# Patient Record
Sex: MALE | Race: WHITE | Employment: FULL TIME | URBAN - METROPOLITAN AREA
[De-identification: names, ages, dates, MRNs, and addresses within clinical notes are randomized per-mention and may not be internally consistent; named-entity substitution may affect disease eponyms.]

---

## 2017-10-27 LAB
CREATININE, EXTERNAL: 1.2
LDL-C, EXTERNAL: 49

## 2018-05-18 ENCOUNTER — APPOINTMENT (OUTPATIENT)
Dept: MRI IMAGING | Age: 63
DRG: 520 | End: 2018-05-18
Attending: EMERGENCY MEDICINE
Payer: COMMERCIAL

## 2018-05-18 ENCOUNTER — APPOINTMENT (OUTPATIENT)
Dept: GENERAL RADIOLOGY | Age: 63
DRG: 520 | End: 2018-05-18
Attending: PHYSICIAN ASSISTANT
Payer: COMMERCIAL

## 2018-05-18 ENCOUNTER — HOSPITAL ENCOUNTER (INPATIENT)
Age: 63
LOS: 1 days | Discharge: HOME | DRG: 520 | End: 2018-05-22
Attending: EMERGENCY MEDICINE | Admitting: ORTHOPAEDIC SURGERY
Payer: COMMERCIAL

## 2018-05-18 DIAGNOSIS — M51.26 LUMBAR DISC HERNIATION: Primary | ICD-10-CM

## 2018-05-18 PROBLEM — M54.9 INTRACTABLE BACK PAIN: Status: ACTIVE | Noted: 2018-05-18

## 2018-05-18 PROBLEM — M54.59 INTRACTABLE LOW BACK PAIN: Status: ACTIVE | Noted: 2018-05-18

## 2018-05-18 LAB
ALBUMIN SERPL-MCNC: 4.2 G/DL (ref 3.5–5)
ALBUMIN/GLOB SERPL: 1.6 {RATIO} (ref 1.1–2.2)
ALP SERPL-CCNC: 46 U/L (ref 45–117)
ALT SERPL-CCNC: 27 U/L (ref 12–78)
ANION GAP SERPL CALC-SCNC: 3 MMOL/L (ref 5–15)
AST SERPL-CCNC: 18 U/L (ref 15–37)
ATRIAL RATE: 67 BPM
BASOPHILS # BLD: 0.1 K/UL (ref 0–0.1)
BASOPHILS NFR BLD: 1 % (ref 0–1)
BILIRUB SERPL-MCNC: 0.7 MG/DL (ref 0.2–1)
BUN SERPL-MCNC: 27 MG/DL (ref 6–20)
BUN/CREAT SERPL: 28 (ref 12–20)
CALCIUM SERPL-MCNC: 8.7 MG/DL (ref 8.5–10.1)
CALCULATED P AXIS, ECG09: 78 DEGREES
CALCULATED R AXIS, ECG10: -6 DEGREES
CALCULATED T AXIS, ECG11: 66 DEGREES
CHLORIDE SERPL-SCNC: 106 MMOL/L (ref 97–108)
CO2 SERPL-SCNC: 30 MMOL/L (ref 21–32)
CREAT SERPL-MCNC: 0.98 MG/DL (ref 0.7–1.3)
DIAGNOSIS, 93000: NORMAL
DIFFERENTIAL METHOD BLD: NORMAL
EOSINOPHIL # BLD: 0 K/UL (ref 0–0.4)
EOSINOPHIL NFR BLD: 1 % (ref 0–7)
ERYTHROCYTE [DISTWIDTH] IN BLOOD BY AUTOMATED COUNT: 12.6 % (ref 11.5–14.5)
GLOBULIN SER CALC-MCNC: 2.7 G/DL (ref 2–4)
GLUCOSE SERPL-MCNC: 104 MG/DL (ref 65–100)
HCT VFR BLD AUTO: 42.7 % (ref 36.6–50.3)
HGB BLD-MCNC: 14.4 G/DL (ref 12.1–17)
IMM GRANULOCYTES # BLD: 0 K/UL (ref 0–0.04)
IMM GRANULOCYTES NFR BLD AUTO: 0 % (ref 0–0.5)
LYMPHOCYTES # BLD: 1.7 K/UL (ref 0.8–3.5)
LYMPHOCYTES NFR BLD: 25 % (ref 12–49)
MCH RBC QN AUTO: 31 PG (ref 26–34)
MCHC RBC AUTO-ENTMCNC: 33.7 G/DL (ref 30–36.5)
MCV RBC AUTO: 91.8 FL (ref 80–99)
MONOCYTES # BLD: 0.7 K/UL (ref 0–1)
MONOCYTES NFR BLD: 10 % (ref 5–13)
NEUTS SEG # BLD: 4.4 K/UL (ref 1.8–8)
NEUTS SEG NFR BLD: 64 % (ref 32–75)
NRBC # BLD: 0 K/UL (ref 0–0.01)
NRBC BLD-RTO: 0 PER 100 WBC
P-R INTERVAL, ECG05: 156 MS
PLATELET # BLD AUTO: 247 K/UL (ref 150–400)
PMV BLD AUTO: 10 FL (ref 8.9–12.9)
POTASSIUM SERPL-SCNC: 3.9 MMOL/L (ref 3.5–5.1)
PROT SERPL-MCNC: 6.9 G/DL (ref 6.4–8.2)
Q-T INTERVAL, ECG07: 430 MS
QRS DURATION, ECG06: 104 MS
QTC CALCULATION (BEZET), ECG08: 454 MS
RBC # BLD AUTO: 4.65 M/UL (ref 4.1–5.7)
SODIUM SERPL-SCNC: 139 MMOL/L (ref 136–145)
TROPONIN I SERPL-MCNC: <0.04 NG/ML
VENTRICULAR RATE, ECG03: 67 BPM
WBC # BLD AUTO: 6.9 K/UL (ref 4.1–11.1)

## 2018-05-18 PROCEDURE — 72148 MRI LUMBAR SPINE W/O DYE: CPT

## 2018-05-18 PROCEDURE — 84484 ASSAY OF TROPONIN QUANT: CPT | Performed by: EMERGENCY MEDICINE

## 2018-05-18 PROCEDURE — 85025 COMPLETE CBC W/AUTO DIFF WBC: CPT | Performed by: EMERGENCY MEDICINE

## 2018-05-18 PROCEDURE — 36415 COLL VENOUS BLD VENIPUNCTURE: CPT | Performed by: EMERGENCY MEDICINE

## 2018-05-18 PROCEDURE — 80053 COMPREHEN METABOLIC PANEL: CPT | Performed by: EMERGENCY MEDICINE

## 2018-05-18 PROCEDURE — 96374 THER/PROPH/DIAG INJ IV PUSH: CPT

## 2018-05-18 PROCEDURE — 96376 TX/PRO/DX INJ SAME DRUG ADON: CPT

## 2018-05-18 PROCEDURE — 72100 X-RAY EXAM L-S SPINE 2/3 VWS: CPT

## 2018-05-18 PROCEDURE — 72110 X-RAY EXAM L-2 SPINE 4/>VWS: CPT

## 2018-05-18 PROCEDURE — 74011250636 HC RX REV CODE- 250/636: Performed by: EMERGENCY MEDICINE

## 2018-05-18 PROCEDURE — 77030032490 HC SLV COMPR SCD KNE COVD -B

## 2018-05-18 PROCEDURE — 99285 EMERGENCY DEPT VISIT HI MDM: CPT

## 2018-05-18 PROCEDURE — 99218 HC RM OBSERVATION: CPT

## 2018-05-18 PROCEDURE — 74011250637 HC RX REV CODE- 250/637: Performed by: PHYSICIAN ASSISTANT

## 2018-05-18 PROCEDURE — 74011250637 HC RX REV CODE- 250/637: Performed by: EMERGENCY MEDICINE

## 2018-05-18 PROCEDURE — 96375 TX/PRO/DX INJ NEW DRUG ADDON: CPT

## 2018-05-18 PROCEDURE — 93005 ELECTROCARDIOGRAM TRACING: CPT

## 2018-05-18 PROCEDURE — 74011250636 HC RX REV CODE- 250/636: Performed by: PHYSICIAN ASSISTANT

## 2018-05-18 RX ORDER — HYDROMORPHONE HYDROCHLORIDE 2 MG/ML
1 INJECTION, SOLUTION INTRAMUSCULAR; INTRAVENOUS; SUBCUTANEOUS
Status: DISCONTINUED | OUTPATIENT
Start: 2018-05-18 | End: 2018-05-22 | Stop reason: HOSPADM

## 2018-05-18 RX ORDER — DOCUSATE SODIUM 100 MG/1
100 CAPSULE, LIQUID FILLED ORAL 2 TIMES DAILY
Status: DISCONTINUED | OUTPATIENT
Start: 2018-05-18 | End: 2018-05-21 | Stop reason: SDUPTHER

## 2018-05-18 RX ORDER — IBUPROFEN 200 MG
400 TABLET ORAL
Status: ON HOLD | COMMUNITY
End: 2018-05-22

## 2018-05-18 RX ORDER — CLOPIDOGREL BISULFATE 75 MG/1
75 TABLET ORAL DAILY
Status: ON HOLD | COMMUNITY
End: 2018-05-22

## 2018-05-18 RX ORDER — MORPHINE SULFATE 4 MG/ML
4 INJECTION INTRAVENOUS
Status: COMPLETED | OUTPATIENT
Start: 2018-05-18 | End: 2018-05-18

## 2018-05-18 RX ORDER — ROSUVASTATIN CALCIUM 40 MG/1
40 TABLET, COATED ORAL DAILY
COMMUNITY

## 2018-05-18 RX ORDER — OXYCODONE HYDROCHLORIDE 5 MG/1
5-10 TABLET ORAL
Status: DISCONTINUED | OUTPATIENT
Start: 2018-05-18 | End: 2018-05-22 | Stop reason: HOSPADM

## 2018-05-18 RX ORDER — SODIUM CHLORIDE 0.9 % (FLUSH) 0.9 %
5-10 SYRINGE (ML) INJECTION EVERY 8 HOURS
Status: DISCONTINUED | OUTPATIENT
Start: 2018-05-18 | End: 2018-05-22 | Stop reason: HOSPADM

## 2018-05-18 RX ORDER — METHYLPREDNISOLONE 4 MG/1
4-20 TABLET ORAL
COMMUNITY
End: 2018-05-22

## 2018-05-18 RX ORDER — LORAZEPAM 2 MG/ML
2 INJECTION INTRAMUSCULAR
Status: DISCONTINUED | OUTPATIENT
Start: 2018-05-18 | End: 2018-05-22 | Stop reason: HOSPADM

## 2018-05-18 RX ORDER — SODIUM CHLORIDE 9 MG/ML
100 INJECTION, SOLUTION INTRAVENOUS CONTINUOUS
Status: DISCONTINUED | OUTPATIENT
Start: 2018-05-18 | End: 2018-05-22 | Stop reason: HOSPADM

## 2018-05-18 RX ORDER — DIAZEPAM 10 MG/2ML
5 INJECTION INTRAMUSCULAR
Status: DISCONTINUED | OUTPATIENT
Start: 2018-05-18 | End: 2018-05-18

## 2018-05-18 RX ORDER — EZETIMIBE 10 MG/1
10 TABLET ORAL DAILY
Status: DISCONTINUED | OUTPATIENT
Start: 2018-05-19 | End: 2018-05-22 | Stop reason: HOSPADM

## 2018-05-18 RX ORDER — ACETAMINOPHEN 325 MG/1
650 TABLET ORAL
Status: DISCONTINUED | OUTPATIENT
Start: 2018-05-18 | End: 2018-05-22 | Stop reason: HOSPADM

## 2018-05-18 RX ORDER — ATORVASTATIN CALCIUM 40 MG/1
80 TABLET, FILM COATED ORAL DAILY
Status: DISCONTINUED | OUTPATIENT
Start: 2018-05-19 | End: 2018-05-22 | Stop reason: HOSPADM

## 2018-05-18 RX ORDER — DIPHENHYDRAMINE HYDROCHLORIDE 50 MG/ML
12.5 INJECTION, SOLUTION INTRAMUSCULAR; INTRAVENOUS
Status: DISCONTINUED | OUTPATIENT
Start: 2018-05-18 | End: 2018-05-22 | Stop reason: HOSPADM

## 2018-05-18 RX ORDER — BISMUTH SUBSALICYLATE 262 MG
1 TABLET,CHEWABLE ORAL DAILY
COMMUNITY

## 2018-05-18 RX ORDER — SODIUM CHLORIDE 0.9 % (FLUSH) 0.9 %
5-10 SYRINGE (ML) INJECTION AS NEEDED
Status: DISCONTINUED | OUTPATIENT
Start: 2018-05-18 | End: 2018-05-22 | Stop reason: HOSPADM

## 2018-05-18 RX ORDER — EZETIMIBE 10 MG/1
10 TABLET ORAL DAILY
COMMUNITY
End: 2021-09-01 | Stop reason: ALTCHOICE

## 2018-05-18 RX ORDER — MORPHINE SULFATE 10 MG/ML
6 INJECTION, SOLUTION INTRAMUSCULAR; INTRAVENOUS
Status: COMPLETED | OUTPATIENT
Start: 2018-05-18 | End: 2018-05-18

## 2018-05-18 RX ORDER — ONDANSETRON 2 MG/ML
4 INJECTION INTRAMUSCULAR; INTRAVENOUS
Status: DISCONTINUED | OUTPATIENT
Start: 2018-05-18 | End: 2018-05-22 | Stop reason: HOSPADM

## 2018-05-18 RX ORDER — DEXAMETHASONE SODIUM PHOSPHATE 4 MG/ML
10 INJECTION, SOLUTION INTRA-ARTICULAR; INTRALESIONAL; INTRAMUSCULAR; INTRAVENOUS; SOFT TISSUE
Status: COMPLETED | OUTPATIENT
Start: 2018-05-18 | End: 2018-05-18

## 2018-05-18 RX ORDER — GUAIFENESIN 100 MG/5ML
81 LIQUID (ML) ORAL DAILY
Status: ON HOLD | COMMUNITY
End: 2018-05-22

## 2018-05-18 RX ORDER — HYDROMORPHONE HYDROCHLORIDE 2 MG/1
1 TABLET ORAL
Status: COMPLETED | OUTPATIENT
Start: 2018-05-18 | End: 2018-05-18

## 2018-05-18 RX ORDER — ONDANSETRON 2 MG/ML
4 INJECTION INTRAMUSCULAR; INTRAVENOUS
Status: COMPLETED | OUTPATIENT
Start: 2018-05-18 | End: 2018-05-18

## 2018-05-18 RX ORDER — NALOXONE HYDROCHLORIDE 0.4 MG/ML
0.4 INJECTION, SOLUTION INTRAMUSCULAR; INTRAVENOUS; SUBCUTANEOUS AS NEEDED
Status: DISCONTINUED | OUTPATIENT
Start: 2018-05-18 | End: 2018-05-21 | Stop reason: SDUPTHER

## 2018-05-18 RX ADMIN — Medication 10 ML: at 16:13

## 2018-05-18 RX ADMIN — Medication 10 ML: at 22:00

## 2018-05-18 RX ADMIN — LORAZEPAM 1 MG: 2 INJECTION INTRAMUSCULAR; INTRAVENOUS at 17:10

## 2018-05-18 RX ADMIN — SODIUM CHLORIDE 100 ML/HR: 900 INJECTION, SOLUTION INTRAVENOUS at 16:10

## 2018-05-18 RX ADMIN — MORPHINE SULFATE 4 MG: 4 INJECTION INTRAVENOUS at 15:47

## 2018-05-18 RX ADMIN — ONDANSETRON 4 MG: 2 INJECTION INTRAMUSCULAR; INTRAVENOUS at 10:08

## 2018-05-18 RX ADMIN — MORPHINE SULFATE 6 MG: 10 INJECTION INTRAVENOUS at 10:08

## 2018-05-18 RX ADMIN — DOCUSATE SODIUM 100 MG: 100 CAPSULE, LIQUID FILLED ORAL at 19:28

## 2018-05-18 RX ADMIN — HYDROMORPHONE HYDROCHLORIDE 1 MG: 2 TABLET ORAL at 13:51

## 2018-05-18 RX ADMIN — DEXAMETHASONE SODIUM PHOSPHATE 10 MG: 4 INJECTION, SOLUTION INTRAMUSCULAR; INTRAVENOUS at 13:51

## 2018-05-18 RX ADMIN — HYDROMORPHONE HYDROCHLORIDE 1 MG: 2 INJECTION INTRAMUSCULAR; INTRAVENOUS; SUBCUTANEOUS at 20:30

## 2018-05-18 NOTE — ED NOTES
Care assumed after report from Carlos London, [facility]. Pt resting quietly with wife at bedside. Pt awaiting MRI. No c/o pain at this time, stating morphine was effective. Will continue to monitor.

## 2018-05-18 NOTE — PROGRESS NOTES
Problem: Falls - Risk of  Goal: *Absence of Falls  Document Latrell Fall Risk and appropriate interventions in the flowsheet.    Outcome: Progressing Towards Goal  Fall Risk Interventions:  Mobility Interventions: Patient to call before getting OOB, Utilize walker, cane, or other assitive device         Medication Interventions: Patient to call before getting OOB, Teach patient to arise slowly    Elimination Interventions: Call light in reach

## 2018-05-18 NOTE — ED NOTES
TRANSFER - OUT REPORT:    Verbal report given to RONA Nettles for [employer]) on [facility]  being transferred to [facility](unit) for routine progression of care       Report consisted of patients Situation, Background, Assessment and   Recommendations(SBAR). Information from the following report(s) SBAR was reviewed with the receiving nurse. Lines:   Peripheral IV 05/18/18 Left Antecubital (Active)   Site Assessment Clean, dry, & intact 5/18/2018  9:47 AM   Phlebitis Assessment 0 5/18/2018  9:47 AM   Infiltration Assessment 0 5/18/2018  9:47 AM   Dressing Status Clean, dry, & intact 5/18/2018  9:47 AM   Hub Color/Line Status Pink;Capped;Flushed 5/18/2018  9:47 AM        Opportunity for questions and clarification was provided.       Patient transported with:   Miproto

## 2018-05-18 NOTE — IP AVS SNAPSHOT
[address] [facility] 
[phone] Patient: Geoff Cordova MRN: [id number] LVI:[id number] About your hospitalization You were admitted on:  May 18, 2018 You last received care in the:  [facility] ORTHOPEDICS You were discharged on:  May 22, 2018 Why you were hospitalized Your primary diagnosis was:  Lumbar Disc Herniation Your diagnoses also included:  Intractable Back Pain, Intractable Low Back Pain, Herniated Nucleus Pulposus Follow-up Information Follow up With Details Comments Contact Info Renetta Li MD Schedule an appointment as soon as possible for a visit in 2 weeks  [address] 
[phone] Derek Mena MD   [facility] 
[phone] Discharge Orders None A check ollie indicates which time of day the medication should be taken. My Medications START taking these medications Instructions Each Dose to Equal  
 Morning Noon Evening Bedtime  
 acetaminophen 325 mg tablet Commonly known as:  TYLENOL Your last dose was: Your next dose is: Take 2 Tabs by mouth every six (6) hours for 14 days. 650 mg  
    
   
   
   
  
 oxyCODONE IR 5 mg immediate release tablet Commonly known as:  Tom Kays Your last dose was: Your next dose is: Take 1-2 Tabs by mouth every four (4) hours as needed. Max Daily Amount: 60 mg.  
 5-10 mg  
    
   
   
   
  
 polyethylene glycol 17 gram/dose powder Commonly known as:  Clay Goodwin Your last dose was: Your next dose is: Take 17 g by mouth daily for 15 days. 17 g  
    
   
   
   
  
 senna-docusate 8.6-50 mg per tablet Commonly known as:  SENNA PLUS Your last dose was: Your next dose is: Take 1 Tab by mouth two (2) times a day. 1 Tab CHANGE how you take these medications Instructions Each Dose to Equal  
 Morning Noon Evening Bedtime  
 aspirin 81 mg chewable tablet What changed:  additional instructions Your last dose was: Your next dose is: Take 1 Tab by mouth daily. May resume in two days. 81 mg FISH -160-1,000 mg Cap Generic drug:  omega 3-dha-epa-fish oil What changed:  additional instructions Your last dose was: Your next dose is: Take 1,000 mg by mouth daily. Hold for two weeks. 1000 mg  
    
   
   
   
  
 ibuprofen 200 mg tablet Commonly known as:  MOTRIN What changed:  additional instructions Your last dose was: Your next dose is: Take 2 Tabs by mouth every eight (8) hours as needed for Pain. Hold for two weeks. 400 mg PLAVIX 75 mg Tab Generic drug:  clopidogrel What changed:  additional instructions Your last dose was: Your next dose is: Take 1 Tab by mouth daily. Dr. Eduar Chang asks that you resume in 4-5 days but noted you may use your professional judgement. 75 mg CONTINUE taking these medications Instructions Each Dose to Equal  
 Morning Noon Evening Bedtime CRESTOR 40 mg tablet Generic drug:  rosuvastatin Your last dose was: Your next dose is: Take 40 mg by mouth daily. 40 mg  
    
   
   
   
  
 multivitamin tablet Commonly known as:  ONE A DAY Your last dose was: Your next dose is: Take 1 Tab by mouth daily. 1 Tab  
    
   
   
   
  
 REPATHA SYRINGE syringe Generic drug:  evolocumab Your last dose was: Your next dose is:    
   
   
 140 mg by SubCUTAneous route every fourteen (14) days. 140 mg  
    
   
   
   
  
 ZETIA 10 mg tablet Generic drug:  ezetimibe Your last dose was: Your next dose is: Take 10 mg by mouth daily. 10 mg  
    
   
   
   
  
  
STOP taking these medications FISH  mg Cap Generic drug:  Omega-3 Fatty Acids  
   
  
 methylPREDNISolone 4 mg Tab Commonly known as:  MEDROL Where to Get Your Medications Information on where to get these meds will be given to you by the nurse or doctor. ! Ask your nurse or doctor about these medications  
  acetaminophen 325 mg tablet  
 oxyCODONE IR 5 mg immediate release tablet  
 polyethylene glycol 17 gram/dose powder  
 senna-docusate 8.6-50 mg per tablet Opioid Education Prescription Opioids: What You Need to Know: 
 
Prescription opioids can be used to help relieve moderate-to-severe pain and are often prescribed following a surgery or injury, or for certain health conditions. These medications can be an important part of treatment but also come with serious risks. Opioids are strong pain medicines. Examples include hydrocodone, oxycodone, fentanyl, and morphine. Heroin is an example of an illegal opioid. It is important to work with your health care provider to make sure you are getting the safest, most effective care. WHAT ARE THE RISKS AND SIDE EFFECTS OF OPIOID USE? Prescription opioids carry serious risks of addiction and overdose, especially with prolonged use. An opioid overdose, often marked by slow breathing, can cause sudden death. The use of prescription opioids can have a number of side effects as well, even when taken as directed. · Tolerance-meaning you might need to take more of a medication for the same pain relief · Physical dependence-meaning you have symptoms of withdrawal when the medication is stopped. Withdrawal symptoms can include nausea, sweating, chills, diarrhea, stomach cramps, and muscle aches. Withdrawal can last up to several weeks, depending on which drug you took and how long you took it. · Increased sensitivity to pain · Constipation · Nausea, vomiting, and dry mouth · Sleepiness and dizziness · Confusion · Depression · Low levels of testosterone that can result in lower sex drive, energy, and strength · Itching and sweating RISKS ARE GREATER WITH:      
· History of drug misuse, substance use disorder, or overdose · Mental health conditions (such as depression or anxiety) · Sleep apnea · Older age (72 years or older) · Pregnancy Avoid alcohol while taking prescription opioids. Also, unless specifically advised by your health care provider, medications to avoid include: · Benzodiazepines (such as Xanax or Valium) · Muscle relaxants (such as Soma or Flexeril) · Hypnotics (such as Ambien or Lunesta) · Other prescription opioids KNOW YOUR OPTIONS Talk to your health care provider about ways to manage your pain that don't involve prescription opioids. Some of these options may actually work better and have fewer risks and side effects. Options may include: 
· Pain relievers such as acetaminophen, ibuprofen, and naproxen · Some medications that are also used for depression or seizures · Physical therapy and exercise · Counseling to help patients learn how to cope better with triggers of pain and stress. · Application of heat or cold compress · Massage therapy · Relaxation techniques Be Informed Make sure you know the name of your medication, how much and how often to take it, and its potential risks & side effects. IF YOU ARE PRESCRIBED OPIOIDS FOR PAIN: 
· Never take opioids in greater amounts or more often than prescribed. Remember the goal is not to be pain-free but to manage your pain at a tolerable level. · Follow up with your primary care provider to: · Work together to create a plan on how to manage your pain. · Talk about ways to help manage your pain that don't involve prescription opioids. · Talk about any and all concerns and side effects. · Help prevent misuse and abuse. · Never sell or share prescription opioids · Help prevent misuse and abuse. · Store prescription opioids in a secure place and out of reach of others (this may include visitors, children, friends, and family). · Safely dispose of unused/unwanted prescription opioids: Find your community drug take-back program or your pharmacy mail-back program, or flush them down the toilet, following guidance from the Food and Drug Administration (www.fda.gov/Drugs/ResourcesForYou). · Visit www.cdc.gov/drugoverdose to learn about the risks of opioid abuse and overdose. · If you believe you may be struggling with addiction, tell your health care provider and ask for guidance or call [address] [place] [address] at 3-060-702-OJQF. Discharge Instructions SPINE DISCHARGE  INSTRUCTIONS Brad Denis M.D. What can I do? ? The only exercise you should do is walking. Start by walking twice a day for 5 minutes, then increase by  2-3 minutes every day until you reach 25 minutes twice a day. DO NOT sit for long periods of time (20 minutes at a time for the first couple of weeks, then gradually increase. ? No heavy lifting (5 lbs max); no straining, twisting, or bending. ? No driving until your physician tells you it is ok. It is, however, ok to ride short distances in a car. 
? If you are required to wear a back brace, you may remove it when you are sleeping unless your doctor has advised against it. ? If you are required to wear a cervical collar, you must sleep in it. You can remove it only for showers. What can I eat?  
? You may resume your regular home diet as tolerated. If your throat is sore, you may want to eat soft food for the first few days. When can I take a shower? ?  You may shower leaving on your occlusive (waterproof) dressing on allowing water to run over the dressing. The dressing may be removed in 5 days. The small pieces of tape (steri strips)  underneath it should stay on. Let water run over them, then pat dry gently. ? Do not take baths or soak in pools. ? You may remove your brace for showering. Medications: 
? Check with your physician before taking any anti-inflammatory medicines (Advil, Aleve, ibuprofen, aspirin). ? Take prescription medicine as directed. DO NOT take more than prescribed. Call your physician if the prescribed dose is not sufficiently controlling your pain. ? It is important to have regular bowel movements. Pain medications may cause constipation. Drink plenty of fluids, get enough fiber in your diet, and use stool softeners and drink prune juice to help prevent constipation. Do not take laxatives if at all possible except in severe situations. It can result in a vicious cycle of constipation and diarrhea. ? Do not put any ointments or creams on your incision unless directed to do so by your physician. ? Tobacco products should be avoided during the postoperative phase. When do I see the physician again? ? Please call your physicians office as soon as you get home to schedule your 1st post operative appointment. You should be seen approximately two weeks after your surgery. At this appointment you will see your doctors Assistant for a wound check and to answer any questions you may have. 
? You will see your physician approximately six weeks after your surgery. ? If you had a fusion, you will need to get an order for xrays to be taken prior to the six week appointment. These should be done on the day of the appointment or 1-2 days before. ? If you need the number to your doctors office, please request it from your nurse or see below. NOTIFY YOUR PHYSICIAN OF ANY OF THE FOLLOWING: 
 
? Fever above 101º for 24 hrs. 
? Nausea or vomiting. 
? Inability to urinate ? Loss of bowel or bladder function (sudden onset of incontinence) ? Changes in sensation (numbness, tingling, color change) in your extremities ? Pain not relieved by your medicine. ? Redness, swelling or drainage from your incision ? Persistent pain in the calf of either leg ? Development of severe pain FOR APPOINTMENTS OR QUESTIONS CALL: 
 
Neurosurgical JACLYN Cook [address] [facility], [address] [place] [address] 
[phone] [facility] Announcement We are excited to announce that we are making your provider's discharge notes available to you in [facility]. You will see these notes when they are completed and signed by the physician that discharged you from your recent hospital stay. If you have any questions or concerns about any information you see in [facility], please call the Health Information Department where you were seen or reach out to your Primary Care Provider for more information about your plan of care. Introducing [facility]! [facility] introduces [facility] patient portal. Now you can access parts of your medical record, email your doctor's office, and request medication refills online. 1. In your internet browser, go to [email or web address] 2. Click on the First Time User? Click Here link in the Sign In box. You will see the New Member Sign Up page. 3. Enter your [facility] Access Code exactly as it appears below. You will not need to use this code after youve completed the sign-up process. If you do not sign up before the expiration date, you must request a new code. · [facility] Access Code: [email or web address] Expires: 8/16/2018  9:18 AM 
 
4. Enter the last four digits of your Social Security Number (xxxx) and Date of Birth (mm/dd/yyyy) as indicated and click Submit. You will be taken to the next sign-up page. 5. Create a [facility] ID.  This will be your [facility] login ID and cannot be changed, so think of one that is secure and easy to remember. 6. Create a [employer] password. You can change your password at any time. 7. Enter your Password Reset Question and Answer. This can be used at a later time if you forget your password. 8. Enter your e-mail address. You will receive e-mail notification when new information is available in [address] 9. Click Sign Up. You can now view and download portions of your medical record. 10. Click the Download Summary menu link to download a portable copy of your medical information. If you have questions, please visit the Frequently Asked Questions section of the [employer] website. Remember, [employer] is NOT to be used for urgent needs. For medical emergencies, dial 911. Now available from your iPhone and Android! Introducing Carlos Eduardo Fulton As a Raul Fleming patient, I wanted to make you aware of our electronic visit tool called Carlos Eduardo Donaldo. Raul Fleming [employer] allows you to connect within minutes with a medical provider 24 hours a day, seven days a week via a mobile device or tablet or logging into a secure website from your computer. You can access Carlos Eduardo Fulton from anywhere in the [place]. A virtual visit might be right for you when you have a simple condition and feel like you just dont want to get out of bed, or cant get away from work for an appointment, when your [facility] is not available (evenings, weekends or holidays), or when youre out of town and need minor care. Electronic visits cost only $49 and if the [facility] provider determines a prescription is needed to treat your condition, one can be electronically transmitted to a nearby pharmacy*. Please take a moment to enroll today if you have not already done so. The enrollment process is free and takes just a few minutes.   To enroll, please download the [employer] blake to your tablet or phone, or visit [email or web address] to enroll on your computer. And, as an [address] [place] [address] patient with a [employer] account, the results of your visits will be scanned into your electronic medical record and your primary care provider will be able to view the scanned results. We urge you to continue to see your regular Radha Reusing provider for your ongoing medical care. And while your primary care provider may not be the one available when you seek a [employer] virtual visit, the peace of mind you get from getting a real diagnosis real time can be priceless. For more information on [employer], view our Frequently Asked Questions (FAQs) at [email or web address]. Sincerely, 
 
Kanika Kauffman MD 
Chief Medical Officer [facility] Kathi Ronquillo *:  certain medications cannot be prescribed via [employer] Providers Seen During Your Hospitalization Provider Specialty Primary office phone Emeterio Garza MD Emergency Medicine [phone] Zackary Snellen, MD Orthopedic Surgery [phone] Padmini Parks MD Neurosurgery [phone] Your Primary Care Physician (PCP) Primary Care Physician Office Phone Office Fax Shara Mcardle [phone] You are allergic to the following No active allergies Recent Documentation Height Weight BMI Smoking Status 1.702 m 65.8 kg 22.71 kg/m2 Never Smoker Emergency Contacts Name Discharge Info Relation Home Work Mobile TrainerIvone DISCHARGE CAREGIVER [3] Spouse [3] [phone] Patient Belongings The following personal items are in your possession at time of discharge: 
  Dental Appliances: None  Visual Aid: Glasses   Hearing Aids/Status: Does not own  Home Medications: None   Jewelry: None  Clothing: None    Other Valuables: None  Personal Items Sent to Safe: No valuables brought to [facility] Please provide this summary of care documentation to your next provider. Signatures-by signing, you are acknowledging that this After Visit Summary has been reviewed with you and you have received a copy. Patient Signature:  ____________________________________________________________ Date:  ____________________________________________________________  
  
Rexann Ports Provider Signature:  ____________________________________________________________ Date:  ____________________________________________________________

## 2018-05-18 NOTE — ED PROVIDER NOTES
EMERGENCY DEPARTMENT HISTORY AND PHYSICAL EXAM      Date: 5/18/2018  Patient Name: Radha Ball    History of Presenting Illness     Chief Complaint   Patient presents with    Back Pain     pt was working out on Wednesday and hurt his back, pt seen by primary and placed on steroids       History Provided By: Patient    HPI: Radha Ball, 59 y.o. male with PMHx significant for CAD, HLD, presents via EMS to the ED with cc of constant, progressively worsening left lower back pain since onset 3 days ago. Pt reports that the pain radiates down the left lower extremity. Pt explains that his mild back pain began after a workout two days ago that has gotten progressively worse. Pt endorses an inability to stand or walk this morning secondary to exacerbation of pain, explaining that he felt weak. He reports feeling lightheaded and as if he was about to pass out but states he was able to sit down without any LOC. He states that he followed up with his PCP yesterday regarding the pain and had an unremarkable lumbar x-ray other than L4-L5 arthritis. He was started on a Medrol dose pack which he has been taking in addition to Advil 800 mg without relief. Additionally, he reports taking 1 tablet of Hydrocodone today. He reports a hx of CAD with prior stent placement and is currently on Plavix. Pt denies tingling numbness, saddle anesthesia, urinary/fecal incontinence,  CP, SOB, cough, abd pain, or N/V/D. There are no other complaints, changes, or physical findings at this time. PCP: Lucy Wilson MD      Past History     Past Medical History:  Past Medical History:   Diagnosis Date    CAD (coronary artery disease)     Hyperlipidemia        Past Surgical History:  Past Surgical History:   Procedure Laterality Date    HX ANGIOPLASTY      HX APPENDECTOMY      HX HERNIA REPAIR      HX TONSILLECTOMY      HX WISDOM TEETH EXTRACTION         Family History:  History reviewed.  No pertinent family history. Social History:  Social History   Substance Use Topics    Smoking status: Never Smoker    Smokeless tobacco: Never Used    Alcohol use Yes      Comment: social       Allergies:  No Known Allergies      Review of Systems   Review of Systems   Constitutional: Negative for chills, fatigue and fever. HENT: Negative for congestion, rhinorrhea and sore throat. Eyes: Negative for pain, discharge and visual disturbance. Respiratory: Negative for cough, chest tightness, shortness of breath and wheezing. Cardiovascular: Negative for chest pain, palpitations and leg swelling. Gastrointestinal: Negative for abdominal pain, constipation, diarrhea, nausea and vomiting. Genitourinary: Negative for dysuria, frequency and hematuria. Musculoskeletal: Positive for back pain. Negative for arthralgias and myalgias. Skin: Negative for rash. Neurological: Positive for weakness (generalized) and light-headedness. Negative for dizziness and headaches. Negative for urinary/fecal incontinence, lower extremity weakness, numbness/tingling, saddle anesthesia. Psychiatric/Behavioral: Negative. Physical Exam   Physical Exam   Constitutional: He is oriented to person, place, and time. He appears well-developed and well-nourished. Distressed: Appears uncomfortable. HENT:   Head: Normocephalic and atraumatic. Eyes: EOM are normal. Right eye exhibits no discharge. Left eye exhibits no discharge. No scleral icterus. Neck: Normal range of motion. Neck supple. No tracheal deviation present. Cardiovascular: Normal rate, regular rhythm, normal heart sounds and intact distal pulses. Exam reveals no gallop and no friction rub. No murmur heard. Pulmonary/Chest: Effort normal and breath sounds normal. No respiratory distress. He has no wheezes. He has no rales. Abdominal: Soft. He exhibits no distension. There is no tenderness. Musculoskeletal: Normal range of motion. He exhibits no edema. There is no midline T or L spine tenderness. Dorsiflexion intact in great toes bilaterally. Strength 5/5 in all extremities   Lymphadenopathy:     He has no cervical adenopathy. Neurological: He is alert and oriented to person, place, and time. 5/5 strength in all extremities. Skin: Skin is warm and dry. No rash noted. Psychiatric: He has a normal mood and affect. Nursing note and vitals reviewed. Diagnostic Study Results     Labs -  Recent Results (from the past 12 hour(s))   EKG, 12 LEAD, INITIAL    Collection Time: 05/18/18  9:34 AM   Result Value Ref Range    Ventricular Rate 67 BPM    Atrial Rate 67 BPM    P-R Interval 156 ms    QRS Duration 104 ms    Q-T Interval 430 ms    QTC Calculation (Bezet) 454 ms    Calculated P Axis 78 degrees    Calculated R Axis -6 degrees    Calculated T Axis 66 degrees    Diagnosis       Normal sinus rhythm  Possible Left atrial enlargement  RSR' or QR pattern in V1 suggests right ventricular conduction delay  Non-specific ST changes anteriorly  No previous ECGs available  Confirmed by Christ Us ([id number]) on 5/18/2018 11:57:49 AM     CBC WITH AUTOMATED DIFF    Collection Time: 05/18/18  9:51 AM   Result Value Ref Range    WBC 6.9 4.1 - 11.1 K/uL    RBC 4.65 4.10 - 5.70 M/uL    HGB 14.4 12.1 - 17.0 g/dL    HCT 42.7 36.6 - 50.3 %    MCV 91.8 80.0 - 99.0 FL    MCH 31.0 26.0 - 34.0 PG    MCHC 33.7 30.0 - 36.5 g/dL    RDW 12.6 11.5 - 14.5 %    PLATELET 524 448 - 362 K/uL    MPV 10.0 8.9 - 12.9 FL    NRBC 0.0 0  WBC    ABSOLUTE NRBC 0.00 0.00 - 0.01 K/uL    NEUTROPHILS 64 32 - 75 %    LYMPHOCYTES 25 12 - 49 %    MONOCYTES 10 5 - 13 %    EOSINOPHILS 1 0 - 7 %    BASOPHILS 1 0 - 1 %    IMMATURE GRANULOCYTES 0 0.0 - 0.5 %    ABS. NEUTROPHILS 4.4 1.8 - 8.0 K/UL    ABS. LYMPHOCYTES 1.7 0.8 - 3.5 K/UL    ABS. MONOCYTES 0.7 0.0 - 1.0 K/UL    ABS. EOSINOPHILS 0.0 0.0 - 0.4 K/UL    ABS. BASOPHILS 0.1 0.0 - 0.1 K/UL    ABS. IMM.  GRANS. 0.0 0.00 - 0.04 K/UL    DF AUTOMATED     METABOLIC PANEL, COMPREHENSIVE    Collection Time: 05/18/18  9:51 AM   Result Value Ref Range    Sodium 139 136 - 145 mmol/L    Potassium 3.9 3.5 - 5.1 mmol/L    Chloride 106 97 - 108 mmol/L    CO2 30 21 - 32 mmol/L    Anion gap 3 (L) 5 - 15 mmol/L    Glucose 104 (H) 65 - 100 mg/dL    BUN 27 (H) 6 - 20 MG/DL    Creatinine 0.98 0.70 - 1.30 MG/DL    BUN/Creatinine ratio 28 (H) 12 - 20      GFR est AA >60 >60 ml/min/1.73m2    GFR est non-AA >60 >60 ml/min/1.73m2    Calcium 8.7 8.5 - 10.1 MG/DL    Bilirubin, total 0.7 0.2 - 1.0 MG/DL    ALT (SGPT) 27 12 - 78 U/L    AST (SGOT) 18 15 - 37 U/L    Alk. phosphatase 46 45 - 117 U/L    Protein, total 6.9 6.4 - 8.2 g/dL    Albumin 4.2 3.5 - 5.0 g/dL    Globulin 2.7 2.0 - 4.0 g/dL    A-G Ratio 1.6 1.1 - 2.2     TROPONIN I    Collection Time: 05/18/18  9:51 AM   Result Value Ref Range    Troponin-I, Qt. <0.04 <0.05 ng/mL       Radiologic Studies -   MRI LUMB SPINE WO CONT   Final Result   EXAM:  MRI LUMB SPINE WO CONT     INDICATION:  Low back pain since injury 2 days ago. Now unable to urinate.     COMPARISON: None     TECHNIQUE: MR imaging of the lumbar spine was performed using the following  sequences: sagittal T1, T2, STIR;  axial T1, T2.      CONTRAST:  None.     FINDINGS:     5 mm grade 1 anterolisthesis of L4-5 is secondary to facet arthrosis. No  evidence of spondylolysis. Moderate facet arthrosis L4-S1. Vertebral body  heights are maintained. Degenerative bone marrow edema is in the right L4-5  facets. No marrow replacing process. Mild disc desiccation and vacuum disc  phenomenon at L4-5. No discitis.     The conus medullaris terminates at L1. Signal and caliber of the distal spinal  cord are within normal limits.      The paraspinal soft tissues are within normal limits.     Lower thoracic spine: No herniation or stenosis.     L1-L2:  No herniation or stenosis.     L2-L3:  Minimal diffuse disc bulge. No stenosis.     L3-L4:  Diffuse disc bulge.  No stenosis.     L4-L5:  Uncovered diffuse disc bulge. Superimposed left central disc extrusion  migrates routine millimeters cranially. Facet arthrosis. Mild central spinal  canal stenosis. Moderate left foraminal stenosis and likely mass effect on the  left L4 nerve. Mild right foraminal stenosis.     L5-S1:  Minimal diffuse disc bulge. Facet arthrosis. No stenosis.     IMPRESSION  IMPRESSION:     1. L4-5 left central disc extrusion likely affects the left L4 nerve. 2. L4-5 grade 1 anterolisthesis secondary to facet arthrosis. 3. No fracture. Medical Decision Making   I am the first provider for this patient. I reviewed the vital signs, available nursing notes, past medical history, past surgical history, family history and social history. Vital Signs-Reviewed the patient's vital signs. Patient Vitals for the past 12 hrs:   Temp Pulse Resp BP SpO2   05/18/18 1256 - - - 128/88 -   05/18/18 1030 - - - 120/82 97 %   05/18/18 1000 - - - 126/81 98 %   05/18/18 0930 - - - 136/88 98 %   05/18/18 0929 97.3 °F (36.3 °C) 72 14 (!) 125/108 97 %       EKG interpretation: (Preliminary) 0934  Rhythm: normal sinus rhythm; and regular . Rate (approx.): 67; Axis: normal; NV interval: normal; QRS interval: normal ; ST/T wave: Nonspecific ST/T wave abnormality. Written by EFRA Dumont, as dictated by Stacey Turner MD.    Records Reviewed: Nursing Notes    Provider Notes (Medical Decision Making):   DDx: disc herniation, DDD, cord compression, sciatica, sprain, strain, fracture, contusion    ED Course:   Initial assessment performed. The patients presenting problems have been discussed, and they are in agreement with the care plan formulated and outlined with them. I have encouraged them to ask questions as they arise throughout their visit.     CONSULT NOTE:  1:08 PM  Stacey Turner MD spoke with Gab Alvarado PA-C,  Specialty: Orthopedic Surgery  Discussed pt's hx, disposition, and available diagnostic and imaging results. Reviewed care plans. Consultant will review pt's imaging and case with his attending. PROGRESS NOTE:  1:16 PM  Gab Alvardao PA-C has spoken with Dr. Savannah Dasilva who recommends Decadron, Dilaudid, and Neurontin. PROGRESS NOTE:  1:54 PM  Pt to be admitted to orthopedics under Dr. Savannah Dasilva. Disposition:  2:03 PM  Patient is being admitted to the hospital.  The results of their tests and reasons for their admission have been discussed with them and/or available family. They convey agreement and understanding for the need to be admitted and for their admission diagnosis. Consultation has been made with the inpatient physician specialist for hospitalization. PLAN:  1. Admit to Orthopedics    Diagnosis     Clinical Impression:   1. Lumbar disc herniation        Attestations: This note is prepared by Maxine English, acting as Scribe for Stacey Turner MD.    Stacey Turner MD: The scribe's documentation has been prepared under my direction and personally reviewed by me in its entirety. I confirm that the note above accurately reflects all work, treatment, procedures, and medical decision making performed by me.

## 2018-05-18 NOTE — H&P
Subjective:     Patient ID: Rupa Pittman is a 59 y.o. male. Chief Complaint: Back Pain (pt was working out on Wednesday and hurt his back, pt seen by primary and placed on steroids)      HPI:  Rupa Pittman is a 59 y.o. male with complaints of LBP and LLE. The pain is sharp, achy in quality. It has been present for 2 days and is there constantly. It is rated 10 out of 10 on the VAS. It is worse with standing/walking and better with laying down. Patient Active Problem List    Diagnosis Date Noted    Lumbar disc herniation 05/18/2018    Intractable back pain 05/18/2018    Intractable low back pain 05/18/2018       History reviewed. No pertinent family history.      Social History   Substance Use Topics    Smoking status: Never Smoker    Smokeless tobacco: Never Used    Alcohol use Yes      Comment: social       Past Medical History:   Diagnosis Date    CAD (coronary artery disease)     Hyperlipidemia         Past Surgical History:   Procedure Laterality Date    HX ANGIOPLASTY      HX APPENDECTOMY      HX HERNIA REPAIR      HX TONSILLECTOMY      HX WISDOM TEETH EXTRACTION            Current Facility-Administered Medications:     sodium chloride (NS) flush 5-10 mL, 5-10 mL, IntraVENous, Q8H, Samina Torres PA-C, 10 mL at 05/18/18 1613    sodium chloride (NS) flush 5-10 mL, 5-10 mL, IntraVENous, PRN, Samina Torres PA-C    0.9% sodium chloride infusion, 100 mL/hr, IntraVENous, CONTINUOUS, Samina Torres PA-C, Last Rate: 100 mL/hr at 05/18/18 1610, 100 mL/hr at 05/18/18 1610    acetaminophen (TYLENOL) tablet 650 mg, 650 mg, Oral, Q4H PRN, Samina Torres PA-C    HYDROmorphone (DILAUDID) injection 1 mg, 1 mg, IntraVENous, Q2H PRN, Samina Torres PA-C    oxyCODONE IR (ROXICODONE) tablet 5-10 mg, 5-10 mg, Oral, Q4H PRN, Samina Torres PA-C    naloxone [facility]) injection 0.4 mg, 0.4 mg, IntraVENous, PRN, Samina Torres PA-C    diphenhydrAMINE (BENADRYL) injection 12.5 mg, 12.5 mg, IntraVENous, Q4H PRN, Uche Rasp, PA-C    [facility]ron [facility] injection 4 mg, 4 mg, IntraVENous, Q4H PRN, Uche Rasp, PA-C    docusate sodium (COLACE) capsule 100 mg, 100 mg, Oral, BID, Uche Rasp, PA-C    [START ON 5/19/2018] ezetimibe (ZETIA) tablet 10 mg, 10 mg, Oral, DAILY, Uche Rasp, PA-C    [START ON 5/19/2018] atorvastatin (LIPITOR) tablet 80 mg, 80 mg, Oral, DAILY, Uche Rasp, PA-C    [START ON 5/19/2018] fish oil-omega-3 fatty acids 340-1,000 mg capsule 1 Cap, 1 Cap, Oral, DAILY, Uche Rasp, PA-C    LORazepam (ATIVAN) injection 2 mg, 2 mg, IntraVENous, Q4H PRN, Uche Rasp, PA-C, 1 mg at 05/18/18 1710    No Known Allergies     ROS:   No new bowel or bladder incontinence. No fever. No saddle anesthesia. Objective:     Visit Vitals    BP (!) 145/93 (BP 1 Location: Right arm, BP Patient Position: At rest)    Pulse 66    Temp 97.9 °F (36.6 °C)    Resp 18    Ht 5' 7\" (1.702 m)    Wt 65.8 kg (145 lb)    SpO2 97%    BMI 22.71 kg/m2       Body mass index is 22.71 kg/(m^2). , a BMI over 30 is considered obese and a BMI over 40 has been associated with a higher risk of surgical complications. Constitutional: No acute distress. Well nourished. HEENT: Normocephalic. Respiratory:  No labored breathing. Cardiovascular:  No marked cyanosis. Skin:  No marked skin ulcers/lesions on bilateral upper or lower extremities. Psychiatric: Alert and oriented x3. Inspection: No gross deformity of bilateral upper or lower extremities. Musculoskeletal/Neurological:   BLE 5/5/5/5/5  Decreased sensation LLE  +2 DTRs  + L SLR      Radiographs:       Xr Spine Lumb Min 4 V    Result Date: 5/18/2018  History: 3 views standing AP, flexion, extension, pain since Wednesday. Frontal, lateral flexion, lateral extension, and lateral views of the lumbosacral spine demonstrate mild anterolisthesis of L4 on L5 with disc space narrowing.  On neutral positioning, this measures 3.9 mm. With flexion, this measures 5.0 mm and with extension 2.6 mm. There are facet arthropathy. IMPRESSION: Degenerative disc disease L4-L5 with mild instability. Mri Lumb Spine Wo Cont    Result Date: 5/18/2018  EXAM:  MRI LUMB SPINE WO CONT INDICATION:  Low back pain since injury 2 days ago. Now unable to urinate. COMPARISON: None TECHNIQUE: MR imaging of the lumbar spine was performed using the following sequences: sagittal T1, T2, STIR;  axial T1, T2. CONTRAST:  None. FINDINGS: 5 mm grade 1 anterolisthesis of L4-5 is secondary to facet arthrosis. No evidence of spondylolysis. Moderate facet arthrosis L4-S1. Vertebral body heights are maintained. Degenerative bone marrow edema is in the right L4-5 facets. No marrow replacing process. Mild disc desiccation and vacuum disc phenomenon at L4-5. No discitis. The conus medullaris terminates at L1. Signal and caliber of the distal spinal cord are within normal limits. The paraspinal soft tissues are within normal limits. Lower thoracic spine: No herniation or stenosis. L1-L2:  No herniation or stenosis. L2-L3:  Minimal diffuse disc bulge. No stenosis. L3-L4:  Diffuse disc bulge. No stenosis. L4-L5:  Uncovered diffuse disc bulge. Superimposed left central disc extrusion migrates routine millimeters cranially. Facet arthrosis. Mild central spinal canal stenosis. Moderate left foraminal stenosis and likely mass effect on the left L4 nerve. Mild right foraminal stenosis. L5-S1:  Minimal diffuse disc bulge. Facet arthrosis. No stenosis. IMPRESSION: 1. L4-5 left central disc extrusion likely affects the left L4 nerve. 2. L4-5 grade 1 anterolisthesis secondary to facet arthrosis. 3. No fracture. Assessment:     L4-5 spondylolisthesis with spinal stenosis and facet arthrosis  Acute L L4-5 HNP  LLE sciatica      Plan:     Cont medical management for now  May benefit from surgery.   Discussed in detail L4-5 decompression and fusion vs discectomy  Will re-evaluate in AM            Nima Leonard MD

## 2018-05-18 NOTE — PROGRESS NOTES
Pharmacy Clarification of the Prior to Admission Medication Regimen Retrospective to the Admission Medication Reconciliation    The patient was interviewed regarding clarification of the prior to admission medication regimen. Colleague was present in room and obtained permission from patient to discuss drug regimen with visitor(s) present. Patient was questioned regarding use of any other inhalers, topical products, over the counter medications, herbal medications, vitamin products or ophthalmic/nasal/otic medication use. Information Obtained From: Patient, RX Query    Recommendations/Findings: The following amendments were made to the patient's active medication list on file at [facility]:     1) Additions:    evolocumab (REPATHA SYRINGE) syringe   ibuprofen (MOTRIN) 200 mg tablet   methylPREDNISolone (MEDROL) 4 mg tab    2) Removals: NONE    3) Changes:   omega 3-dha-epa-fish oil (FISH OIL) (Old regimen: (Strength 500 mg) take by mouth Zulema Galla regimen: (strength 1000 mg) 1000 mg daily)    4) Pertinent Pharmacy Findings:   evolocumab ([place]) syringe: Patient stated he 'has had a hard time' getting this agent regularly from his pharmacy, and has not had this agent in 'about a month'.  methylPREDNISolone (MEDROL) 4 mg tab: Patient started a 6 day regimen on 18. Patient has completed 2 days of therapy as of 18. PTA medication list was corrected to the following:     Prior to Admission Medications   Prescriptions Last Dose Informant Patient Reported? Taking?   aspirin 81 mg chewable tablet 2018 at Unknown time Self Yes Yes   Sig: Take 81 mg by mouth daily. clopidogrel (PLAVIX) 75 mg tab 2018 at Unknown time Self Yes Yes   Sig: Take 75 mg by mouth daily. evolocumab (REPATHA SYRINGE) syringe 2018 at Unknown time Self Yes Yes   Si mg by SubCUTAneous route every fourteen (14) days.    ezetimibe (ZETIA) 10 mg tablet 2018 at Unknown time Self Yes Yes   Sig: Take 10 mg by mouth daily. ibuprofen (MOTRIN) 200 mg tablet 5/18/2018 at Unknown time Self Yes Yes   Sig: Take 400 mg by mouth every eight (8) hours as needed for Pain. methylPREDNISolone (MEDROL) 4 mg tab 5/17/2018 at Unknown time Self Yes Yes   Sig: Take 4-20 mg by mouth Follow dosing instructions. Take 6 tabs (24 mg) day 1, take 5 tabs (20 mg) day 2, take 4 tabs (16 mg) day 3, take 3 tabs (12 mg) day 4, take 2 tabs (8 mg) day 5, take 1 tab (4 mg) day 6   multivitamin (ONE A DAY) tablet 5/17/2018 at Unknown time Self Yes Yes   Sig: Take 1 Tab by mouth daily. omega 3-dha-epa-fish oil (FISH OIL) 100-160-1,000 mg cap 5/17/2018 at Unknown time Self Yes Yes   Sig: Take 1,000 mg by mouth daily. rosuvastatin (CRESTOR) 40 mg tablet  Self Yes Yes   Sig: Take 40 mg by mouth daily.       Facility-Administered Medications: None          Thank you,  Macie Clark, CPhT  Medication History Pharmacy Technician

## 2018-05-18 NOTE — H&P
ORTHOPAEDIC H&P NOTE    Subjective:     Date of Consultation:  May 18, 2018      Luisito Boykin is a 59 y.o. male who is being seen constant, progressively worsening left lower back pain since onset 3 days ago. Pt reports that the pain radiates down the left lower extremity to the shin. Pt explains that his mild back pain began after a workout two days ago that has gotten progressively worse. Doesn't recall an exact incident/injury. Pt endorses an inability to stand or walk this morning secondary to exacerbation of pain, explaining that he felt weak, but symptoms improved after sitting down. Denies new numbness or paraesthesia of the LE. Denies bowel or bladder incontinence. Ambulates at baseline unassisted. Was seen yesterday by Dr Guy Dobson started on medrol does pack. Patient Active Problem List    Diagnosis Date Noted    Lumbar disc herniation 05/18/2018    Intractable back pain 05/18/2018     History reviewed. No pertinent family history. Social History   Substance Use Topics    Smoking status: Never Smoker    Smokeless tobacco: Never Used    Alcohol use Yes      Comment: social     Past Medical History:   Diagnosis Date    CAD (coronary artery disease)     Hyperlipidemia       Past Surgical History:   Procedure Laterality Date    HX ANGIOPLASTY      HX APPENDECTOMY      HX HERNIA REPAIR      HX TONSILLECTOMY      HX WISDOM TEETH EXTRACTION        Prior to Admission medications    Medication Sig Start Date End Date Taking? Authorizing Provider   aspirin 81 mg chewable tablet Take 81 mg by mouth daily. Yes Lyle Denis MD   clopidogrel (PLAVIX) 75 mg tab Take 75 mg by mouth daily. Yes Lyle Denis MD   rosuvastatin (CRESTOR) 40 mg tablet Take 40 mg by mouth daily. Yes Lyle Denis MD   ezetimibe (ZETIA) 10 mg tablet Take 10 mg by mouth daily. Yes Lyle Denis MD   multivitamin (ONE A DAY) tablet Take 1 Tab by mouth daily.    Yes Lyle Denis MD   Omega-3 Fatty Acids (FISH OIL) 500 mg cap Take  by mouth. Yes Phys Other, MD     Current Facility-Administered Medications   Medication Dose Route Frequency    morphine injection 4 mg  4 mg IntraVENous NOW     Current Outpatient Prescriptions   Medication Sig    aspirin 81 mg chewable tablet Take 81 mg by mouth daily.  clopidogrel (PLAVIX) 75 mg tab Take 75 mg by mouth daily.  rosuvastatin (CRESTOR) 40 mg tablet Take 40 mg by mouth daily.  ezetimibe (ZETIA) 10 mg tablet Take 10 mg by mouth daily.  multivitamin (ONE A DAY) tablet Take 1 Tab by mouth daily.  Omega-3 Fatty Acids (FISH OIL) 500 mg cap Take  by mouth. No Known Allergies     Review of Systems:  A comprehensive review of systems was negative except for that written in the HPI. Mental Status: no dementia    Objective:     Patient Vitals for the past 8 hrs:   BP Temp Pulse Resp SpO2 Height Weight   18 1256 128/88 - - - - - -   18 1030 120/82 - - - 97 % - -   18 1000 126/81 - - - 98 % - -   18 0930 136/88 - - - 98 % - -   18 0929 (!) 125/108 97.3 °F (36.3 °C) 72 14 97 % 5' 7\" (1.702 m) 65.8 kg (145 lb)     Temp (24hrs), Av.3 °F (36.3 °C), Min:97.3 °F (36.3 °C), Max:97.3 °F (36.3 °C)      Gen: Well-developed,  in no acute distress   HEENT: Pink conjunctivae, hearing intact to voice, moist mucous membranes   Neck: Supple  Resp: No respiratory distress   Card: RRR, palpable distal pulse-equal bilaterally, birsk cap refill all distal digits   Abd: Soft, non-distended  Musc: neurovascular exam intact LE bilat. No focal muscular weakness of the lower ext, 5/5 strength with MMT of the LE bilat. .   Skin: No skin breakdown noted. Skin warm, pink, dry  Neuro: Cranial nerves are grossly intact, no focal motor weakness,  follows commands appropriately   Psych: Good insight, oriented to person, place and time, alert    Imaging Review: MRI LUMB SPINE WO CONT   INDICATION:  Low back pain since injury 2 days ago. Now unable to urinate.    COMPARISON: None   TECHNIQUE: MR imaging of the lumbar spine was performed using the following  sequences: sagittal T1, T2, STIR;  axial T1, T2.    CONTRAST:  None.   FINDINGS:   5 mm grade 1 anterolisthesis of L4-5 is secondary to facet arthrosis. No  evidence of spondylolysis. Moderate facet arthrosis L4-S1. Vertebral body  heights are maintained. Degenerative bone marrow edema is in the right L4-5  facets. No marrow replacing process. Mild disc desiccation and vacuum disc  phenomenon at L4-5. No discitis.   The conus medullaris terminates at L1. Signal and caliber of the distal spinal  cord are within normal limits.    The paraspinal soft tissues are within normal limits.   Lower thoracic spine: No herniation or stenosis.  L1-L2:  No herniation or stenosis.   L2-L3:  Minimal diffuse disc bulge. No stenosis.  L3-L4:  Diffuse disc bulge. No stenosis.  L4-L5:  Uncovered diffuse disc bulge. Superimposed left central disc extrusion  migrates routine millimeters cranially. Facet arthrosis. Mild central spinal  canal stenosis. Moderate left foraminal stenosis and likely mass effect on the  left L4 nerve. Mild right foraminal stenosis.  L5-S1:  Minimal diffuse disc bulge. Facet arthrosis. No stenosis. IMPRESSION:   1. L4-5 left central disc extrusion likely affects the left L4 nerve. 2. L4-5 grade 1 anterolisthesis secondary to facet arthrosis. 3. No fracture.     Labs:   Recent Results (from the past 24 hour(s))   EKG, 12 LEAD, INITIAL    Collection Time: 05/18/18  9:34 AM   Result Value Ref Range    Ventricular Rate 67 BPM    Atrial Rate 67 BPM    P-R Interval 156 ms    QRS Duration 104 ms    Q-T Interval 430 ms    QTC Calculation (Bezet) 454 ms    Calculated P Axis 78 degrees    Calculated R Axis -6 degrees    Calculated T Axis 66 degrees    Diagnosis       Normal sinus rhythm  Possible Left atrial enlargement  RSR' or QR pattern in V1 suggests right ventricular conduction delay  Non-specific ST changes anteriorly  No previous ECGs available  Confirmed by Larisa Moreno ([id number]) on 5/18/2018 11:57:49 AM     CBC WITH AUTOMATED DIFF    Collection Time: 05/18/18  9:51 AM   Result Value Ref Range    WBC 6.9 4.1 - 11.1 K/uL    RBC 4.65 4.10 - 5.70 M/uL    HGB 14.4 12.1 - 17.0 g/dL    HCT 42.7 36.6 - 50.3 %    MCV 91.8 80.0 - 99.0 FL    MCH 31.0 26.0 - 34.0 PG    MCHC 33.7 30.0 - 36.5 g/dL    RDW 12.6 11.5 - 14.5 %    PLATELET 746 511 - 598 K/uL    MPV 10.0 8.9 - 12.9 FL    NRBC 0.0 0  WBC    ABSOLUTE NRBC 0.00 0.00 - 0.01 K/uL    NEUTROPHILS 64 32 - 75 %    LYMPHOCYTES 25 12 - 49 %    MONOCYTES 10 5 - 13 %    EOSINOPHILS 1 0 - 7 %    BASOPHILS 1 0 - 1 %    IMMATURE GRANULOCYTES 0 0.0 - 0.5 %    ABS. NEUTROPHILS 4.4 1.8 - 8.0 K/UL    ABS. LYMPHOCYTES 1.7 0.8 - 3.5 K/UL    ABS. MONOCYTES 0.7 0.0 - 1.0 K/UL    ABS. EOSINOPHILS 0.0 0.0 - 0.4 K/UL    ABS. BASOPHILS 0.1 0.0 - 0.1 K/UL    ABS. IMM. GRANS. 0.0 0.00 - 0.04 K/UL    DF AUTOMATED     METABOLIC PANEL, COMPREHENSIVE    Collection Time: 05/18/18  9:51 AM   Result Value Ref Range    Sodium 139 136 - 145 mmol/L    Potassium 3.9 3.5 - 5.1 mmol/L    Chloride 106 97 - 108 mmol/L    CO2 30 21 - 32 mmol/L    Anion gap 3 (L) 5 - 15 mmol/L    Glucose 104 (H) 65 - 100 mg/dL    BUN 27 (H) 6 - 20 MG/DL    Creatinine 0.98 0.70 - 1.30 MG/DL    BUN/Creatinine ratio 28 (H) 12 - 20      GFR est AA >60 >60 ml/min/1.73m2    GFR est non-AA >60 >60 ml/min/1.73m2    Calcium 8.7 8.5 - 10.1 MG/DL    Bilirubin, total 0.7 0.2 - 1.0 MG/DL    ALT (SGPT) 27 12 - 78 U/L    AST (SGOT) 18 15 - 37 U/L    Alk.  phosphatase 46 45 - 117 U/L    Protein, total 6.9 6.4 - 8.2 g/dL    Albumin 4.2 3.5 - 5.0 g/dL    Globulin 2.7 2.0 - 4.0 g/dL    A-G Ratio 1.6 1.1 - 2.2     TROPONIN I    Collection Time: 05/18/18  9:51 AM   Result Value Ref Range    Troponin-I, Qt. <0.04 <0.05 ng/mL         Impression:     Patient Active Problem List    Diagnosis Date Noted    Lumbar disc herniation 05/18/2018    Intractable back pain 05/18/2018     Principal Problem:    Lumbar disc herniation (5/18/2018)    Active Problems:    Intractable back pain (5/18/2018)        Plan:   Intractable pain, acute L4/5 disc herniation   Standing lumbar films ordered  Will admit for pain control, possible sx treatment     Dr. Sury Nichole aware and agrees with plan as above.         Jose Shen PA-C  [employer]

## 2018-05-18 NOTE — ED NOTES
Attempted to call report. No answer to extention [phone] aKli Todd RN). Will re- attempt. Attempted to call report. John Gallagher unable to take report at this time.

## 2018-05-19 LAB
APPEARANCE UR: CLEAR
BACTERIA URNS QL MICRO: NEGATIVE /HPF
BILIRUB UR QL: NEGATIVE
COLOR UR: ABNORMAL
EPITH CASTS URNS QL MICRO: ABNORMAL /LPF
GLUCOSE UR STRIP.AUTO-MCNC: 500 MG/DL
HGB UR QL STRIP: NEGATIVE
HYALINE CASTS URNS QL MICRO: ABNORMAL /LPF (ref 0–5)
KETONES UR QL STRIP.AUTO: NEGATIVE MG/DL
LEUKOCYTE ESTERASE UR QL STRIP.AUTO: NEGATIVE
NITRITE UR QL STRIP.AUTO: NEGATIVE
PH UR STRIP: 6 [PH] (ref 5–8)
PROT UR STRIP-MCNC: NEGATIVE MG/DL
RBC #/AREA URNS HPF: ABNORMAL /HPF (ref 0–5)
SP GR UR REFRACTOMETRY: 1.02 (ref 1–1.03)
UA: UC IF INDICATED,UAUC: ABNORMAL
UROBILINOGEN UR QL STRIP.AUTO: 1 EU/DL (ref 0.2–1)
WBC URNS QL MICRO: ABNORMAL /HPF (ref 0–4)

## 2018-05-19 PROCEDURE — 74011250636 HC RX REV CODE- 250/636: Performed by: PHYSICIAN ASSISTANT

## 2018-05-19 PROCEDURE — 74011250637 HC RX REV CODE- 250/637: Performed by: PHYSICIAN ASSISTANT

## 2018-05-19 PROCEDURE — 99218 HC RM OBSERVATION: CPT

## 2018-05-19 PROCEDURE — 81001 URINALYSIS AUTO W/SCOPE: CPT | Performed by: EMERGENCY MEDICINE

## 2018-05-19 RX ORDER — DEXAMETHASONE SODIUM PHOSPHATE 4 MG/ML
10 INJECTION, SOLUTION INTRA-ARTICULAR; INTRALESIONAL; INTRAMUSCULAR; INTRAVENOUS; SOFT TISSUE DAILY
Status: DISCONTINUED | OUTPATIENT
Start: 2018-05-19 | End: 2018-05-22 | Stop reason: HOSPADM

## 2018-05-19 RX ORDER — DEXAMETHASONE SODIUM PHOSPHATE 4 MG/ML
10 INJECTION, SOLUTION INTRA-ARTICULAR; INTRALESIONAL; INTRAMUSCULAR; INTRAVENOUS; SOFT TISSUE DAILY
Status: DISCONTINUED | OUTPATIENT
Start: 2018-05-20 | End: 2018-05-19

## 2018-05-19 RX ADMIN — Medication 10 ML: at 06:16

## 2018-05-19 RX ADMIN — ATORVASTATIN CALCIUM 80 MG: 40 TABLET, FILM COATED ORAL at 08:12

## 2018-05-19 RX ADMIN — DOCUSATE SODIUM 100 MG: 100 CAPSULE, LIQUID FILLED ORAL at 08:12

## 2018-05-19 RX ADMIN — DOCUSATE SODIUM 100 MG: 100 CAPSULE, LIQUID FILLED ORAL at 17:03

## 2018-05-19 RX ADMIN — Medication 10 ML: at 20:18

## 2018-05-19 RX ADMIN — OXYCODONE HYDROCHLORIDE 10 MG: 5 TABLET ORAL at 01:12

## 2018-05-19 RX ADMIN — Medication 10 ML: at 23:58

## 2018-05-19 RX ADMIN — Medication 10 ML: at 12:28

## 2018-05-19 RX ADMIN — OXYCODONE HYDROCHLORIDE 5 MG: 5 TABLET ORAL at 18:13

## 2018-05-19 RX ADMIN — OXYCODONE HYDROCHLORIDE 5 MG: 5 TABLET ORAL at 23:57

## 2018-05-19 RX ADMIN — OXYCODONE HYDROCHLORIDE 5 MG: 5 TABLET ORAL at 12:28

## 2018-05-19 RX ADMIN — DEXAMETHASONE SODIUM PHOSPHATE 10 MG: 4 INJECTION, SOLUTION INTRAMUSCULAR; INTRAVENOUS at 15:09

## 2018-05-19 RX ADMIN — OMEGA-3 FATTY ACIDS CAP 1000 MG 1 CAPSULE: 1000 CAP at 08:12

## 2018-05-19 RX ADMIN — EZETIMIBE 10 MG: 10 TABLET ORAL at 08:12

## 2018-05-19 RX ADMIN — OXYCODONE HYDROCHLORIDE 10 MG: 5 TABLET ORAL at 06:14

## 2018-05-19 NOTE — PROGRESS NOTES
ORTHO - Progress Note      Adrián Fragoso     [id number]  male    59 y.o.    1955    Admit date:  2018  Admitting Physician:  Jesus Alberto Jules MD   Consulting Physician(s): Treatment Team: Attending Provider: Jesus Alberto Jules MD; Utilization Review: Tahir Mendez RN    SUBJECTIVE:     Adrián Fragoso is a 59 y.o. male admitted for intractable LBP / radicular LLE pain. States he was able to get up to the restroom but was unable to stand longer than two minuets before his pain was debilitating     OBJECTIVE:       Physical Exam:  General: alert, cooperative, no distress. Gastrointestinal:  Soft, non-distended . Cardiovascular: equal pulses in the lower extremities,  Brisk cap refill in all distal extremities   Genitourinary: Voiding independently   Respiratory: No respiratory distress   Neurological:Neurovascular exam within normal limits. Senstion intact: LE.    Motor: + DF/PF/EHL/QUAD. Musculoskeletal: Karel's sign negative in bilateral lower extremities. Calves soft, supple, non-tender upon palpation or with passive stretch.     Vital Signs:       Patient Vitals for the past 8 hrs:   BP Temp Pulse Resp SpO2   18 0738 136/87 97.7 °F (36.5 °C) 62 18 97 %   18 0530 127/81 97.7 °F (36.5 °C) 61 18 98 %                                          Temp (24hrs), Av.7 °F (36.5 °C), Min:97.5 °F (36.4 °C), Max:97.9 °F (36.6 °C)      Date 18 0700 - 18 0659   Shift 9867-0034 5397-2259 8266-0795 24 Hour Total   I  N  T  A  K  E   I.V.  (mL/kg/hr) 1568.3   1568.3    Shift Total  (mL/kg) 1568.3  (23.8)   1568.3  (23.8)   O  U  T  P  U  T   Shift Total  (mL/kg)       Weight (kg) 65.8 65.8 65.8 65.8     Labs:        Recent Labs      18   0951   HCT  42.7   HGB  14.4     PT/OT:              ASSESSMENT / PLAN:   Principal Problem:    Lumbar disc herniation (2018)    Active Problems:    Intractable back pain (2018)      Intractable low back pain (2018)    Continue to adjust pain mgt meds.   Out of bed with assistance  Considering treatment options- possibly SX monday      Signed By: Kirt Noguera PA-C

## 2018-05-19 NOTE — ROUTINE PROCESS
Bedside and Verbal shift change report given to Suhas RN (oncoming nurse) by Nisha Hemphill RN (offgoing nurse). Report included the following information SBAR, Kardex, Intake/Output, MAR and Recent Results.

## 2018-05-19 NOTE — PROGRESS NOTES
CM met with Pt to provide literature on Observation Status. Pt received the state form for observation and voiced understanding of information received.     Regino Thomas LCSW   Ext # [phone]

## 2018-05-20 PROCEDURE — 99218 HC RM OBSERVATION: CPT

## 2018-05-20 PROCEDURE — 74011250636 HC RX REV CODE- 250/636: Performed by: PHYSICIAN ASSISTANT

## 2018-05-20 PROCEDURE — 74011250637 HC RX REV CODE- 250/637: Performed by: PHYSICIAN ASSISTANT

## 2018-05-20 RX ADMIN — OXYCODONE HYDROCHLORIDE 5 MG: 5 TABLET ORAL at 23:00

## 2018-05-20 RX ADMIN — DEXAMETHASONE SODIUM PHOSPHATE 10 MG: 4 INJECTION, SOLUTION INTRAMUSCULAR; INTRAVENOUS at 08:29

## 2018-05-20 RX ADMIN — DOCUSATE SODIUM 100 MG: 100 CAPSULE, LIQUID FILLED ORAL at 08:28

## 2018-05-20 RX ADMIN — ATORVASTATIN CALCIUM 80 MG: 40 TABLET, FILM COATED ORAL at 08:29

## 2018-05-20 RX ADMIN — Medication 10 ML: at 05:22

## 2018-05-20 RX ADMIN — LORAZEPAM 2 MG: 2 INJECTION INTRAMUSCULAR; INTRAVENOUS at 04:59

## 2018-05-20 RX ADMIN — Medication 10 ML: at 23:01

## 2018-05-20 RX ADMIN — OXYCODONE HYDROCHLORIDE 5 MG: 5 TABLET ORAL at 18:08

## 2018-05-20 RX ADMIN — EZETIMIBE 10 MG: 10 TABLET ORAL at 08:29

## 2018-05-20 RX ADMIN — LORAZEPAM 2 MG: 2 INJECTION INTRAMUSCULAR; INTRAVENOUS at 23:00

## 2018-05-20 RX ADMIN — OXYCODONE HYDROCHLORIDE 10 MG: 5 TABLET ORAL at 11:32

## 2018-05-20 RX ADMIN — Medication 10 ML: at 11:32

## 2018-05-20 RX ADMIN — DOCUSATE SODIUM 100 MG: 100 CAPSULE, LIQUID FILLED ORAL at 17:05

## 2018-05-20 RX ADMIN — OMEGA-3 FATTY ACIDS CAP 1000 MG 1 CAPSULE: 1000 CAP at 08:29

## 2018-05-20 RX ADMIN — OXYCODONE HYDROCHLORIDE 5 MG: 5 TABLET ORAL at 04:59

## 2018-05-20 NOTE — ROUTINE PROCESS
Bedside and Verbal shift change report given to Suhas RN (oncoming nurse) by Marcheta Sicard RN (offgoing nurse). Report included the following information SBAR, Kardex, Intake/Output, MAR and Recent Results.

## 2018-05-20 NOTE — PROGRESS NOTES
ORTHO - Progress Note      Carmenza Gamble     [id number]  male    59 y.o.    1955  Admit date:  2018  Admitting Physician:  Prakash Donaldson MD       SUBJECTIVE:     Carmenza Gamble is a 59 y.o. male admitted for intractable LBP / radicular LLE pain. States he was able to get up to the shower this morning but was unable to stand longer than two minuets before his pain was debilitating. OBJECTIVE:       Physical Exam:  General: alert, cooperative, no distress. Gastrointestinal:  Soft, non-distended . Cardiovascular: equal pulses in the lower extremities,  Brisk cap refill in all distal extremities   Genitourinary: Voiding independently   Respiratory: No respiratory distress   Neurological:Neurovascular exam within normal limits. Senstion intact: LE.                         Motor: + DF/PF/EHL/QUAD. Musculoskeletal: Karel's sign negative in bilateral lower extremities. Calves soft, supple, non-tender upon palpation or with passive stretch. Vital Signs:       Patient Vitals for the past 8 hrs:   BP Temp Pulse Resp SpO2   18 0744 140/84 97.7 °F (36.5 °C) 70 18 97 %   18 0430 131/76 97.6 °F (36.4 °C) 71 16 97 %                                          Temp (24hrs), Av.6 °F (36.4 °C), Min:97.2 °F (36.2 °C), Max:98.1 °F (36.7 °C)      Date 18 0700 - 18 0659   Shift 6974-9847 5390-2799 4991-6852 24 Hour Total   I  N  T  A  K  E   I.V.  (mL/kg/hr) 1685   1685    Shift Total  (mL/kg) 1685  (25.6)   1685  (25.6)   O  U  T  P  U  T   Shift Total  (mL/kg)       Weight (kg) 65.8 65.8 65.8 65.8     Labs:        Recent Labs      18   0951   HCT  42.7   HGB  14.4     PT/OT:              ASSESSMENT / PLAN:   Principal Problem:    Lumbar disc herniation (2018)    Active Problems:    Intractable back pain (2018)      Intractable low back pain (2018)    Continue pain mgt.   Out of bed with assistance  Second opinion from neurosx Monday morning   Considering treatment options- possibly SX(decompression and fusion) monday    Signed By: Joseline Sanchez PA-C

## 2018-05-21 ENCOUNTER — ANESTHESIA (OUTPATIENT)
Dept: SURGERY | Age: 63
DRG: 520 | End: 2018-05-21
Payer: COMMERCIAL

## 2018-05-21 ENCOUNTER — APPOINTMENT (OUTPATIENT)
Dept: GENERAL RADIOLOGY | Age: 63
DRG: 520 | End: 2018-05-21
Attending: NEUROLOGICAL SURGERY
Payer: COMMERCIAL

## 2018-05-21 ENCOUNTER — ANESTHESIA EVENT (OUTPATIENT)
Dept: SURGERY | Age: 63
DRG: 520 | End: 2018-05-21
Payer: COMMERCIAL

## 2018-05-21 LAB
INR PPP: 1.1 (ref 0.9–1.1)
PROTHROMBIN TIME: 11.4 SEC (ref 9–11.1)

## 2018-05-21 PROCEDURE — 77030026438 HC STYL ET INTUB CARD -A: Performed by: ANESTHESIOLOGY

## 2018-05-21 PROCEDURE — 74011250636 HC RX REV CODE- 250/636: Performed by: ORTHOPAEDIC SURGERY

## 2018-05-21 PROCEDURE — 74011250636 HC RX REV CODE- 250/636: Performed by: ANESTHESIOLOGY

## 2018-05-21 PROCEDURE — 77030014647 HC SEAL FBRN TISSL BAXT -D: Performed by: NEUROLOGICAL SURGERY

## 2018-05-21 PROCEDURE — 74011000250 HC RX REV CODE- 250: Performed by: NEUROLOGICAL SURGERY

## 2018-05-21 PROCEDURE — 74011250636 HC RX REV CODE- 250/636: Performed by: PHYSICIAN ASSISTANT

## 2018-05-21 PROCEDURE — 77030003666 HC NDL SPINAL BD -A: Performed by: NEUROLOGICAL SURGERY

## 2018-05-21 PROCEDURE — 99218 HC RM OBSERVATION: CPT

## 2018-05-21 PROCEDURE — 77030003029 HC SUT VCRL J&J -B: Performed by: NEUROLOGICAL SURGERY

## 2018-05-21 PROCEDURE — 77030003028 HC SUT VCRL J&J -A: Performed by: NEUROLOGICAL SURGERY

## 2018-05-21 PROCEDURE — 74011250636 HC RX REV CODE- 250/636

## 2018-05-21 PROCEDURE — 77030002987 HC SUT PROL J&J -B: Performed by: NEUROLOGICAL SURGERY

## 2018-05-21 PROCEDURE — 74011250636 HC RX REV CODE- 250/636: Performed by: NEUROLOGICAL SURGERY

## 2018-05-21 PROCEDURE — 65270000029 HC RM PRIVATE

## 2018-05-21 PROCEDURE — 76010000172 HC OR TIME 2.5 TO 3 HR INTENSV-TIER 1: Performed by: NEUROLOGICAL SURGERY

## 2018-05-21 PROCEDURE — 74011250637 HC RX REV CODE- 250/637: Performed by: PHYSICIAN ASSISTANT

## 2018-05-21 PROCEDURE — 77010033678 HC OXYGEN DAILY

## 2018-05-21 PROCEDURE — 74011000250 HC RX REV CODE- 250

## 2018-05-21 PROCEDURE — 76210000006 HC OR PH I REC 0.5 TO 1 HR: Performed by: NEUROLOGICAL SURGERY

## 2018-05-21 PROCEDURE — 76060000036 HC ANESTHESIA 2.5 TO 3 HR: Performed by: NEUROLOGICAL SURGERY

## 2018-05-21 PROCEDURE — 88311 DECALCIFY TISSUE: CPT | Performed by: NEUROLOGICAL SURGERY

## 2018-05-21 PROCEDURE — 77030034849: Performed by: NEUROLOGICAL SURGERY

## 2018-05-21 PROCEDURE — 0SB20ZZ EXCISION OF LUMBAR VERTEBRAL DISC, OPEN APPROACH: ICD-10-PCS | Performed by: NEUROLOGICAL SURGERY

## 2018-05-21 PROCEDURE — 77030032490 HC SLV COMPR SCD KNE COVD -B: Performed by: NEUROLOGICAL SURGERY

## 2018-05-21 PROCEDURE — 77030008684 HC TU ET CUF COVD -B: Performed by: ANESTHESIOLOGY

## 2018-05-21 PROCEDURE — 85610 PROTHROMBIN TIME: CPT | Performed by: NURSE PRACTITIONER

## 2018-05-21 PROCEDURE — 77030020061 HC IV BLD WRMR ADMIN SET 3M -B: Performed by: ANESTHESIOLOGY

## 2018-05-21 PROCEDURE — 77030004391 HC BUR FLUT MEDT -C: Performed by: NEUROLOGICAL SURGERY

## 2018-05-21 PROCEDURE — 77030002933 HC SUT MCRYL J&J -A: Performed by: NEUROLOGICAL SURGERY

## 2018-05-21 PROCEDURE — 01NB0ZZ RELEASE LUMBAR NERVE, OPEN APPROACH: ICD-10-PCS | Performed by: NEUROLOGICAL SURGERY

## 2018-05-21 PROCEDURE — 77030013079 HC BLNKT BAIR HGGR 3M -A: Performed by: ANESTHESIOLOGY

## 2018-05-21 PROCEDURE — 36415 COLL VENOUS BLD VENIPUNCTURE: CPT | Performed by: NURSE PRACTITIONER

## 2018-05-21 PROCEDURE — 77030018846 HC SOL IRR STRL H20 ICUM -A: Performed by: NEUROLOGICAL SURGERY

## 2018-05-21 PROCEDURE — 77030013474 HC CRD BPLR DISP ADLR -A: Performed by: NEUROLOGICAL SURGERY

## 2018-05-21 PROCEDURE — 77030018836 HC SOL IRR NACL ICUM -A: Performed by: NEUROLOGICAL SURGERY

## 2018-05-21 PROCEDURE — 77030029099 HC BN WAX SSPC -A: Performed by: NEUROLOGICAL SURGERY

## 2018-05-21 PROCEDURE — 74011000272 HC RX REV CODE- 272: Performed by: NEUROLOGICAL SURGERY

## 2018-05-21 PROCEDURE — 88304 TISSUE EXAM BY PATHOLOGIST: CPT | Performed by: NEUROLOGICAL SURGERY

## 2018-05-21 RX ORDER — EPHEDRINE SULFATE 50 MG/ML
INJECTION, SOLUTION INTRAVENOUS AS NEEDED
Status: DISCONTINUED | OUTPATIENT
Start: 2018-05-21 | End: 2018-05-21 | Stop reason: HOSPADM

## 2018-05-21 RX ORDER — CEFAZOLIN SODIUM/WATER 2 G/20 ML
2 SYRINGE (ML) INTRAVENOUS EVERY 8 HOURS
Status: COMPLETED | OUTPATIENT
Start: 2018-05-22 | End: 2018-05-22

## 2018-05-21 RX ORDER — DEXAMETHASONE SODIUM PHOSPHATE 4 MG/ML
INJECTION, SOLUTION INTRA-ARTICULAR; INTRALESIONAL; INTRAMUSCULAR; INTRAVENOUS; SOFT TISSUE AS NEEDED
Status: DISCONTINUED | OUTPATIENT
Start: 2018-05-21 | End: 2018-05-21 | Stop reason: HOSPADM

## 2018-05-21 RX ORDER — NALOXONE HYDROCHLORIDE 0.4 MG/ML
0.4 INJECTION, SOLUTION INTRAMUSCULAR; INTRAVENOUS; SUBCUTANEOUS AS NEEDED
Status: DISCONTINUED | OUTPATIENT
Start: 2018-05-21 | End: 2018-05-22 | Stop reason: HOSPADM

## 2018-05-21 RX ORDER — SODIUM CHLORIDE 0.9 % (FLUSH) 0.9 %
5-10 SYRINGE (ML) INJECTION AS NEEDED
Status: DISCONTINUED | OUTPATIENT
Start: 2018-05-21 | End: 2018-05-21 | Stop reason: HOSPADM

## 2018-05-21 RX ORDER — AMOXICILLIN 250 MG
1 CAPSULE ORAL DAILY
Status: DISCONTINUED | OUTPATIENT
Start: 2018-05-22 | End: 2018-05-22 | Stop reason: HOSPADM

## 2018-05-21 RX ORDER — SODIUM CHLORIDE, SODIUM LACTATE, POTASSIUM CHLORIDE, CALCIUM CHLORIDE 600; 310; 30; 20 MG/100ML; MG/100ML; MG/100ML; MG/100ML
25 INJECTION, SOLUTION INTRAVENOUS CONTINUOUS
Status: DISCONTINUED | OUTPATIENT
Start: 2018-05-21 | End: 2018-05-21 | Stop reason: HOSPADM

## 2018-05-21 RX ORDER — SODIUM CHLORIDE 0.9 % (FLUSH) 0.9 %
5-10 SYRINGE (ML) INJECTION EVERY 8 HOURS
Status: DISCONTINUED | OUTPATIENT
Start: 2018-05-22 | End: 2018-05-22 | Stop reason: HOSPADM

## 2018-05-21 RX ORDER — ONDANSETRON 2 MG/ML
4 INJECTION INTRAMUSCULAR; INTRAVENOUS AS NEEDED
Status: DISCONTINUED | OUTPATIENT
Start: 2018-05-21 | End: 2018-05-21 | Stop reason: HOSPADM

## 2018-05-21 RX ORDER — ACETAMINOPHEN 325 MG/1
650 TABLET ORAL EVERY 6 HOURS
Status: DISCONTINUED | OUTPATIENT
Start: 2018-05-22 | End: 2018-05-22 | Stop reason: HOSPADM

## 2018-05-21 RX ORDER — MIDAZOLAM HYDROCHLORIDE 1 MG/ML
INJECTION, SOLUTION INTRAMUSCULAR; INTRAVENOUS AS NEEDED
Status: DISCONTINUED | OUTPATIENT
Start: 2018-05-21 | End: 2018-05-21 | Stop reason: HOSPADM

## 2018-05-21 RX ORDER — ONDANSETRON 2 MG/ML
INJECTION INTRAMUSCULAR; INTRAVENOUS AS NEEDED
Status: DISCONTINUED | OUTPATIENT
Start: 2018-05-21 | End: 2018-05-21 | Stop reason: HOSPADM

## 2018-05-21 RX ORDER — SODIUM CHLORIDE 0.9 % (FLUSH) 0.9 %
5-10 SYRINGE (ML) INJECTION EVERY 8 HOURS
Status: DISCONTINUED | OUTPATIENT
Start: 2018-05-21 | End: 2018-05-21 | Stop reason: HOSPADM

## 2018-05-21 RX ORDER — LIDOCAINE HYDROCHLORIDE 20 MG/ML
INJECTION, SOLUTION EPIDURAL; INFILTRATION; INTRACAUDAL; PERINEURAL AS NEEDED
Status: DISCONTINUED | OUTPATIENT
Start: 2018-05-21 | End: 2018-05-21 | Stop reason: HOSPADM

## 2018-05-21 RX ORDER — CEFAZOLIN SODIUM/WATER 2 G/20 ML
2 SYRINGE (ML) INTRAVENOUS ONCE
Status: DISCONTINUED | OUTPATIENT
Start: 2018-05-21 | End: 2018-05-21

## 2018-05-21 RX ORDER — SODIUM CHLORIDE, SODIUM LACTATE, POTASSIUM CHLORIDE, CALCIUM CHLORIDE 600; 310; 30; 20 MG/100ML; MG/100ML; MG/100ML; MG/100ML
INJECTION, SOLUTION INTRAVENOUS
Status: DISCONTINUED | OUTPATIENT
Start: 2018-05-21 | End: 2018-05-21 | Stop reason: HOSPADM

## 2018-05-21 RX ORDER — ACETAMINOPHEN 10 MG/ML
INJECTION, SOLUTION INTRAVENOUS AS NEEDED
Status: DISCONTINUED | OUTPATIENT
Start: 2018-05-21 | End: 2018-05-21 | Stop reason: HOSPADM

## 2018-05-21 RX ORDER — FENTANYL CITRATE 50 UG/ML
25 INJECTION, SOLUTION INTRAMUSCULAR; INTRAVENOUS
Status: DISCONTINUED | OUTPATIENT
Start: 2018-05-21 | End: 2018-05-21 | Stop reason: HOSPADM

## 2018-05-21 RX ORDER — ONDANSETRON 4 MG/1
4 TABLET, ORALLY DISINTEGRATING ORAL
Status: DISCONTINUED | OUTPATIENT
Start: 2018-05-21 | End: 2018-05-22 | Stop reason: HOSPADM

## 2018-05-21 RX ORDER — DIPHENHYDRAMINE HYDROCHLORIDE 50 MG/ML
12.5 INJECTION, SOLUTION INTRAMUSCULAR; INTRAVENOUS
Status: DISCONTINUED | OUTPATIENT
Start: 2018-05-21 | End: 2018-05-21 | Stop reason: HOSPADM

## 2018-05-21 RX ORDER — OXYCODONE HYDROCHLORIDE 5 MG/1
5 TABLET ORAL
Status: DISCONTINUED | OUTPATIENT
Start: 2018-05-21 | End: 2018-05-22 | Stop reason: HOSPADM

## 2018-05-21 RX ORDER — HYDROMORPHONE HYDROCHLORIDE 2 MG/ML
INJECTION, SOLUTION INTRAMUSCULAR; INTRAVENOUS; SUBCUTANEOUS AS NEEDED
Status: DISCONTINUED | OUTPATIENT
Start: 2018-05-21 | End: 2018-05-21 | Stop reason: HOSPADM

## 2018-05-21 RX ORDER — SODIUM CHLORIDE 9 MG/ML
125 INJECTION, SOLUTION INTRAVENOUS CONTINUOUS
Status: DISCONTINUED | OUTPATIENT
Start: 2018-05-21 | End: 2018-05-22 | Stop reason: HOSPADM

## 2018-05-21 RX ORDER — SUCCINYLCHOLINE CHLORIDE 20 MG/ML
INJECTION INTRAMUSCULAR; INTRAVENOUS AS NEEDED
Status: DISCONTINUED | OUTPATIENT
Start: 2018-05-21 | End: 2018-05-21 | Stop reason: HOSPADM

## 2018-05-21 RX ORDER — SODIUM CHLORIDE 0.9 % (FLUSH) 0.9 %
5-10 SYRINGE (ML) INJECTION AS NEEDED
Status: DISCONTINUED | OUTPATIENT
Start: 2018-05-21 | End: 2018-05-22 | Stop reason: HOSPADM

## 2018-05-21 RX ORDER — LIDOCAINE HYDROCHLORIDE 10 MG/ML
0.1 INJECTION, SOLUTION EPIDURAL; INFILTRATION; INTRACAUDAL; PERINEURAL AS NEEDED
Status: DISCONTINUED | OUTPATIENT
Start: 2018-05-21 | End: 2018-05-21 | Stop reason: HOSPADM

## 2018-05-21 RX ORDER — GLYCOPYRROLATE 0.2 MG/ML
INJECTION INTRAMUSCULAR; INTRAVENOUS AS NEEDED
Status: DISCONTINUED | OUTPATIENT
Start: 2018-05-21 | End: 2018-05-21 | Stop reason: HOSPADM

## 2018-05-21 RX ORDER — DIPHENHYDRAMINE HCL 25 MG
25 CAPSULE ORAL
Status: DISCONTINUED | OUTPATIENT
Start: 2018-05-21 | End: 2018-05-22 | Stop reason: HOSPADM

## 2018-05-21 RX ORDER — ROCURONIUM BROMIDE 10 MG/ML
INJECTION, SOLUTION INTRAVENOUS AS NEEDED
Status: DISCONTINUED | OUTPATIENT
Start: 2018-05-21 | End: 2018-05-21 | Stop reason: HOSPADM

## 2018-05-21 RX ORDER — CEFAZOLIN SODIUM/WATER 2 G/20 ML
2 SYRINGE (ML) INTRAVENOUS
Status: COMPLETED | OUTPATIENT
Start: 2018-05-21 | End: 2018-05-21

## 2018-05-21 RX ORDER — PROPOFOL 10 MG/ML
INJECTION, EMULSION INTRAVENOUS AS NEEDED
Status: DISCONTINUED | OUTPATIENT
Start: 2018-05-21 | End: 2018-05-21 | Stop reason: HOSPADM

## 2018-05-21 RX ORDER — FENTANYL CITRATE 50 UG/ML
INJECTION, SOLUTION INTRAMUSCULAR; INTRAVENOUS AS NEEDED
Status: DISCONTINUED | OUTPATIENT
Start: 2018-05-21 | End: 2018-05-21 | Stop reason: HOSPADM

## 2018-05-21 RX ORDER — NEOSTIGMINE METHYLSULFATE 1 MG/ML
INJECTION INTRAVENOUS AS NEEDED
Status: DISCONTINUED | OUTPATIENT
Start: 2018-05-21 | End: 2018-05-21 | Stop reason: HOSPADM

## 2018-05-21 RX ORDER — OXYCODONE HYDROCHLORIDE 5 MG/1
10 TABLET ORAL
Status: DISCONTINUED | OUTPATIENT
Start: 2018-05-21 | End: 2018-05-22 | Stop reason: HOSPADM

## 2018-05-21 RX ORDER — MORPHINE SULFATE 10 MG/ML
2 INJECTION, SOLUTION INTRAMUSCULAR; INTRAVENOUS
Status: DISCONTINUED | OUTPATIENT
Start: 2018-05-21 | End: 2018-05-21 | Stop reason: HOSPADM

## 2018-05-21 RX ORDER — METHYLPREDNISOLONE ACETATE 40 MG/ML
INJECTION, SUSPENSION INTRA-ARTICULAR; INTRALESIONAL; INTRAMUSCULAR; SOFT TISSUE AS NEEDED
Status: DISCONTINUED | OUTPATIENT
Start: 2018-05-21 | End: 2018-05-21 | Stop reason: HOSPADM

## 2018-05-21 RX ORDER — FACIAL-BODY WIPES
10 EACH TOPICAL DAILY PRN
Status: DISCONTINUED | OUTPATIENT
Start: 2018-05-21 | End: 2018-05-22 | Stop reason: HOSPADM

## 2018-05-21 RX ADMIN — HYDROMORPHONE HYDROCHLORIDE 0.2 MG: 2 INJECTION, SOLUTION INTRAMUSCULAR; INTRAVENOUS; SUBCUTANEOUS at 17:53

## 2018-05-21 RX ADMIN — DEXAMETHASONE SODIUM PHOSPHATE 10 MG: 4 INJECTION, SOLUTION INTRA-ARTICULAR; INTRALESIONAL; INTRAMUSCULAR; INTRAVENOUS; SOFT TISSUE at 16:57

## 2018-05-21 RX ADMIN — ACETAMINOPHEN 1000 MG: 10 INJECTION, SOLUTION INTRAVENOUS at 17:55

## 2018-05-21 RX ADMIN — LIDOCAINE HYDROCHLORIDE 40 MG: 20 INJECTION, SOLUTION EPIDURAL; INFILTRATION; INTRACAUDAL; PERINEURAL at 17:11

## 2018-05-21 RX ADMIN — FENTANYL CITRATE 25 MCG: 50 INJECTION, SOLUTION INTRAMUSCULAR; INTRAVENOUS at 20:16

## 2018-05-21 RX ADMIN — NEOSTIGMINE METHYLSULFATE 3 MG: 1 INJECTION INTRAVENOUS at 19:16

## 2018-05-21 RX ADMIN — Medication 2 G: at 17:35

## 2018-05-21 RX ADMIN — Medication 10 ML: at 14:06

## 2018-05-21 RX ADMIN — DEXAMETHASONE SODIUM PHOSPHATE 10 MG: 4 INJECTION, SOLUTION INTRAMUSCULAR; INTRAVENOUS at 09:00

## 2018-05-21 RX ADMIN — FENTANYL CITRATE 100 MCG: 50 INJECTION, SOLUTION INTRAMUSCULAR; INTRAVENOUS at 17:11

## 2018-05-21 RX ADMIN — ROCURONIUM BROMIDE 5 MG: 10 INJECTION, SOLUTION INTRAVENOUS at 17:11

## 2018-05-21 RX ADMIN — Medication 10 ML: at 07:09

## 2018-05-21 RX ADMIN — SODIUM CHLORIDE, SODIUM LACTATE, POTASSIUM CHLORIDE, CALCIUM CHLORIDE: 600; 310; 30; 20 INJECTION, SOLUTION INTRAVENOUS at 18:48

## 2018-05-21 RX ADMIN — HYDROMORPHONE HYDROCHLORIDE 0.2 MG: 2 INJECTION, SOLUTION INTRAMUSCULAR; INTRAVENOUS; SUBCUTANEOUS at 18:09

## 2018-05-21 RX ADMIN — OXYCODONE HYDROCHLORIDE 5 MG: 5 TABLET ORAL at 14:05

## 2018-05-21 RX ADMIN — GLYCOPYRROLATE 0.5 MG: 0.2 INJECTION INTRAMUSCULAR; INTRAVENOUS at 19:16

## 2018-05-21 RX ADMIN — OXYCODONE HYDROCHLORIDE 5 MG: 5 TABLET ORAL at 12:31

## 2018-05-21 RX ADMIN — OXYCODONE HYDROCHLORIDE 5 MG: 5 TABLET ORAL at 07:08

## 2018-05-21 RX ADMIN — FENTANYL CITRATE 25 MCG: 50 INJECTION, SOLUTION INTRAMUSCULAR; INTRAVENOUS at 20:19

## 2018-05-21 RX ADMIN — MIDAZOLAM HYDROCHLORIDE 2 MG: 1 INJECTION, SOLUTION INTRAMUSCULAR; INTRAVENOUS at 17:04

## 2018-05-21 RX ADMIN — HYDROMORPHONE HYDROCHLORIDE 0.4 MG: 2 INJECTION, SOLUTION INTRAMUSCULAR; INTRAVENOUS; SUBCUTANEOUS at 19:24

## 2018-05-21 RX ADMIN — PROPOFOL 160 MG: 10 INJECTION, EMULSION INTRAVENOUS at 17:11

## 2018-05-21 RX ADMIN — EPHEDRINE SULFATE 5 MG: 50 INJECTION, SOLUTION INTRAVENOUS at 18:15

## 2018-05-21 RX ADMIN — FENTANYL CITRATE 25 MCG: 50 INJECTION, SOLUTION INTRAMUSCULAR; INTRAVENOUS at 20:10

## 2018-05-21 RX ADMIN — SUCCINYLCHOLINE CHLORIDE 140 MG: 20 INJECTION INTRAMUSCULAR; INTRAVENOUS at 17:11

## 2018-05-21 RX ADMIN — ROCURONIUM BROMIDE 35 MG: 10 INJECTION, SOLUTION INTRAVENOUS at 17:20

## 2018-05-21 RX ADMIN — SODIUM CHLORIDE, SODIUM LACTATE, POTASSIUM CHLORIDE, CALCIUM CHLORIDE: 600; 310; 30; 20 INJECTION, SOLUTION INTRAVENOUS at 16:49

## 2018-05-21 RX ADMIN — FENTANYL CITRATE 25 MCG: 50 INJECTION, SOLUTION INTRAMUSCULAR; INTRAVENOUS at 20:13

## 2018-05-21 RX ADMIN — ONDANSETRON 4 MG: 2 INJECTION INTRAMUSCULAR; INTRAVENOUS at 16:57

## 2018-05-21 RX ADMIN — ROCURONIUM BROMIDE 10 MG: 10 INJECTION, SOLUTION INTRAVENOUS at 17:45

## 2018-05-21 RX ADMIN — SODIUM CHLORIDE 125 ML/HR: 900 INJECTION, SOLUTION INTRAVENOUS at 20:02

## 2018-05-21 RX ADMIN — HYDROMORPHONE HYDROCHLORIDE 1 MG: 2 INJECTION INTRAMUSCULAR; INTRAVENOUS; SUBCUTANEOUS at 21:00

## 2018-05-21 NOTE — ANESTHESIA PREPROCEDURE EVALUATION
Anesthetic History   No history of anesthetic complications            Review of Systems / Medical History  Patient summary reviewed, nursing notes reviewed and pertinent labs reviewed    Pulmonary  Within defined limits            Pertinent negatives: No shortness of breath     Neuro/Psych   Within defined limits           Cardiovascular              CAD, cardiac stents (x5; Last 1 placed 01/2017;  Off of plavix since 5/16) and hyperlipidemia  Pertinent negatives: No angina  Exercise tolerance: >4 METS  Comments: EF normal per patient.    GI/Hepatic/Renal  Within defined limits              Endo/Other  Within defined limits           Other Findings            Physical Exam    Airway  Mallampati: II  TM Distance: 4 - 6 cm  Neck ROM: normal range of motion   Mouth opening: Normal     Cardiovascular  Regular rate and rhythm,  S1 and S2 normal,  no murmur, click, rub, or gallop             Dental  No notable dental hx       Pulmonary  Breath sounds clear to auscultation               Abdominal  GI exam deferred       Other Findings            Anesthetic Plan    ASA: 2  Anesthesia type: general    Monitoring Plan: BIS      Induction: Intravenous  Anesthetic plan and risks discussed with: Patient

## 2018-05-21 NOTE — BRIEF OP NOTE
BRIEF OPERATIVE NOTE    Date of Procedure: 5/21/2018   Preoperative Diagnosis: HERNIATED LEFT LUMBAR DISC  Postoperative Diagnosis: HERNIATED LEFT LUMBAR DISC    Procedure(s):  SPINE LUMBAR LAMINECTOMY AND DISCECTOMY L4-5 LEFT  Surgeon(s) and Role:     * Kelly Scott MD - Primary         Surgical Assistant:    Surgical Staff:  Circ-1: Caralee Leyden, RN  Scrub Tech-1: Eden Rosenthal  Surg Asst-1: Padma Braswell  Surg Asst-Relief: Justin Gilmore  Event Time In   Incision Start 1751   Incision Close      Anesthesia: General   Estimated Blood Loss: none  Specimens:   ID Type Source Tests Collected by Time Destination   1 : L4-5 disc Preservative Lumbar  Kelly Scott MD 5/21/2018 1839 Pathology      Findings:multiple free fragments of herniated disc migrated superiorly to impinge upon the L4 root on the left  Complications: none  Implants: * No implants in log *

## 2018-05-21 NOTE — CONSULTS
[address]  Gonzales Schmidt  MR#: [id number]  : 1955  ACCOUNT #: [de-identified]   DATE OF SERVICE: 2018    HISTORY OF PRESENT ILLNESS:  A 81-year-old man with a lumbar herniated disk at L4-L5 on the left side. 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 He came into the emergency room on the day of admission 2018 complaining of severe pain radiating down the left leg getting progressively worse. Onset began about 3 days prior to that after he had been working out. He noted on admission that he had difficulty walking because of the pain. Denied any bowel and bladder deficits. Denied weakness of the lower extremities. He was seen by Dr. Yifan He and other members of the orthopedic team and requested a second opinion. SOCIAL HISTORY:  Nonsmoker. Alcohol use only. No other drug use. PAST MEDICAL HISTORY:  Coronary artery disease, hyperlipidemia. PAST SURGICAL HISTORY:  Angioplasty, appendectomy, hernia repair, tonsillectomy, wisdom tooth extraction. MEDICATIONS:  Prior to admission, baby aspirin, Plavix, Crestor, Zetia, multivitamin, fish oil. He was started on IV pain medication. His pain is significantly improved over the course of several days. Today he still complains though of left leg pain. It is intractable and he is unable to live with the symptoms as they are. He denies any bowel and bladder deficits. While he can walk, he has trouble doing so. REVIEW OF SYSTEMS:  No other GI, , respiratory, cardiac or endocrine, psychiatric, or neurologic complaints. PHYSICAL EXAMINATION:  VITAL SIGNS:  Temperature 98.8, pulse rate 72, respiratory rate 16, blood pressure 154/93. GENERAL:  He is awake,  alert, oriented to person, place and time, does appear to be in a fair amount of discomfort secondary to the pain. NECK:  Supple.     EXTREMITIES:  Straight leg raising is unlimited although he is on pain medication. NEUROLOGICAL:  Toes are downgoing. No clonus of the ankles. Gait and station are deferred. No pathologic reflexes. No clonus at the ankles. He has good strength in all muscle groups of both lower extremities including extensor hallucis longus and dorsiflexion of the foot. There are no sensory deficits to light touch or position. Cranial nerve function II-XII normal.  Speech is fluent. Mood and affect normal.      IMAGING STUDIES:  Include an MRI of the lumbar spine. It shows a disk herniation at L4-L5, particularly off to the left side migrates cephalad. There is a 5 mm slip of L4 on 5, but no evidence of any spondylolysis. ASSESSMENT:  Focal disk herniation L4-5 on the left side as the cause of his current lumbar radiculopathy. I had a long discussion with him regarding risks, benefits, alternatives and discussed different surgical procedures. In my opinion and after discussing this with some of my partners as well,  I would recommend a simple lumbar laminectomy, diskectomy at L4-5 on the left side and decompression of the nerve root. It would not require removing the entire disk. He would not require a fusion. Given the fact that the patient is an extremely active individual and wants to be able to get back to many of those activities, I think at this point, the risks of having to come back later on and do more surgery are low and his recovery certainly would be quicker with a smaller procedure. I have answered all of his questions. He wishes to proceed. We will go ahead and get him on the OR schedule for later today since he has already been here for several days. He has been off his Plavix for nearly 5 days now. I think that is fine to proceed with a laminectomy and diskectomy.       Elayne Spatz, MD [facility] / [facility]  D: 05/21/2018 12:37     T: 05/21/2018 13:09  JOB #: [id number]  CC: Juan Manuel Valerio MD

## 2018-05-21 NOTE — PROGRESS NOTES
10: 49AM  Initial Assessment:  CM met with pt to complete assessment and discuss d/c planning. Pt is a 58 yo male admitted for intractable low back pain. Prior to admission, pt was independent with ADLs and IADLs, including driving. Pt's wife, Yazmin Salcedo, is his emergency contact. Pt is currently in observation status. State notice provided by w/e CM. Pt possibly having surgery today. CM will continue to follow and assist with d/c planning. Care Management Interventions  PCP Verified by CM:  Yes Jc Garcia MD)  Transition of Care Consult (CM Consult): Discharge Planning  Current Support Network: Lives with Spouse, Own Home  Confirm Follow Up Transport: Self  Plan discussed with Pt/Family/Caregiver: Yes  Discharge Location  Discharge Placement: Home    SHELBY Haile  Care Manager        Reason for Admission:   Lower back pain                    RRAT Score:          2           Plan for utilizing home health:      TBD                    Likelihood of Readmission:  Low                          Transition of Care Plan:                  Home with spouse

## 2018-05-21 NOTE — PERIOP NOTES
Floor contacted spoke with deo ribeiro  Chart and kardex reviewed pt has been npo except meds. Consent will be obtained. vss reported. Pt being picked up by technician coming on stretcher.

## 2018-05-21 NOTE — INTERDISCIPLINARY ROUNDS
Bedside interdisciplinary rounds were held today to discuss patient plan of care and outcomes. The following members were present: Nurse Practitioner, Nurse, Clinical Care Leader, Pharmacy, Physical Therapy, and Case Management. Plan:  OR this evening with Dr. Dominick Fan.

## 2018-05-21 NOTE — PROGRESS NOTES
Full note dictated   Recommend lumbar laminectomy and discectomy at L45 left   No focal motor deficits on exam but persists with intractable pain in left leg/sciatica. Donna Tejeda  discussed risks and benefits and he agrees to proceed with surgery

## 2018-05-22 VITALS
SYSTOLIC BLOOD PRESSURE: 133 MMHG | RESPIRATION RATE: 14 BRPM | WEIGHT: 145 LBS | HEART RATE: 58 BPM | OXYGEN SATURATION: 98 % | TEMPERATURE: 97.6 F | DIASTOLIC BLOOD PRESSURE: 83 MMHG | HEIGHT: 67 IN | BODY MASS INDEX: 22.76 KG/M2

## 2018-05-22 PROCEDURE — 74011250636 HC RX REV CODE- 250/636: Performed by: NEUROLOGICAL SURGERY

## 2018-05-22 PROCEDURE — 74011250636 HC RX REV CODE- 250/636: Performed by: PHYSICIAN ASSISTANT

## 2018-05-22 PROCEDURE — 97161 PT EVAL LOW COMPLEX 20 MIN: CPT

## 2018-05-22 PROCEDURE — 74011250637 HC RX REV CODE- 250/637: Performed by: PHYSICIAN ASSISTANT

## 2018-05-22 PROCEDURE — 74011250637 HC RX REV CODE- 250/637: Performed by: NEUROLOGICAL SURGERY

## 2018-05-22 PROCEDURE — 97116 GAIT TRAINING THERAPY: CPT

## 2018-05-22 PROCEDURE — 77010033678 HC OXYGEN DAILY

## 2018-05-22 RX ORDER — AMOXICILLIN 250 MG
1 CAPSULE ORAL 2 TIMES DAILY
Qty: 60 TAB | Refills: 0 | Status: SHIPPED | OUTPATIENT
Start: 2018-05-22 | End: 2019-07-17 | Stop reason: ALTCHOICE

## 2018-05-22 RX ORDER — POLYETHYLENE GLYCOL 3350 17 G/17G
17 POWDER, FOR SOLUTION ORAL DAILY
Qty: 255 G | Refills: 0 | Status: SHIPPED | OUTPATIENT
Start: 2018-05-22 | End: 2018-06-06

## 2018-05-22 RX ORDER — IBUPROFEN 200 MG
400 TABLET ORAL
Status: ON HOLD | COMMUNITY
Start: 2018-05-22 | End: 2020-12-31

## 2018-05-22 RX ORDER — GUAIFENESIN 100 MG/5ML
81 LIQUID (ML) ORAL DAILY
Status: SHIPPED | COMMUNITY
Start: 2018-05-22

## 2018-05-22 RX ORDER — CLOPIDOGREL BISULFATE 75 MG/1
75 TABLET ORAL DAILY
Status: SHIPPED | COMMUNITY
Start: 2018-05-22 | End: 2019-07-17 | Stop reason: ALTCHOICE

## 2018-05-22 RX ORDER — OXYCODONE HYDROCHLORIDE 5 MG/1
5-10 TABLET ORAL
Qty: 35 TAB | Refills: 0 | Status: SHIPPED | OUTPATIENT
Start: 2018-05-22 | End: 2019-07-17 | Stop reason: ALTCHOICE

## 2018-05-22 RX ORDER — ACETAMINOPHEN 325 MG/1
650 TABLET ORAL EVERY 6 HOURS
Qty: 112 TAB | Refills: 0 | Status: SHIPPED | OUTPATIENT
Start: 2018-05-22 | End: 2018-06-05

## 2018-05-22 RX ADMIN — EZETIMIBE 10 MG: 10 TABLET ORAL at 09:04

## 2018-05-22 RX ADMIN — ATORVASTATIN CALCIUM 80 MG: 40 TABLET, FILM COATED ORAL at 09:04

## 2018-05-22 RX ADMIN — DOCUSATE SODIUM AND SENNOSIDES 1 TABLET: 8.6; 5 TABLET, FILM COATED ORAL at 00:27

## 2018-05-22 RX ADMIN — DEXAMETHASONE SODIUM PHOSPHATE 10 MG: 4 INJECTION, SOLUTION INTRAMUSCULAR; INTRAVENOUS at 09:06

## 2018-05-22 RX ADMIN — ACETAMINOPHEN 650 MG: 325 TABLET ORAL at 05:45

## 2018-05-22 RX ADMIN — Medication 2 G: at 00:27

## 2018-05-22 RX ADMIN — ACETAMINOPHEN 650 MG: 325 TABLET ORAL at 00:27

## 2018-05-22 RX ADMIN — Medication 2 G: at 00:22

## 2018-05-22 RX ADMIN — DOCUSATE SODIUM AND SENNOSIDES 1 TABLET: 8.6; 5 TABLET, FILM COATED ORAL at 09:04

## 2018-05-22 NOTE — PROGRESS NOTES
Pt will discharge home today and pt will be transported by family via private vehicle. There were no additional discharge referrals required for pt. Care Management Interventions  PCP Verified by CM: Yes Josiah Garces MD)  Mode of Transport at Discharge:  Other (see comment) (private vehicle )  Transition of Care Consult (CM Consult): Discharge Planning  Discharge Durable Medical Equipment: No  Health Maintenance Reviewed: Yes  Physical Therapy Consult: Yes  Occupational Therapy Consult: No  Speech Therapy Consult: No  Current Support Network: Lives with Spouse, Own Home  Confirm Follow Up Transport: Self  Plan discussed with Pt/Family/Caregiver: Yes  Discharge Location  Discharge Placement: Home with family assistance    SHELBY Romo, [address] [place] [address]   [phone]

## 2018-05-22 NOTE — PROGRESS NOTES
Spiritual Care Assessment/Progress Note  [facility]      NAME: Yariel Middleton      MRN: [id number]  AGE: 59 y.o. SEX: male  Mormonism Affiliation: No preference   Language: English     5/22/2018     Total Time (in minutes): 5     Spiritual Assessment begun in [facility] through conversation with:         [x]Patient        [] Family    [] Friend(s)        Reason for Consult: Initial/Spiritual assessment, patient floor     Spiritual beliefs: (Please include comment if needed)     [] Identifies with a danelle tradition:     [] Supported by a danelle community:      [] Claims no spiritual orientation:      [] Seeking spiritual identity:           [] Adheres to an individual form of spirituality:      [] Not able to assess:                     Identified resources for coping:      [] Prayer                               [] Music                  [] Guided Imagery     [] Family/friends                 [] Pet visits     [] Devotional reading                         [x] Unknown     [] Other:                                              Interventions offered during this visit: (See comments for more details)    Patient Interventions: Initial/Spiritual assessment, patient floor, Coping skills reviewed/reinforced, Affirmation of emotions/emotional suffering           Plan of Care:     [] Support spiritual and/or cultural needs    [] Support AMD and/or advance care planning process      [] Support grieving process   [] Coordinate Rites and/or Rituals    [] Coordination with community clergy   [x] No spiritual needs identified at this time   [] Detailed Plan of Care below (See Comments)  [] Make referral to Music Therapy  [] Make referral to Pet Therapy     [] Make referral to Addiction services  [] Make referral to [facility]  [] Make referral to Spiritual Care Partner  [] No future visits requested        [] Follow up visits as needed   Initial Spiritual Assessment in [facility] [id number] with JOSE pt.  Pt who works at [employer] accompanied by physician at bedside, reported to be going home shortly. Celebrated progress made with pt who reported that he was pleased with care and appeared to be in good spirits. No further needs at this time, extended blessing, stepped away to allow for conversation to continue with physician. GARIMA Levin

## 2018-05-22 NOTE — ROUTINE PROCESS
TRANSFER - IN REPORT:    Verbal report received from DARREN Kirkpatrick RN(name) on Homar Kerns  being received from OR(unit) for routine post - op      Report consisted of patients Situation, Background, Assessment and   Recommendations(SBAR). Information from the following report(s) OR Summary was reviewed with the receiving nurse. Opportunity for questions and clarification was provided. Assessment completed upon patients arrival to unit and care assumed.

## 2018-05-22 NOTE — ANESTHESIA POSTPROCEDURE EVALUATION
Post-Anesthesia Evaluation and Assessment    Patient: Hugo Hutchison MRN: [id number]  SSN: [id number]    YOB: 1955  Age: 59 y.o. Sex: male       Cardiovascular Function/Vital Signs  Visit Vitals    /78 (BP Patient Position: At rest)    Pulse 62    Temp 36.4 °C (97.6 °F)    Resp 18    Ht 5' 7\" (1.702 m)    Wt 65.8 kg (145 lb)    SpO2 95%    BMI 22.71 kg/m2       Patient is status post general anesthesia for Procedure(s):  SPINE LUMBAR LAMINECTOMY AND DISCECTOMY L4-5 LEFT. Nausea/Vomiting: None    Postoperative hydration reviewed and adequate. Pain:  Pain Scale 1: Numeric (0 - 10) (05/21/18 2030)  Pain Intensity 1: 3 (05/21/18 2030)   Managed    Neurological Status:   Neuro (WDL): Exceptions to WDL (05/21/18 2030)  Neuro  Neurologic State: Drowsy; Eyes open spontaneously;Sleeping (05/21/18 2030)  Orientation Level: Oriented to person;Oriented to place;Oriented to situation (05/21/18 2030)  Cognition: Follows commands (05/21/18 2030)  Speech: Clear (05/21/18 2030)  LUE Motor Response: Purposeful (05/21/18 2030)  LLE Motor Response: Purposeful;Weak (05/21/18 2030)  RUE Motor Response: Purposeful (05/21/18 2030)  RLE Motor Response: Purposeful;Weak (05/21/18 2030)   At baseline    Mental Status and Level of Consciousness: Arousable    Pulmonary Status:   O2 Device: Nasal cannula (05/21/18 2030)   Adequate oxygenation and airway patent    Complications related to anesthesia: None    Post-anesthesia assessment completed.  No concerns    Signed By: Zoë Steve MD     May 21, 2018

## 2018-05-22 NOTE — DISCHARGE INSTRUCTIONS
Isidoro Phelps M.D. What can I do?  The only exercise you should do is walking. Start by walking twice a day for 5 minutes, then increase by  2-3 minutes every day until you reach 25 minutes twice a day. DO NOT sit for long periods of time (20 minutes at a time for the first couple of weeks, then gradually increase.  No heavy lifting (5 lbs max); no straining, twisting, or bending.  No driving until your physician tells you it is ok. It is, however, ok to ride short distances in a car.  If you are required to wear a back brace, you may remove it when you are sleeping unless your doctor has advised against it.  If you are required to wear a cervical collar, you must sleep in it. You can remove it only for showers. What can I eat?  You may resume your regular home diet as tolerated. If your throat is sore, you may want to eat soft food for the first few days. When can I take a shower?  You may shower leaving on your occlusive (waterproof) dressing on allowing water to run over the dressing. The dressing may be removed in 5 days. The small pieces of tape (steri strips)  underneath it should stay on. Let water run over them, then pat dry gently.  Do not take baths or soak in pools.  You may remove your brace for showering. Medications:   Check with your physician before taking any anti-inflammatory medicines (Advil, Aleve, ibuprofen, aspirin).  Take prescription medicine as directed. DO NOT take more than prescribed. Call your physician if the prescribed dose is not sufficiently controlling your pain.  It is important to have regular bowel movements. Pain medications may cause constipation. Drink plenty of fluids, get enough fiber in your diet, and use stool softeners and drink prune juice to help prevent constipation. Do not take laxatives if at all possible except in severe situations.   It can result in a vicious cycle of constipation and diarrhea.  Do not put any ointments or creams on your incision unless directed to do so by your physician.  Tobacco products should be avoided during the postoperative phase. When do I see the physician again?  Please call your physicians office as soon as you get home to schedule your 1st post operative appointment. You should be seen approximately two weeks after your surgery. At this appointment you will see your doctors Assistant for a wound check and to answer any questions you may have.  You will see your physician approximately six weeks after your surgery.  If you had a fusion, you will need to get an order for xrays to be taken prior to the six week appointment. These should be done on the day of the appointment or 1-2 days before.  If you need the number to your doctors office, please request it from your nurse or see below. NOTIFY YOUR PHYSICIAN OF ANY OF THE FOLLOWING:     Fever above 101º for 24 hrs.  Nausea or vomiting.  Inability to urinate   Loss of bowel or bladder function (sudden onset of incontinence)   Changes in sensation (numbness, tingling, color change) in your extremities   Pain not relieved by your medicine.    Redness, swelling or drainage from your incision   Persistent pain in the calf of either leg   Development of severe pain      FOR APPOINTMENTS OR QUESTIONS CALL:    [facility]  [address]  [phone]

## 2018-05-22 NOTE — PROGRESS NOTES
POD !  afeb vss  Excellent progress  No leg pain  MENDOZA well  Sitting up  Incision clean and dry  Voiding on own  OK to discharge home after PT works with him  F/u in 10 days

## 2018-05-22 NOTE — PERIOP NOTES
TRANSFER - IN REPORT:    Verbal report received from [facility] RN(name) on Rupa Pittman  being received from 3218(unit) for routine progression of care      Report consisted of patients Situation, Background, Assessment and   Recommendations(SBAR). Information from the following report(s) OR Summary, Intake/Output and MAR was reviewed with the receiving nurse. Opportunity for questions and clarification was provided. Assessment completed upon patients arrival to unit and care assumed.

## 2018-05-22 NOTE — PROGRESS NOTES
Problem: Mobility Impaired (Adult and Pediatric)  Goal: *Acute Goals and Plan of Care (Insert Text)  Physical Therapy Goals  Initiated 5/22/2018    1. Patient will move from supine to sit and sit to supine  in bed with modified independence within 4 days. 2. Patient will perform sit to stand with modified independence within 4 days. 3. Patient will ambulate with modified independence for 500 feet with the least restrictive device within 4 days. 4. Patient will ascend/descend 12 stairs with single handrail(s) with modified independence within 4 days. 5. Patient will verbalize and demonstrate understanding of spinal precautions (No bending, lifting greater than 5 lbs, or twisting; log-roll technique; frequent repositioning as instructed) within 4 days. physical Therapy EVALUATION  Patient: Kenji Valerio (35 y.o. male)  Date: 5/22/2018  Primary Diagnosis: Intractable low back pain  Herniated nucleus pulposus  HERNIATED LEFT LUMBAR DISC  Procedure(s) (LRB):  SPINE LUMBAR LAMINECTOMY AND DISCECTOMY L4-5 LEFT (Left) 1 Day Post-Op   Precautions:   Fall, Back    ASSESSMENT :  Based on the objective data described below, the patient presents s/p lumbar laminectomy and discectomy Left L4-5. Patient evaluated POD #1. Patient received seated in chair and agreeable to therapy. Prior to admission, pt was independent, ambulatory without AD, active, lives with spouse in a 2 story home, employed as a physician at [employer]. Patient tolerated evaluation well. Patient reported he started having back pain radiating to LLE on Wednesday after lifting weights and arrived to the ED on Friday with intractable pain and difficulty walking and standing. At present time, pt reported minimal pain at incision and no LLE symptoms. Patient completed sit<>stand without AD with supervision, ambulated to and from the rehab gym with standby assist. Patient completed stair training with standby assist with verbal cues for proper sequencing. Patient demonstrated good technique and no LOB. Patient performed supine<>sit with standby assist and verbal cueing for log roll technique. Patient verbalized understanding of activity recommendations at home. Patient is cleared from a PT standpoint to d/c home with no further PT needs. If remains in hospital, will follow up tomorrow for treatment. .    Patient will benefit from skilled intervention to address the above impairments. Patients rehabilitation potential is considered to be Excellent  Factors which may influence rehabilitation potential include:   [x]         None noted  []         Mental ability/status  []         Medical condition  []         Home/family situation and support systems  []         Safety awareness  []         Pain tolerance/management  []         Other:      PLAN :  Recommendations and Planned Interventions:  [x]           Bed Mobility Training             [x]    Neuromuscular Re-Education  [x]           Transfer Training                   []    Orthotic/Prosthetic Training  [x]           Gait Training                         []    Modalities  [x]           Therapeutic Exercises           []    Edema Management/Control  [x]           Therapeutic Activities            [x]    Patient and Family Training/Education  []           Other (comment):    Frequency/Duration: Patient will be followed by physical therapy  twice daily to address goals. Discharge Recommendations: None  Further Equipment Recommendations for Discharge: none     SUBJECTIVE:   Patient stated It feels much better than it did before surgery. I could hardly walk.     OBJECTIVE DATA SUMMARY:   HISTORY:    Past Medical History:   Diagnosis Date    CAD (coronary artery disease)     Hyperlipidemia      Past Surgical History:   Procedure Laterality Date    HX ANGIOPLASTY      HX APPENDECTOMY      HX HERNIA REPAIR      HX TONSILLECTOMY      HX WISDOM TEETH EXTRACTION       Prior Level of Function/Home Situation: independent, ambulatory, active, drives, employed as a physician, lives in a 2 story home with spouse  Personal factors and/or comorbidities impacting plan of care:     Home Situation  Home Environment: Private residence  # Steps to Enter: 3  Rails to Enter: Yes  One/Two Story Residence: Two story  # of Interior Steps: 12  Height of Each Step (in): 8 inches  Interior Rails: Right  Lift Chair Available: No  Living Alone: No  Support Systems: Spouse/Significant Other/Partner  Patient Expects to be Discharged to[de-identified] Private residence  Current DME Used/Available at Home: None    EXAMINATION/PRESENTATION/DECISION MAKING:   Critical Behavior:  Neurologic State: Alert  Orientation Level: Oriented X4  Cognition: Appropriate for age attention/concentration     Hearing: Auditory  Auditory Impairment: None  Hearing Aids/Status: Does not own  Skin:    Edema:   Range Of Motion:  AROM: Within functional limits           PROM: Within functional limits           Strength:    Strength:  Within functional limits                    Tone & Sensation:                  Sensation: Intact               Coordination:     Vision:      Functional Mobility:  Bed Mobility:  Rolling: Supervision  Supine to Sit: Supervision  Sit to Supine: Supervision  Scooting: Supervision  Transfers:  Sit to Stand: Supervision  Stand to Sit: Supervision                       Balance:   Sitting: Intact  Standing: Intact  Ambulation/Gait Training:  Distance (ft): 350 Feet (ft)  Assistive Device: Gait belt  Ambulation - Level of Assistance: Stand-by assistance;Supervision                 Base of Support: Narrowed     Speed/Britt: Pace decreased (<100 feet/min)  Step Length: Right shortened;Left shortened                     Stairs:  Number of Stairs Trained: 8  Stairs - Level of Assistance: Stand-by assistance   Rail Use: Right     Therapeutic Exercises:       Functional Measure:  Tinetti test:    Sitting Balance: 1  Arises: 1  Attempts to Rise: 2  Immediate Standing Balance: 2  Standing Balance: 2  Nudged: 2  Eyes Closed: 1  Turn 360 Degrees - Continuous/Discontinuous: 1  Turn 360 Degrees - Steady/Unsteady: 1  Sitting Down: 2  Balance Score: 15  Indication of Gait: 1  R Step Length/Height: 1  L Step Length/Height: 1  R Foot Clearance: 1  L Foot Clearance: 1  Step Symmetry: 1  Step Continuity: 1  Path: 1  Trunk: 2  Walking Time: 1  Gait Score: 11  Total Score: 26       Tinetti Test and G-code impairment scale:  Percentage of Impairment CH    0%   CI    1-19% CJ    20-39% CK    40-59% CL    60-79% CM    80-99% CN     100%   Tinetti  Score 0-28 28 23-27 17-22 12-16 6-11 1-5 0       Tinetti Tool Score Risk of Falls  <19 = High Fall Risk  19-24 = Moderate Fall Risk  25-28 = Low Fall Risk  Tinetti ME. Performance-Oriented Assessment of Mobility Problems in Elderly Patients. [facility] 66; [id number]. (Scoring Description: PT Bulletin Feb. 10, 1993)    Older adults: Julienne Gongora et al, 2009; n = 1000 [facility] elderly evaluated with ABC, TIMMY, ADL, and IADL)  · Mean TIMMY score for males aged 69-68 years = 26.21(3.40)  · Mean TIMMY score for females age 69-68 years = 25.16(4.30)  · Mean TIMMY score for males over 80 years = 23.29(6.02)  · Mean TIMMY score for females over 80 years = 17.20(8.32)         G codes: In compliance with CMSs Claims Based Outcome Reporting, the following G-code set was chosen for this patient based on their primary functional limitation being treated: The outcome measure chosen to determine the severity of the functional limitation was the Tinetti with a score of 26/28 which was correlated with the impairment scale.     ? Mobility - Walking and Moving Around:     - CURRENT STATUS: CI - 1%-19% impaired, limited or restricted    - GOAL STATUS: CH - 0% impaired, limited or restricted    - D/C STATUS:  ---------------To be determined---------------       Based on the above components, the patient evaluation is determined to be of the following complexity level: LOW     Pain:  Pain Scale 1: Numeric (0 - 10)  Pain Intensity 1: 0              Activity Tolerance:   Good. VSS  Please refer to the flowsheet for vital signs taken during this treatment. After treatment:   [x]         Patient left in no apparent distress sitting up in chair  []         Patient left in no apparent distress in bed  [x]         Call bell left within reach  [x]         Nursing notified  []         Caregiver present  []         Bed alarm activated    COMMUNICATION/EDUCATION:   The patients plan of care was discussed with: Registered Nurse and Ortho NP. [x]         Fall prevention education was provided and the patient/caregiver indicated understanding. [x]         Patient/family have participated as able in goal setting and plan of care. [x]         Patient/family agree to work toward stated goals and plan of care. []         Patient understands intent and goals of therapy, but is neutral about his/her participation. []         Patient is unable to participate in goal setting and plan of care.     Thank you for this referral.  Abril Leonard, PT, DPT   Time Calculation: 17 mins

## 2018-05-22 NOTE — DISCHARGE SUMMARY
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 The pain became so excruciating, and despite the use of a Medrol Dosepak and pain relievers, he came to the emergency room because of an inability to walk. Imaging studies included an MRI at the time which showed a mild mm slip of L4 on 5, but more importantly multiple fragment disk herniation that had migrated cephalad and was impinging upon the L4 root. On examination on admission, he had severe right leg pain to the point that it was intractable to IV narcotic pain relievers and intravenous steroids. He had limited range of motion of the lumbar spine and subjective weakness in the left lower extremity in multiple nerve root distributions. He was admitted for pain control, and after discussing risks, benefits and alternatives of surgery, because there was no improvement with medical management, he elected to proceed with surgery. After reviewing the films and getting several opinions, I recommended a diskectomy at L4-5 on the left side, and at surgery, it was noted that there were multiple free fragments of disk herniation that had migrated cephalad and were impinging upon both the L4 and the L5 roots on the left side. On the day following surgery, the patient had immediate relief of his leg pain. He was ambulatory, voiding on his own. Incision was clean and dry. He had good strength in the lower extremities. He was discharged with instructions to follow up with me in my office in about 1-2 weeks, and given a prescription for incisional pain.     DISPOSITION:  Home with office follow up      MD MALLY Moya/SHRADDHA  D: 05/22/2018 09:00     T: 05/22/2018 09:51  JOB #: [id number]  CC: Ramiro Mane MD  CC: Jad Rodríguez MD

## 2018-05-22 NOTE — PROGRESS NOTES
Reviewed discharge instructions, prescriptions, and side effects with patient. Reviewed wound care, follow-up instructions, and medication instructions. Answered all questions and provided contact information for future questions. Took IV out per policy, Catheter tip intact. Going home in a car with family.

## 2018-05-22 NOTE — OP NOTES
[address]  OPERATIVE REPORT    Loly Adams  MR#: [id number]  : 1955  ACCOUNT #: [de-identified]   DATE OF SERVICE: 2018    PROCEDURE PERFORMED:  Lumbar laminectomy, excision L4-L5 lateral disk herniation on the left. PREOPERATIVE DIAGNOSIS:  Lateral disk herniation on the left, L4-L5. POSTOPERATIVE DIAGNOSIS:  Lateral disk herniation on the left, L4-L5. SURGEON:  Victoria Sweet MD    COMPLICATIONS:  None. ESTIMATED BLOOD LOSS:  None. ANESTHESIA:  General.    SPECIMENS REMOVED:  Disk    IMPLANTS:  none     ASSISTANT:  Arthur Howell and Chuy Damian. OPERATIVE PROCEDURE:  Review of imaging studies revealed a free fragment disk herniation that migrated superiorly at L4-L5 on the left side in this patient with intractable left leg pain despite multiple efforts to relieve the pain with narcotic analgesics and steroids. There was noted to be a 5 mm or less slip of L4 on L5, but the patient's signs and symptoms were specifically radiculopathy related to the L4 nerve root compression. The disk was noted to be herniated superiorly and laterally as it exited the foramen, pushed up against the L4 root on the left side. After discussing risks, benefits and alternatives of surgery with him he agreed to proceed. He was taken to the operating room. He was induced under general endotracheal anesthesia, placed on the operating table in the prone position on chest rolls. The lumbar region shaved, scrubbed, prepped and draped in the usual sterile fashion. A blunt needle was placed over the L4-L5 interspace and x-ray obtained to confirm position prior to making the incision. The incision was then made centered over that area after confirming with x-ray. The muscles were taken down in a subperiosteal fashion on the left side only off of L3, 4, and 5, in order to visually identify the interspace level.   The facet joint at L4-L5 corresponding to the imaging studies was noted to be significantly hypertrophied. A towel clamp was applied to the spinous process of L4 and another x-ray obtained to confirm position. Under loupe magnification, the transverse process of L4 was exposed in preparation for the possibility of having to retrieve the disk from a more lateral position. A self-retaining retractor was inserted into the edges of the incision. Laminectomy was begun at L4 on the left side using combination of Kerrison and Leksell rongeurs and high speed drill in order to drill down the medial aspect of the facet in order to improve the exposure. A foraminotomy over the L5 root was performed, and as the laminectomy proceeded superiorly. The fragments of disk material came into view as they were both overlying and underlying the L4 root. The ligamentum flavum was removed on the left side over the dura. The L4 root was visualized and multiple fragments of disk material were then able to be retrieved from the outside of the disk space and lying underneath the L4 root. It seemed that every time another fragment of disk material was retrieved another fragment would appear, a significant amount of time was spent concentrating on making sure that that L4 root was indeed no longer under any pressure from any further fragments. Dissection was carried medially, and the disk space itself was incised with a #11 blade scalpel and multiple disk fragments were retrieved from both within and outside of the disk space centrally, corresponding to the imaging studies as it impinged upon the undersurface of the thecal sac. At the end of the procedure, there were no further fragments identified. The L4 and the L5 roots as well as the thecal sac at the L4-L5 interspace level appeared to be well decompressed and no longer under any compression.   Bleeding was easily controlled with bipolar electrocautery and FloSeal was placed over the laminectomy site after placing 40 mg of 1 mL of Depo-Medrol topically upon the surface of the nerve root. The nerve root and thecal sac all appeared to occupy more normal anatomical shape and position at the end of the procedure. The area was copiously irrigated. After closing the fascia with 0 Vicryl in an interrupted fashion, subcutaneous tissue was closed with 3-0 interrupted inverted Vicryl sutures and a running 4-0 Monocryl was used to close the subcuticular layer. Steri-Strips were applied to the skin edges. The needle, sponge and instrument count were correct at the end of the procedure.       MD MALLY Moya / Rochelle Jacques  D: 05/21/2018 19:40     T: 05/21/2018 20:30  JOB #: [id number]  CC: Ramiro Mane MD

## 2019-07-16 PROBLEM — E78.2 MIXED HYPERLIPIDEMIA: Status: ACTIVE | Noted: 2019-07-16

## 2019-07-16 PROBLEM — I25.10 ASCVD (ARTERIOSCLEROTIC CARDIOVASCULAR DISEASE): Status: ACTIVE | Noted: 2019-07-16

## 2019-07-16 PROBLEM — Z00.00 ANNUAL PHYSICAL EXAM: Status: ACTIVE | Noted: 2019-07-16

## 2019-07-16 NOTE — PROGRESS NOTES
HPI:   62 y.o.  presents for comprehensive annual healthcare examination and follow up appointment. No hospital, ER or specialist visits since last primary care visit except as noted below. He was last seen by me 9 years ago and that interim is had 2 episodes where he had acute coronary syndrome requiring stent placement with the last one being 2 years ago. He does have hyperlipidemia for which he is on medication for that currently with Zetia and Crestor at 40 mg and previously was on Repatha which she is no longer on. He does continue to take an aspirin. He has been off Plavix now that he has 2 years out from his last stent. He denies any chest pain, shortness of breath, palpitations, PND, orthopnea or other cardiorespiratory complaints. He denies any GI or  complaints. He denies any headaches, dizziness or neurologic complaints. There are no current arthritic complaints. He does have some fatigue but nothing to seem to be exertional related needs one if possible the testosterone level of vitamin D level might be low. He has no other complaints on complete review of systems. Patient Active Problem List    Diagnosis    Fatigue    Vitamin D deficiency    Annual physical exam    ASCVD (arteriosclerotic cardiovascular disease)    Mixed hyperlipidemia    Lumbar disc herniation     L 4-5 treated surgically May 2018      Intractable low back pain       Past Medical History:   Diagnosis Date    CAD (coronary artery disease)     Hyperlipidemia        Social History     Tobacco Use    Smoking status: Never Smoker    Smokeless tobacco: Never Used   Substance Use Topics    Alcohol use: Yes     Comment: social    Drug use: No       History reviewed. No pertinent family history. No Known Allergies    ROS:     Constitutional: He denies fevers, weight loss, sweats. Positive for some fatigue  Eyes: No blurred or double vision. ENT: No difficulty with swallowing, taste, speech or smell.   Neck: no stiffness or swelling  Respiratory: No cough wheezing or shortness of breath. Cardiovascular: Denies chest pain, palpitations, unexplained indigestion or syncope. Gastrointestinal:  No changes in bowel movements, no abdominal pain, no bloating. Genitourinary:  He denies frequency, nocturia or stranguria. Extremities: No joint pain, stiffness or swelling. Neurological:  No numbness, tingling, burring paresthesias or loss of motor strength. No syncope, dizziness or frequent headache  Lymphatic: no adenopathy noted  Hematologic: no easy bruising or bleeding gums  Skin:  No recent rashes or mole changes. Psychiatric/Behavioral:  Negative for depression. The patient is not nervous/anxious. PE:     Vitals:    07/17/19 0753   BP: 100/62   Pulse: 66   Resp: 18   Temp: 97.7 °F (36.5 °C)   TempSrc: Oral   SpO2: 97%   Weight: 155 lb 8 oz (70.5 kg)   Height: 5' 7\" (1.702 m)   PainSc:   0 - No pain        General appearance - alert, well appearing, and in no distress  Mental status - alert, oriented to person, place, and time  HEENT:  Ears - bilateral TM's and external ear canals clear  Eyes - pupillary responses were normal.  Extraocular muscle function intact. Lids and conjunctiva not injected. Fundoscopic exam revealed sharp disc margins. eye movements intact  Pharynx- clear with teeth in good repair. No masses were noted  Neck - supple without thyromegaly or burit. No JVD noted  Lungs - clear to auscultation and percussion  Cardiac- normal rate, regular rhythm without murmurs. PMI not displaced. No gallop, rub or click  Abdomen - flat, soft, non-tender without palpable organomegaly or mass. No pulsatile mass was felt, and not bruit was heard.   Bowel sounds were active  : Circumcised, Testes descended w/o masses  Rectal: normal sphincter tone, prostate 1+ enlarged smooth and nontender without nodules, no masses, stool brown and hemacult negative  Extremities -  no clubbing cyanosis or edema  Lymphatics - no palpable lymphadenopathy, no hepatosplenomegaly  Hematologic: no petechiae or purpura  Peripheral vascular -Femoral, Dorsalis pedis and posterior tibial pulses felt without difficulty  Skin - no rash or unusual mole change noted  Neurological - Cranial nerves II-XII grossly intact. Motor strength 5/5. DTR's 2+ and symmetric. Station and gait normal  Back exam - full range of motion, no tenderness, palpable spasm or pain on motion  Musculoskeletal - no joint tenderness, deformity or swelling      Assessment/Plan:     1. Annual physical exam    2. ASCVD (arteriosclerotic cardiovascular disease)    3. Lumbar disc herniation    4. Mixed hyperlipidemia    5. Vitamin D deficiency    6. Fatigue, unspecified type      Pression  1. Annual physical examination appears to be normal  2. ASCVD clinically stable on aspirin daily  3. Hyperlipidemia currently on Zetia and Crestor off Repatha and will see what the status is  4. Status post lumbar disc herniation treated surgically a little over a year ago  5. Fatigue we will check a testosterone level  6. Vitamin D deficiency status is pending  I will call with lab results and make further recommendations or adjustments if necessary. Follow-up as scheduled on a yearly basis or sooner if there is a problem. He has not had a screening colonoscopy and I strongly encourage this and he says he was set it up himself with Dr. Jacky Grayson. I will make further adjustments if necessary based upon lab studies. Health Maintenance reviewed - updated.     Orders Placed This Encounter    CBC WITH AUTOMATED DIFF    METABOLIC PANEL, COMPREHENSIVE    LIPID PANEL    CK    T4, FREE    TSH 3RD GENERATION    URINALYSIS W/ RFLX MICROSCOPIC    PROSTATE SPECIFIC AG    HEMOGLOBIN A1C WITH EAG    TESTOSTERONE, FREE & TOTAL    VITAMIN D, 25 HYDROXY ([facility])    HEPATITIS C AB       Medications Discontinued During This Encounter   Medication Reason    oxyCODONE IR (ROXICODONE) 5 mg immediate release tablet Alternate Therapy    senna-docusate (SENNA PLUS) 8.6-50 mg per tablet Alternate Therapy    clopidogrel (PLAVIX) 75 mg tab Alternate Therapy    evolocumab (REPATHA SYRINGE) syringe Alternate Therapy    omega 3-dha-epa-fish oil (FISH OIL) 100-160-1,000 mg cap Alternate Therapy       Current Outpatient Medications   Medication Sig Dispense Refill    aspirin 81 mg chewable tablet Take 1 Tab by mouth daily. May resume in two days.  ibuprofen (MOTRIN) 200 mg tablet Take 2 Tabs by mouth every eight (8) hours as needed for Pain. Hold for two weeks.  rosuvastatin (CRESTOR) 40 mg tablet Take 40 mg by mouth daily.  ezetimibe (ZETIA) 10 mg tablet Take 10 mg by mouth daily.  multivitamin (ONE A DAY) tablet Take 1 Tab by mouth daily. No results found for any visits on 07/17/19. Recommended healthy diet low in carbohydrates, fats, sodium and cholesterol. Recommended regular cardiovascular exercise 3-6 times per week for 30-60 minutes daily. Verbal and written instructions (see AVS) provided. Patient expresses understanding of diagnosis and treatment plan.       Daren Dixon MD

## 2019-07-17 ENCOUNTER — OFFICE VISIT (OUTPATIENT)
Dept: INTERNAL MEDICINE CLINIC | Age: 64
End: 2019-07-17

## 2019-07-17 VITALS
HEIGHT: 67 IN | OXYGEN SATURATION: 97 % | DIASTOLIC BLOOD PRESSURE: 62 MMHG | WEIGHT: 155.5 LBS | RESPIRATION RATE: 18 BRPM | SYSTOLIC BLOOD PRESSURE: 100 MMHG | BODY MASS INDEX: 24.4 KG/M2 | HEART RATE: 66 BPM | TEMPERATURE: 97.7 F

## 2019-07-17 DIAGNOSIS — I25.10 ASCVD (ARTERIOSCLEROTIC CARDIOVASCULAR DISEASE): ICD-10-CM

## 2019-07-17 DIAGNOSIS — E55.9 VITAMIN D DEFICIENCY: ICD-10-CM

## 2019-07-17 DIAGNOSIS — R53.83 FATIGUE, UNSPECIFIED TYPE: ICD-10-CM

## 2019-07-17 DIAGNOSIS — Z00.00 ANNUAL PHYSICAL EXAM: Primary | ICD-10-CM

## 2019-07-17 DIAGNOSIS — M51.26 LUMBAR DISC HERNIATION: ICD-10-CM

## 2019-07-17 DIAGNOSIS — E78.2 MIXED HYPERLIPIDEMIA: ICD-10-CM

## 2019-07-17 NOTE — PATIENT INSTRUCTIONS
Learning About Coronary Artery Disease (CAD)  What is coronary artery disease? Coronary artery disease (CAD) occurs when plaque builds up in the arteries that bring oxygen-rich blood to your heart. Plaque is a fatty substance made of cholesterol, calcium, and other substances in the blood. This process is called hardening of the arteries, or atherosclerosis. What happens when you have coronary artery disease? · Plaque may narrow the coronary arteries. Narrowed arteries cause poor blood flow. This can lead to angina symptoms such as chest pain or discomfort. If blood flow is completely blocked, you could have a heart attack. · You can slow CAD and reduce the risk of future problems by making changes in your lifestyle. These include quitting smoking and eating heart-healthy foods. · Treatments for CAD, along with changes in your lifestyle, can help you live a longer and healthier life. How can you prevent coronary artery disease? · Do not smoke. It may be the best thing you can do to prevent heart disease. If you need help quitting, talk to your doctor about stop-smoking programs and medicines. These can increase your chances of quitting for good. · Be active. Get at least 30 minutes of exercise on most days of the week. Walking is a good choice. You also may want to do other activities, such as running, swimming, cycling, or playing tennis or team sports. · Eat heart-healthy foods. Eat more fruits and vegetables and less foods that contain saturated and trans fats. Limit alcohol, sodium, and sweets. · Stay at a healthy weight. Lose weight if you need to. · Manage other health problems such as diabetes, high blood pressure, and high cholesterol. · Manage stress. Stress can hurt your heart. To keep stress low, talk about your problems and feelings. Don't keep your feelings hidden. · If you have talked about it with your doctor, take a low-dose aspirin every day.  Aspirin can help certain people lower their risk of a heart attack or stroke. But taking aspirin isn't right for everyone, because it can cause serious bleeding. Do not start taking daily aspirin unless your doctor knows about it. How is coronary artery disease treated? · Your doctor will suggest that you make lifestyle changes. For example, your doctor may ask you to eat healthy foods, quit smoking, lose extra weight, and be more active. · You will have to take medicines. · Your doctor may suggest a procedure to open narrowed or blocked arteries. This is called angioplasty. Or your doctor may suggest using healthy blood vessels to create detours around narrowed or blocked arteries. This is called bypass surgery. Follow-up care is a key part of your treatment and safety. Be sure to make and go to all appointments, and call your doctor if you are having problems. It's also a good idea to know your test results and keep a list of the medicines you take. Where can you learn more? Go to http://roseann-todd.info/. Enter (47) [phone] in the search box to learn more about \"Learning About Coronary Artery Disease (CAD). \"  Current as of: July 22, 2018  Content Version: 11.9  © 2173-6507 [employer]. Care instructions adapted under license by [employer] (which disclaims liability or warranty for this information). If you have questions about a medical condition or this instruction, always ask your healthcare professional. [place] [address] any warranty or liability for your use of this information.

## 2019-07-17 NOTE — PROGRESS NOTES
Conchita Buerger  Identified pt with two pt identifiers(name and ). Chief Complaint   Patient presents with   [facility] Annual Wellness Visit       1. Have you been to the ER, urgent care clinic since your last visit? Hospitalized since your last visit? Cardiac Stent on 17 also disctectomy on 18. 2. Have you seen or consulted any other health care providers outside of the [address] [place] [address] since your last visit? Include any pap smears or colon screening. No      Health Maintenance Topics with due status: Overdue       Topic Date Due    Hepatitis C Screening 1955    COLONOSCOPY 1974    DTaP/Tdap/Td series 1977    Shingrix Vaccine Age 50> 2006     Health Maintenance Topics with due status: Not Due       Topic Last Completion Date    Influenza Age 5 to Adult Not Due           Medication reconciliation up to date and corrected with patient at this time. Today's provider has been notified of reason for visit, vitals and flowsheets obtained on patients. Reviewed record in preparation for visit, huddled with provider and have obtained necessary documentation. Wt Readings from Last 3 Encounters:   19 155 lb 8 oz (70.5 kg)   18 145 lb (65.8 kg)     Temp Readings from Last 3 Encounters:   19 97.7 °F (36.5 °C) (Oral)   18 97.6 °F (36.4 °C)     BP Readings from Last 3 Encounters:   19 100/62   18 133/83     Pulse Readings from Last 3 Encounters:   19 66   18 (!) 58     Vitals:    19 0753   BP: 100/62   Pulse: 66   Resp: 18   Temp: 97.7 °F (36.5 °C)   TempSrc: Oral   SpO2: 97%   Weight: 155 lb 8 oz (70.5 kg)   Height: 5' 7\" (1.702 m)   PainSc:   0 - No pain         Learning Assessment:  :     No flowsheet data found.     Depression Screening:  :     3 most recent PHQ Screens 2019   Little interest or pleasure in doing things Not at all   Feeling down, depressed, irritable, or hopeless Not at all   Total Score PHQ 2 0       No flowsheet data found. Fall Risk Assessment:  :     No flowsheet data found. Abuse Screening:  :     Abuse Screening Questionnaire 7/17/2019   Do you ever feel afraid of your partner? N   Are you in a relationship with someone who physically or mentally threatens you? N   Is it safe for you to go home? Y       ADL Screening:  :     No flowsheet data found.

## 2019-07-19 DIAGNOSIS — R79.89 LOW TESTOSTERONE IN MALE: Primary | ICD-10-CM

## 2019-07-19 LAB
25(OH)D3+25(OH)D2 SERPL-MCNC: 30.9 NG/ML (ref 30–100)
ALBUMIN SERPL-MCNC: 4.6 G/DL (ref 3.6–4.8)
ALBUMIN/GLOB SERPL: 1.7 {RATIO} (ref 1.2–2.2)
ALP SERPL-CCNC: 52 IU/L (ref 39–117)
ALT SERPL-CCNC: 31 IU/L (ref 0–44)
APPEARANCE UR: CLEAR
AST SERPL-CCNC: 25 IU/L (ref 0–40)
BASOPHILS # BLD AUTO: 0.1 X10E3/UL (ref 0–0.2)
BASOPHILS NFR BLD AUTO: 1 %
BILIRUB SERPL-MCNC: 0.9 MG/DL (ref 0–1.2)
BILIRUB UR QL STRIP: NEGATIVE
BUN SERPL-MCNC: 23 MG/DL (ref 8–27)
BUN/CREAT SERPL: 22 (ref 10–24)
CALCIUM SERPL-MCNC: 9.8 MG/DL (ref 8.6–10.2)
CHLORIDE SERPL-SCNC: 99 MMOL/L (ref 96–106)
CHOLEST SERPL-MCNC: 138 MG/DL (ref 100–199)
CK SERPL-CCNC: 172 U/L (ref 24–204)
CO2 SERPL-SCNC: 26 MMOL/L (ref 20–29)
COLOR UR: YELLOW
CREAT SERPL-MCNC: 1.03 MG/DL (ref 0.76–1.27)
EOSINOPHIL # BLD AUTO: 0.1 X10E3/UL (ref 0–0.4)
EOSINOPHIL NFR BLD AUTO: 2 %
ERYTHROCYTE [DISTWIDTH] IN BLOOD BY AUTOMATED COUNT: 12.1 % (ref 12.3–15.4)
EST. AVERAGE GLUCOSE BLD GHB EST-MCNC: 123 MG/DL
GLOBULIN SER CALC-MCNC: 2.7 G/DL (ref 1.5–4.5)
GLUCOSE SERPL-MCNC: 105 MG/DL (ref 65–99)
GLUCOSE UR QL: NEGATIVE
HBA1C MFR BLD: 5.9 % (ref 4.8–5.6)
HCT VFR BLD AUTO: 47 % (ref 37.5–51)
HCV AB S/CO SERPL IA: <0.1 S/CO RATIO (ref 0–0.9)
HDLC SERPL-MCNC: 60 MG/DL
HGB BLD-MCNC: 15.6 G/DL (ref 13–17.7)
HGB UR QL STRIP: NEGATIVE
IMM GRANULOCYTES # BLD AUTO: 0 X10E3/UL (ref 0–0.1)
IMM GRANULOCYTES NFR BLD AUTO: 0 %
KETONES UR QL STRIP: NEGATIVE
LDLC SERPL CALC-MCNC: 68 MG/DL (ref 0–99)
LEUKOCYTE ESTERASE UR QL STRIP: NEGATIVE
LYMPHOCYTES # BLD AUTO: 1.7 X10E3/UL (ref 0.7–3.1)
LYMPHOCYTES NFR BLD AUTO: 30 %
MCH RBC QN AUTO: 30.8 PG (ref 26.6–33)
MCHC RBC AUTO-ENTMCNC: 33.2 G/DL (ref 31.5–35.7)
MCV RBC AUTO: 93 FL (ref 79–97)
MICRO URNS: NORMAL
MONOCYTES # BLD AUTO: 0.7 X10E3/UL (ref 0.1–0.9)
MONOCYTES NFR BLD AUTO: 12 %
NEUTROPHILS # BLD AUTO: 3 X10E3/UL (ref 1.4–7)
NEUTROPHILS NFR BLD AUTO: 55 %
NITRITE UR QL STRIP: NEGATIVE
PH UR STRIP: 7.5 [PH] (ref 5–7.5)
PLATELET # BLD AUTO: 275 X10E3/UL (ref 150–450)
POTASSIUM SERPL-SCNC: 4.4 MMOL/L (ref 3.5–5.2)
PROT SERPL-MCNC: 7.3 G/DL (ref 6–8.5)
PROT UR QL STRIP: NEGATIVE
PSA SERPL-MCNC: 3.8 NG/ML (ref 0–4)
RBC # BLD AUTO: 5.07 X10E6/UL (ref 4.14–5.8)
SODIUM SERPL-SCNC: 139 MMOL/L (ref 134–144)
SP GR UR: 1.02 (ref 1–1.03)
T4 FREE SERPL-MCNC: 1.33 NG/DL (ref 0.82–1.77)
TESTOST FREE SERPL-MCNC: 5.4 PG/ML (ref 6.6–18.1)
TESTOST FREE SERPL-MCNC: 5.6 PG/ML (ref 6.6–18.1)
TESTOST SERPL-MCNC: 367 NG/DL (ref 264–916)
TESTOST SERPL-MCNC: 424 NG/DL (ref 264–916)
TRIGL SERPL-MCNC: 48 MG/DL (ref 0–149)
TSH SERPL DL<=0.005 MIU/L-ACNC: 1.57 UIU/ML (ref 0.45–4.5)
UROBILINOGEN UR STRIP-MCNC: 0.2 MG/DL (ref 0.2–1)
VLDLC SERPL CALC-MCNC: 10 MG/DL (ref 5–40)
WBC # BLD AUTO: 5.5 X10E3/UL (ref 3.4–10.8)

## 2019-07-19 NOTE — PROGRESS NOTES
Lipid profile looks good. The blood sugar was slightly elevated at 105 so glycohemoglobin was done which is slightly elevated in the prediabetic range. Watch diet regarding this. Free testosterone level is slightly low. And PSA at 3.8 is in the upper normal range. With a free testosterone level being low can make urology referral if he wants to consider treatment. All the lab looks okay. With the blood sugar and glycohemoglobin being a little high I would recommend repeating the blood sugar and glycohemoglobin in about 3 months. Discussed lab with patient. Referral generated to Dr. Priya Sheehan at [facility].

## 2019-07-19 NOTE — PROGRESS NOTES
Lipid profile looks good. The blood sugar was slightly elevated at 105 so glycohemoglobin was done which is slightly elevated in the prediabetic range. Watch diet regarding this. Free testosterone level is slightly low. And PSA at 3.8 is in the upper normal range. With a free testosterone level being low can make urology referral if he wants to consider treatment. All the lab looks okay. With the blood sugar and glycohemoglobin being a little high I would recommend repeating the blood sugar and glycohemoglobin in about 3 months.

## 2019-09-19 PROBLEM — Z00.00 ANNUAL PHYSICAL EXAM: Status: RESOLVED | Noted: 2019-07-16 | Resolved: 2019-09-19

## 2020-08-18 PROBLEM — E74.39 GLUCOSE INTOLERANCE: Status: ACTIVE | Noted: 2020-08-18

## 2020-08-18 NOTE — PROGRESS NOTES
HPI:   60 y.o.  presents for comprehensive annual healthcare examination and follow up appointment. No hospital, ER or specialist visits since last primary care visit except as noted below. In follow-up of his medical problems include lipidemia, ASCVD status post stent placement x2 with the last 3 years ago, glucose intolerance with his A1c last year of 5.8, lumbar disc herniation status post surgery, vitamin D deficiency and other medical problems. He currently denies any chest pain, shortness of breath, palpitations, PND, orthopnea or other cardiorespiratory complaints. He notes no GI or  complaints except nocturia couple times per night plus he does note a little hesitancy but he thinks is fluid induced. [employer] He notes no headaches, dizziness or neurologic complaints. There are no current active arthritic complaints and no other complaints on complete review systems. Patient Active Problem List    Diagnosis    Benign prostatic hyperplasia with nocturia    Glucose intolerance     July 2019 fasting blood sugar 105 and hemoglobin A1c of 5.9      Fatigue    Vitamin D deficiency    Annual physical exam    ASCVD (arteriosclerotic cardiovascular disease)    Mixed hyperlipidemia    Lumbar disc herniation     L 4-5 treated surgically May 2018      Intractable low back pain       Past Medical History:   Diagnosis Date    CAD (coronary artery disease)     Hyperlipidemia        Social History     Tobacco Use    Smoking status: Never Smoker    Smokeless tobacco: Never Used   Substance Use Topics    Alcohol use: Yes     Comment: social    Drug use: No       History reviewed. No pertinent family history. No Known Allergies    ROS:     Constitutional: He denies fevers, weight loss, sweats. Eyes: No blurred or double vision. ENT: No difficulty with swallowing, taste, speech or smell. Neck: no stiffness or swelling  Respiratory: No cough wheezing or shortness of breath.   Cardiovascular: Denies chest pain, palpitations, unexplained indigestion or syncope. Gastrointestinal:  No changes in bowel movements, no abdominal pain, no bloating. Genitourinary:  He denies stranguria. Positive for nocturia as well as some urinary frequency in the daytime  Extremities: No joint pain, stiffness or swelling. Neurological:  No numbness, tingling, burring paresthesias or loss of motor strength. No syncope, dizziness or frequent headache  Lymphatic: no adenopathy noted  Hematologic: no easy bruising or bleeding gums  Skin:  No recent rashes or mole changes. Psychiatric/Behavioral:  Negative for depression. The patient is not nervous/anxious. PE:     Vitals:    08/19/20 0925   BP: 118/72   Pulse: 64   Resp: 17   Temp: 97.2 °F (36.2 °C)   TempSrc: Oral   SpO2: 98%   Weight: 154 lb (69.9 kg)   Height: 5' 7\" (1.702 m)   PainSc:   0 - No pain        General appearance - alert, well appearing, and in no distress  Mental status - alert, oriented to person, place, and time  HEENT:  Ears - bilateral TM's and external ear canals clear  Eyes - pupillary responses were normal.  Extraocular muscle function intact. Lids and conjunctiva not injected. Fundoscopic exam revealed sharp disc margins. eye movements intact  Pharynx- clear with teeth in good repair. No masses were noted  Neck - supple without thyromegaly or burit. No JVD noted  Lungs - clear to auscultation and percussion  Cardiac- normal rate, regular rhythm without murmurs. PMI not displaced. No gallop, rub or click  Abdomen - flat, soft, non-tender without palpable organomegaly or mass. No pulsatile mass was felt, and not bruit was heard.   Bowel sounds were active  : Circumcised, Testes descended w/o masses  Rectal: normal sphincter tone, prostate 1+ enlarged smooth and nontender without nodules, no masses, stool brown and hemacult negative  Extremities -  no clubbing cyanosis or edema  Lymphatics - no palpable lymphadenopathy, no hepatosplenomegaly  Hematologic: no petechiae or purpura  Peripheral vascular -Femoral, Dorsalis pedis and posterior tibial pulses felt without difficulty  Skin - no rash or unusual mole change noted  Neurological - Cranial nerves II-XII grossly intact. Motor strength 5/5. DTR's 2+ and symmetric. Station and gait normal  Back exam - full range of motion, no tenderness, palpable spasm or pain on motion  Musculoskeletal - no joint tenderness, deformity or swelling      Assessment/Plan:     1. Annual physical exam    2. Mixed hyperlipidemia    3. ASCVD (arteriosclerotic cardiovascular disease)    4. Vitamin D deficiency    5. Glucose intolerance    6. Benign prostatic hyperplasia with nocturia      Impression  1. Annual physical examination is normal except he does have some BPH symptoms  2. Hyperlipidemia we will see what that status is and is currently on Crestor as well as Zetia  3. ASCVD clinically stable EKG obtained today normal sinus to cardia with incomplete right bundle branch block. That is unchanged from prior tracing. 4.  Vitamin D deficiency repeat status pending  5. Glucose intolerance we will see what his A1c looks like today as his last 1 was 5.8  6. BPH symptomatic we will try Flomax 0.4 daily  I will call with lab results and make further recommendations or adjustments if necessary. I again encouraged him to get his colonoscopy which he has not yet done he assures me he will get this done. I will see him yearly provided all stable with the lab and he has no further symptoms. Health Maintenance reviewed - updated. Orders Placed This Encounter    CBC WITH AUTOMATED DIFF    METABOLIC PANEL, COMPREHENSIVE    LIPID PANEL    CK    T4, FREE    TSH 3RD GENERATION    URINALYSIS W/ RFLX MICROSCOPIC    PROSTATE SPECIFIC AG    VITAMIN D, 25 HYDROXY    HEMOGLOBIN A1C WITH EAG    AMB POC EKG ROUTINE W/ 12 LEADS, INTER & REP    tamsulosin (Flomax) 0.4 mg capsule       There are no discontinued medications.     Current Outpatient Medications   Medication Sig Dispense Refill    tamsulosin (Flomax) 0.4 mg capsule Take 1 Cap by mouth daily. 30 Cap prn    aspirin 81 mg chewable tablet Take 1 Tab by mouth daily. May resume in two days.  ibuprofen (MOTRIN) 200 mg tablet Take 2 Tabs by mouth every eight (8) hours as needed for Pain. Hold for two weeks.  rosuvastatin (CRESTOR) 40 mg tablet Take 40 mg by mouth daily.  ezetimibe (ZETIA) 10 mg tablet Take 10 mg by mouth daily.  multivitamin (ONE A DAY) tablet Take 1 Tab by mouth daily. No results found for any visits on 08/19/20. Recommended healthy diet low in carbohydrates, fats, sodium and cholesterol. Recommended regular cardiovascular exercise 3-6 times per week for 30-60 minutes daily. Verbal and written instructions (see AVS) provided. Patient expresses understanding of diagnosis and treatment plan.       Elva Milian MD

## 2020-08-19 ENCOUNTER — OFFICE VISIT (OUTPATIENT)
Dept: INTERNAL MEDICINE CLINIC | Age: 65
End: 2020-08-19
Payer: COMMERCIAL

## 2020-08-19 VITALS
HEART RATE: 64 BPM | WEIGHT: 154 LBS | TEMPERATURE: 97.2 F | OXYGEN SATURATION: 98 % | SYSTOLIC BLOOD PRESSURE: 118 MMHG | DIASTOLIC BLOOD PRESSURE: 72 MMHG | BODY MASS INDEX: 24.17 KG/M2 | HEIGHT: 67 IN | RESPIRATION RATE: 17 BRPM

## 2020-08-19 DIAGNOSIS — R35.1 BENIGN PROSTATIC HYPERPLASIA WITH NOCTURIA: ICD-10-CM

## 2020-08-19 DIAGNOSIS — E74.39 GLUCOSE INTOLERANCE: ICD-10-CM

## 2020-08-19 DIAGNOSIS — E78.2 MIXED HYPERLIPIDEMIA: ICD-10-CM

## 2020-08-19 DIAGNOSIS — I25.10 ASCVD (ARTERIOSCLEROTIC CARDIOVASCULAR DISEASE): ICD-10-CM

## 2020-08-19 DIAGNOSIS — N40.1 BENIGN PROSTATIC HYPERPLASIA WITH NOCTURIA: ICD-10-CM

## 2020-08-19 DIAGNOSIS — Z00.00 ANNUAL PHYSICAL EXAM: Primary | ICD-10-CM

## 2020-08-19 DIAGNOSIS — E55.9 VITAMIN D DEFICIENCY: ICD-10-CM

## 2020-08-19 PROCEDURE — 99396 PREV VISIT EST AGE 40-64: CPT | Performed by: INTERNAL MEDICINE

## 2020-08-19 PROCEDURE — 93000 ELECTROCARDIOGRAM COMPLETE: CPT | Performed by: INTERNAL MEDICINE

## 2020-08-19 RX ORDER — TAMSULOSIN HYDROCHLORIDE 0.4 MG/1
0.4 CAPSULE ORAL DAILY
Qty: 30 CAP | Status: SHIPPED | OUTPATIENT
Start: 2020-08-19

## 2020-08-19 NOTE — PROGRESS NOTES
Chief Complaint   Patient presents with    Complete Physical     Visit Vitals  /72 (BP 1 Location: Left arm, BP Patient Position: Sitting)   Pulse 64   Temp 97.2 °F (36.2 °C) (Oral)   Resp 17   Ht 5' 7\" (1.702 m)   Wt 154 lb (69.9 kg)   SpO2 98%   BMI 24.12 kg/m²     1. Have you been to the ER, urgent care clinic since your last visit? Hospitalized since your last visit? No    2. Have you seen or consulted any other health care providers outside of the [address] [place] [address] since your last visit? Include any pap smears or colon screening.  dermatology

## 2020-08-19 NOTE — PATIENT INSTRUCTIONS
Benign Prostatic Hyperplasia: Care Instructions Your Care Instructions Benign prostatic hyperplasia, or BPH, is an enlarged prostate gland. The prostate is a small gland that makes some of the fluid in semen. Prostate enlargement happens to almost all men as they age. It is usually not serious. BPH does not cause prostate cancer. As the prostate gets bigger, it may partly block the flow of urine. You may have a hard time getting a urine stream started or completely stopped. You may have a weak urine stream, or you may have to urinate more often than you used to, especially at night. Most men find these problems easy to manage. You do not need treatment unless your symptoms bother you a lot or you have other problems, such as bladder infections or stones. In these cases, medicines may help. Surgery is not needed unless the urine flow is blocked or the symptoms do not get better with medicine. Follow-up care is a key part of your treatment and safety. Be sure to make and go to all appointments, and call your doctor if you are having problems. It's also a good idea to know your test results and keep a list of the medicines you take. How can you care for yourself at home? · Urinate as much as you can, relax for a few moments, and then try to urinate again. · Sit on the toilet to urinate. · Avoid caffeine and alcohol. These drinks will increase how often you need to urinate. · Many over-the-counter cold and allergy medicines can make the symptoms of BPH worse. Avoid antihistamines, decongestants, and allergy pills, if you can. Read the warnings on the package. · If you take any prescription medicines such as muscle relaxants, pain medicines, or medicines for depression or anxiety, ask your doctor or pharmacist if they can cause urination problems. When should you call for help? Call your doctor now or seek immediate medical care if: 
· You cannot urinate at all. · You have symptoms of a urinary infection. For example: ? You have blood or pus in your urine. ? You have pain in your back just below your rib cage. This is called flank pain. ? You have a fever, chills, or body aches. ? It hurts to urinate. ? You have groin or belly pain. Watch closely for changes in your health, and be sure to contact your doctor if: 
· It hurts when you ejaculate. · Your urinary problems get a lot worse or bother you a lot. Where can you learn more? Go to http://[email or web address].info/ Enter W708 in the search box to learn more about \"Benign Prostatic Hyperplasia: Care Instructions. \" Current as of: February 11, 2020               Content Version: 12.5 © 5984-9657 [employer]. Care instructions adapted under license by "[employer] (which disclaims liability or warranty for this information). If you have questions about a medical condition or this instruction, always ask your healthcare professional. [address] any warranty or liability for your use of this information.

## 2020-08-20 LAB
25(OH)D3+25(OH)D2 SERPL-MCNC: 35.1 NG/ML (ref 30–100)
ALBUMIN SERPL-MCNC: 4.4 G/DL (ref 3.8–4.8)
ALBUMIN/GLOB SERPL: 1.7 {RATIO} (ref 1.2–2.2)
ALP SERPL-CCNC: 46 IU/L (ref 39–117)
ALT SERPL-CCNC: 26 IU/L (ref 0–44)
APPEARANCE UR: CLEAR
AST SERPL-CCNC: 22 IU/L (ref 0–40)
BASOPHILS # BLD AUTO: 0.1 X10E3/UL (ref 0–0.2)
BASOPHILS NFR BLD AUTO: 1 %
BILIRUB SERPL-MCNC: 0.8 MG/DL (ref 0–1.2)
BILIRUB UR QL STRIP: NEGATIVE
BUN SERPL-MCNC: 14 MG/DL (ref 8–27)
BUN/CREAT SERPL: 13 (ref 10–24)
CALCIUM SERPL-MCNC: 9.9 MG/DL (ref 8.6–10.2)
CHLORIDE SERPL-SCNC: 102 MMOL/L (ref 96–106)
CHOLEST SERPL-MCNC: 168 MG/DL (ref 100–199)
CK SERPL-CCNC: 141 U/L (ref 41–331)
CO2 SERPL-SCNC: 27 MMOL/L (ref 20–29)
COLOR UR: YELLOW
CREAT SERPL-MCNC: 1.04 MG/DL (ref 0.76–1.27)
EOSINOPHIL # BLD AUTO: 0 X10E3/UL (ref 0–0.4)
EOSINOPHIL NFR BLD AUTO: 1 %
ERYTHROCYTE [DISTWIDTH] IN BLOOD BY AUTOMATED COUNT: 12.1 % (ref 11.6–15.4)
EST. AVERAGE GLUCOSE BLD GHB EST-MCNC: 126 MG/DL
GLOBULIN SER CALC-MCNC: 2.6 G/DL (ref 1.5–4.5)
GLUCOSE SERPL-MCNC: 112 MG/DL (ref 65–99)
GLUCOSE UR QL: NEGATIVE
HBA1C MFR BLD: 6 % (ref 4.8–5.6)
HCT VFR BLD AUTO: 48 % (ref 37.5–51)
HDLC SERPL-MCNC: 58 MG/DL
HGB BLD-MCNC: 15.6 G/DL (ref 13–17.7)
HGB UR QL STRIP: NEGATIVE
IMM GRANULOCYTES # BLD AUTO: 0 X10E3/UL (ref 0–0.1)
IMM GRANULOCYTES NFR BLD AUTO: 0 %
KETONES UR QL STRIP: NEGATIVE
LDLC SERPL CALC-MCNC: 97 MG/DL (ref 0–99)
LEUKOCYTE ESTERASE UR QL STRIP: NEGATIVE
LYMPHOCYTES # BLD AUTO: 1.4 X10E3/UL (ref 0.7–3.1)
LYMPHOCYTES NFR BLD AUTO: 28 %
MCH RBC QN AUTO: 30.6 PG (ref 26.6–33)
MCHC RBC AUTO-ENTMCNC: 32.5 G/DL (ref 31.5–35.7)
MCV RBC AUTO: 94 FL (ref 79–97)
MICRO URNS: NORMAL
MONOCYTES # BLD AUTO: 0.5 X10E3/UL (ref 0.1–0.9)
MONOCYTES NFR BLD AUTO: 11 %
NEUTROPHILS # BLD AUTO: 3.1 X10E3/UL (ref 1.4–7)
NEUTROPHILS NFR BLD AUTO: 59 %
NITRITE UR QL STRIP: NEGATIVE
PH UR STRIP: 7.5 [PH] (ref 5–7.5)
PLATELET # BLD AUTO: 257 X10E3/UL (ref 150–450)
POTASSIUM SERPL-SCNC: 4.9 MMOL/L (ref 3.5–5.2)
PROT SERPL-MCNC: 7 G/DL (ref 6–8.5)
PROT UR QL STRIP: NEGATIVE
PSA SERPL-MCNC: 3.8 NG/ML (ref 0–4)
RBC # BLD AUTO: 5.09 X10E6/UL (ref 4.14–5.8)
SODIUM SERPL-SCNC: 140 MMOL/L (ref 134–144)
SP GR UR: 1.02 (ref 1–1.03)
T4 FREE SERPL-MCNC: 1.31 NG/DL (ref 0.82–1.77)
TRIGL SERPL-MCNC: 64 MG/DL (ref 0–149)
TSH SERPL DL<=0.005 MIU/L-ACNC: 1.34 UIU/ML (ref 0.45–4.5)
UROBILINOGEN UR STRIP-MCNC: 0.2 MG/DL (ref 0.2–1)
VLDLC SERPL CALC-MCNC: 13 MG/DL (ref 5–40)
WBC # BLD AUTO: 5.2 X10E3/UL (ref 3.4–10.8)

## 2020-09-17 PROBLEM — Z00.00 ANNUAL PHYSICAL EXAM: Status: RESOLVED | Noted: 2019-07-16 | Resolved: 2020-09-17

## 2020-12-12 ENCOUNTER — APPOINTMENT (OUTPATIENT)
Dept: GENERAL RADIOLOGY | Age: 65
End: 2020-12-12
Attending: EMERGENCY MEDICINE
Payer: COMMERCIAL

## 2020-12-12 ENCOUNTER — HOSPITAL ENCOUNTER (EMERGENCY)
Age: 65
Discharge: HOME | End: 2020-12-12
Attending: EMERGENCY MEDICINE
Payer: COMMERCIAL

## 2020-12-12 VITALS
WEIGHT: 145 LBS | HEART RATE: 58 BPM | SYSTOLIC BLOOD PRESSURE: 129 MMHG | OXYGEN SATURATION: 98 % | DIASTOLIC BLOOD PRESSURE: 77 MMHG | TEMPERATURE: 98.1 F | RESPIRATION RATE: 15 BRPM | BODY MASS INDEX: 22.76 KG/M2 | HEIGHT: 67 IN

## 2020-12-12 DIAGNOSIS — R07.9 ACUTE CHEST PAIN: Primary | ICD-10-CM

## 2020-12-12 LAB
ALBUMIN SERPL-MCNC: 3.9 G/DL (ref 3.5–5)
ALBUMIN/GLOB SERPL: 1.3 {RATIO} (ref 1.1–2.2)
ALP SERPL-CCNC: 51 U/L (ref 45–117)
ALT SERPL-CCNC: 39 U/L (ref 12–78)
ANION GAP SERPL CALC-SCNC: 5 MMOL/L (ref 5–15)
AST SERPL-CCNC: 25 U/L (ref 15–37)
ATRIAL RATE: 55 BPM
ATRIAL RATE: 56 BPM
BASOPHILS # BLD: 0.1 K/UL (ref 0–0.1)
BASOPHILS NFR BLD: 1 % (ref 0–1)
BILIRUB SERPL-MCNC: 0.5 MG/DL (ref 0.2–1)
BUN SERPL-MCNC: 19 MG/DL (ref 6–20)
BUN/CREAT SERPL: 17 (ref 12–20)
CALCIUM SERPL-MCNC: 9.2 MG/DL (ref 8.5–10.1)
CALCULATED P AXIS, ECG09: 63 DEGREES
CALCULATED P AXIS, ECG09: 67 DEGREES
CALCULATED R AXIS, ECG10: -32 DEGREES
CALCULATED R AXIS, ECG10: -34 DEGREES
CALCULATED T AXIS, ECG11: 30 DEGREES
CALCULATED T AXIS, ECG11: 32 DEGREES
CHLORIDE SERPL-SCNC: 107 MMOL/L (ref 97–108)
CK SERPL-CCNC: 126 U/L (ref 39–308)
CK SERPL-CCNC: 136 U/L (ref 39–308)
CO2 SERPL-SCNC: 29 MMOL/L (ref 21–32)
COMMENT, HOLDF: NORMAL
CREAT SERPL-MCNC: 1.11 MG/DL (ref 0.7–1.3)
DIAGNOSIS, 93000: NORMAL
DIAGNOSIS, 93000: NORMAL
DIFFERENTIAL METHOD BLD: NORMAL
EOSINOPHIL # BLD: 0.1 K/UL (ref 0–0.4)
EOSINOPHIL NFR BLD: 2 % (ref 0–7)
ERYTHROCYTE [DISTWIDTH] IN BLOOD BY AUTOMATED COUNT: 11.7 % (ref 11.5–14.5)
GLOBULIN SER CALC-MCNC: 3.1 G/DL (ref 2–4)
GLUCOSE SERPL-MCNC: 116 MG/DL (ref 65–100)
HCT VFR BLD AUTO: 45.7 % (ref 36.6–50.3)
HGB BLD-MCNC: 15.4 G/DL (ref 12.1–17)
IMM GRANULOCYTES # BLD AUTO: 0 K/UL (ref 0–0.04)
IMM GRANULOCYTES NFR BLD AUTO: 0 % (ref 0–0.5)
LIPASE SERPL-CCNC: 111 U/L (ref 73–393)
LYMPHOCYTES # BLD: 1.8 K/UL (ref 0.8–3.5)
LYMPHOCYTES NFR BLD: 32 % (ref 12–49)
MAGNESIUM SERPL-MCNC: 2.1 MG/DL (ref 1.6–2.4)
MCH RBC QN AUTO: 31.6 PG (ref 26–34)
MCHC RBC AUTO-ENTMCNC: 33.7 G/DL (ref 30–36.5)
MCV RBC AUTO: 93.6 FL (ref 80–99)
MONOCYTES # BLD: 0.4 K/UL (ref 0–1)
MONOCYTES NFR BLD: 8 % (ref 5–13)
NEUTS SEG # BLD: 3.2 K/UL (ref 1.8–8)
NEUTS SEG NFR BLD: 57 % (ref 32–75)
NRBC # BLD: 0 K/UL (ref 0–0.01)
NRBC BLD-RTO: 0 PER 100 WBC
P-R INTERVAL, ECG05: 166 MS
P-R INTERVAL, ECG05: 168 MS
PLATELET # BLD AUTO: 227 K/UL (ref 150–400)
PMV BLD AUTO: 9.9 FL (ref 8.9–12.9)
POTASSIUM SERPL-SCNC: 4.2 MMOL/L (ref 3.5–5.1)
PROT SERPL-MCNC: 7 G/DL (ref 6.4–8.2)
Q-T INTERVAL, ECG07: 438 MS
Q-T INTERVAL, ECG07: 438 MS
QRS DURATION, ECG06: 94 MS
QRS DURATION, ECG06: 98 MS
QTC CALCULATION (BEZET), ECG08: 419 MS
QTC CALCULATION (BEZET), ECG08: 422 MS
RBC # BLD AUTO: 4.88 M/UL (ref 4.1–5.7)
SAMPLES BEING HELD,HOLD: NORMAL
SODIUM SERPL-SCNC: 141 MMOL/L (ref 136–145)
TROPONIN I SERPL-MCNC: <0.05 NG/ML
TROPONIN I SERPL-MCNC: <0.05 NG/ML
VENTRICULAR RATE, ECG03: 55 BPM
VENTRICULAR RATE, ECG03: 56 BPM
WBC # BLD AUTO: 5.6 K/UL (ref 4.1–11.1)

## 2020-12-12 PROCEDURE — 85025 COMPLETE CBC W/AUTO DIFF WBC: CPT

## 2020-12-12 PROCEDURE — 84484 ASSAY OF TROPONIN QUANT: CPT

## 2020-12-12 PROCEDURE — 83690 ASSAY OF LIPASE: CPT

## 2020-12-12 PROCEDURE — 36415 COLL VENOUS BLD VENIPUNCTURE: CPT

## 2020-12-12 PROCEDURE — 82550 ASSAY OF CK (CPK): CPT

## 2020-12-12 PROCEDURE — 80053 COMPREHEN METABOLIC PANEL: CPT

## 2020-12-12 PROCEDURE — 74011250637 HC RX REV CODE- 250/637: Performed by: EMERGENCY MEDICINE

## 2020-12-12 PROCEDURE — 99285 EMERGENCY DEPT VISIT HI MDM: CPT

## 2020-12-12 PROCEDURE — 93005 ELECTROCARDIOGRAM TRACING: CPT

## 2020-12-12 PROCEDURE — 83735 ASSAY OF MAGNESIUM: CPT

## 2020-12-12 PROCEDURE — 71046 X-RAY EXAM CHEST 2 VIEWS: CPT

## 2020-12-12 RX ORDER — ASPIRIN 325 MG
325 TABLET ORAL ONCE
Status: COMPLETED | OUTPATIENT
Start: 2020-12-12 | End: 2020-12-12

## 2020-12-12 RX ADMIN — ASPIRIN 325 MG ORAL TABLET 325 MG: 325 PILL ORAL at 03:42

## 2020-12-12 NOTE — CONSULTS
Cardiology Consult Note    CC: CP  Reason for consult:  CP  Requesting MD:  Dr. Danielito Levine     Subjective:      Date of  Admission: 12/12/2020  3:12 AM     Admission type:Emergency    Maurice Roche is a 60 y.o. male is seen in ER for episode of chest pain. He was awakened from sleep due to nausea and lower abdominal vague discomfort and urge for defecation. While having BM he felt very weak and lightheaded. He checked his own HR which went down to 20s and he lowered to floor. He then developed chest discomfort. He took a sl HTN and two Plavix. He did admit that he did not take ASA lately as he should. 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 His past cardiac data include CAD, s/p stent in RCA in 2012, s/p stent in LCX in 2016, and hyperlipidemia. He recently was additionally placed on Repatha to further lower his LDL. Patient Active Problem List    Diagnosis Date Noted    Benign prostatic hyperplasia with nocturia 08/19/2020    Glucose intolerance 08/18/2020    Fatigue 07/17/2019    Vitamin D deficiency 07/17/2019    ASCVD (arteriosclerotic cardiovascular disease) 07/16/2019    Mixed hyperlipidemia 07/16/2019    Lumbar disc herniation 05/18/2018    Intractable low back pain 05/18/2018      Pascual Crouch MD  Past Medical History:   Diagnosis Date    CAD (coronary artery disease)     Hyperlipidemia       Past Surgical History:   Procedure Laterality Date    HX ANGIOPLASTY      HX APPENDECTOMY      HX HERNIA REPAIR      HX TONSILLECTOMY      HX WISDOM TEETH EXTRACTION       No Known Allergies   No family history on file. No current facility-administered medications for this encounter. Current Outpatient Medications   Medication Sig    tamsulosin (Flomax) 0.4 mg capsule Take 1 Cap by mouth daily.  aspirin 81 mg chewable tablet Take 1 Tab by mouth daily. May resume in two days.     ibuprofen (MOTRIN) 200 mg tablet Take 2 Tabs by mouth every eight (8) hours as needed for Pain. Hold for two weeks.  rosuvastatin (CRESTOR) 40 mg tablet Take 40 mg by mouth daily.  ezetimibe (ZETIA) 10 mg tablet Take 10 mg by mouth daily.  multivitamin (ONE A DAY) tablet Take 1 Tab by mouth daily. Prior to Admission Medications:  Prior to Admission medications    Medication Sig Start Date End Date Taking? Authorizing Provider   tamsulosin (Flomax) 0.4 mg capsule Take 1 Cap by mouth daily. 20   Rios Bravo MD   aspirin 81 mg chewable tablet Take 1 Tab by mouth daily. May resume in two days. 18   Merleen Ahumada, NP   ibuprofen (MOTRIN) 200 mg tablet Take 2 Tabs by mouth every eight (8) hours as needed for Pain. Hold for two weeks. 18   Merleen Ahumada, NP   rosuvastatin (CRESTOR) 40 mg tablet Take 40 mg by mouth daily. Lyle Denis MD   ezetimibe (ZETIA) 10 mg tablet Take 10 mg by mouth daily. Lyle Denis MD   multivitamin (ONE A DAY) tablet Take 1 Tab by mouth daily. Lyle Denis MD        Review of Symptoms:  Except as noted in HPI, patient denies recent fever or chills, nausea, vomiting, diarrhea, hemoptysis, hematemesis, dysuria, myalgias, focal neurologic symptoms, ecchymosis, angioedema, odynophagia, dysphagia, sore throat, earache,rash, melena, hematochezia, depression, GERD, cold intolerance, petechia, bleeding gums, or significant weight loss. A comprehensive review of systems was negative except for that written in the HPI.      Subjective:    24 hr VS reviewed, overall VSSAF  Temp (24hrs), Av.1 °F (36.7 °C), Min:98.1 °F (36.7 °C), Max:98.1 °F (36.7 °C)    Patient Vitals for the past 8 hrs:   Pulse   20 0536 (!) 53   20 0400 (!) 55   20 0339 (!) 56   20 0332 (!) 59   20 0324 (!) 58    Patient Vitals for the past 8 hrs:   Resp   20 0536 16   20 0400 16   20 0339 11   20 0332 21   20 0324 17    Patient Vitals for the past 8 hrs:   BP   20 0536 113/77 12/12/20 0400 111/73   12/12/20 0339 114/72   12/12/20 0332 111/70   12/12/20 0324 111/70        No intake or output data in the 24 hours ending 12/12/20 0650      Physical Exam (complete single organ system exam)      Visit Vitals  /77   Pulse (!) 53   Temp 98.1 °F (36.7 °C)   Resp 16   Ht 5' 7\" (1.702 m)   Wt 65.8 kg (145 lb)   SpO2 95%   BMI 22.71 kg/m²     General Appearance:  Well developed, well nourished,alert and oriented x 3, and individual in no acute distress. Ears/Nose/Mouth/Throat:   Hearing grossly normal.         Neck: Supple. Chest:   Lungs clear to auscultation bilaterally. Cardiovascular:  Regular rate and rhythm, S1, S2 normal, no murmur. Abdomen:   Soft, non-tender, bowel sounds are active. Extremities: No edema bilaterally. Skin: Warm and dry.                Cardiographics    Telemetry: normal sinus rhythm  ECG: normal EKG, normal sinus rhythm, unchanged from previous tracings  Echocardiogram: Not done    Labs:   Recent Results (from the past 24 hour(s))   EKG, 12 LEAD, INITIAL    Collection Time: 12/12/20  3:24 AM   Result Value Ref Range    Ventricular Rate 56 BPM    Atrial Rate 56 BPM    P-R Interval 168 ms    QRS Duration 98 ms    Q-T Interval 438 ms    QTC Calculation (Bezet) 422 ms    Calculated P Axis 63 degrees    Calculated R Axis -32 degrees    Calculated T Axis 32 degrees    Diagnosis       Sinus bradycardia  Left axis deviation  Inferior infarct , age undetermined  When compared with ECG of 18-MAY-2018 09:34,  Inferior infarct is now present     CBC WITH AUTOMATED DIFF    Collection Time: 12/12/20  3:30 AM   Result Value Ref Range    WBC 5.6 4.1 - 11.1 K/uL    RBC 4.88 4.10 - 5.70 M/uL    HGB 15.4 12.1 - 17.0 g/dL    HCT 45.7 36.6 - 50.3 %    MCV 93.6 80.0 - 99.0 FL    MCH 31.6 26.0 - 34.0 PG    MCHC 33.7 30.0 - 36.5 g/dL    RDW 11.7 11.5 - 14.5 %    PLATELET 825 558 - 460 K/uL    MPV 9.9 8.9 - 12.9 FL    NRBC 0.0 0  WBC    ABSOLUTE NRBC 0.00 0.00 - 0.01 K/uL    NEUTROPHILS 57 32 - 75 %    LYMPHOCYTES 32 12 - 49 %    MONOCYTES 8 5 - 13 %    EOSINOPHILS 2 0 - 7 %    BASOPHILS 1 0 - 1 %    IMMATURE GRANULOCYTES 0 0.0 - 0.5 %    ABS. NEUTROPHILS 3.2 1.8 - 8.0 K/UL    ABS. LYMPHOCYTES 1.8 0.8 - 3.5 K/UL    ABS. MONOCYTES 0.4 0.0 - 1.0 K/UL    ABS. EOSINOPHILS 0.1 0.0 - 0.4 K/UL    ABS. BASOPHILS 0.1 0.0 - 0.1 K/UL    ABS. IMM. GRANS. 0.0 0.00 - 0.04 K/UL    DF AUTOMATED     METABOLIC PANEL, COMPREHENSIVE    Collection Time: 12/12/20  3:30 AM   Result Value Ref Range    Sodium 141 136 - 145 mmol/L    Potassium 4.2 3.5 - 5.1 mmol/L    Chloride 107 97 - 108 mmol/L    CO2 29 21 - 32 mmol/L    Anion gap 5 5 - 15 mmol/L    Glucose 116 (H) 65 - 100 mg/dL    BUN 19 6 - 20 MG/DL    Creatinine 1.11 0.70 - 1.30 MG/DL    BUN/Creatinine ratio 17 12 - 20      GFR est AA >60 >60 ml/min/1.73m2    GFR est non-AA >60 >60 ml/min/1.73m2    Calcium 9.2 8.5 - 10.1 MG/DL    Bilirubin, total 0.5 0.2 - 1.0 MG/DL    ALT (SGPT) 39 12 - 78 U/L    AST (SGOT) 25 15 - 37 U/L    Alk. phosphatase 51 45 - 117 U/L    Protein, total 7.0 6.4 - 8.2 g/dL    Albumin 3.9 3.5 - 5.0 g/dL    Globulin 3.1 2.0 - 4.0 g/dL    A-G Ratio 1.3 1.1 - 2.2     LIPASE    Collection Time: 12/12/20  3:30 AM   Result Value Ref Range    Lipase 111 73 - 393 U/L   MAGNESIUM    Collection Time: 12/12/20  3:30 AM   Result Value Ref Range    Magnesium 2.1 1.6 - 2.4 mg/dL   TROPONIN I    Collection Time: 12/12/20  3:30 AM   Result Value Ref Range    Troponin-I, Qt. <0.05 <0.05 ng/mL   CK W/ REFLX CKMB    Collection Time: 12/12/20  3:30 AM   Result Value Ref Range     39 - 308 U/L   SAMPLES BEING HELD    Collection Time: 12/12/20  3:30 AM   Result Value Ref Range    SAMPLES BEING HELD 1BLU,1RED     COMMENT        Add-on orders for these samples will be processed based on acceptable specimen integrity and analyte stability, which may vary by analyte.    EKG, 12 LEAD, INITIAL    Collection Time: 12/12/20  5:54 AM   Result Value Ref Range    Ventricular Rate 55 BPM    Atrial Rate 55 BPM    P-R Interval 166 ms    QRS Duration 94 ms    Q-T Interval 438 ms    QTC Calculation (Bezet) 419 ms    Calculated P Axis 67 degrees    Calculated R Axis -34 degrees    Calculated T Axis 30 degrees    Diagnosis       Sinus bradycardia  Left axis deviation  Inferior infarct , age undetermined  When compared with ECG of 12-DEC-2020 03:24,  MANUAL COMPARISON REQUIRED, DATA IS UNCONFIRMED          Assessment:     Assessment:   CP; in setting of bradycardia and hypotension; could be ischemia or related to vasovagal reaction  Presyncope; clearly vasovagal reaction  CAD; s/p stents in past  Hyperlipidemia      Plan:   If second troponin is negative he can be released  He will have a CTA of heart with FFR this coming week to further assess coronary status.   He was told to come back if his sx recurs    Maggie Levy MD

## 2020-12-12 NOTE — ED PROVIDER NOTES
HPI   29-year-old male with past medical history of coronary artery disease status post stent placement x2 presents via EMS with chest pain. Patient states around 2 AM he woke up to use the restroom. After having a loose bowel movement he started having some nausea and chest pain. He felt lightheaded and could tell that his heart rate had dropped. He took 1 nitroglycerin and 2 Plavix tablets prior to arrival.  Patient states he recently ran out of his aspirin has been off it for about a week. On EMS arrival his chest pain had completely resolved. His last cardiac catheterization was in 2016 where he had a stent placed in his circumflex. Prior to that he had had a stent placed in 2012 in the right coronary artery. His cardiologist is Dr. Krista Loving. He denies fever, chills, cough, shortness of breath, vomiting, abdominal pain, bloody or black stools, pain or swelling in his legs. Reviewed EMS EKG done while patient was pain-free, normal sinus rhythm no acute ST findings. Past Medical History:   Diagnosis Date    CAD (coronary artery disease)     Hyperlipidemia        Past Surgical History:   Procedure Laterality Date    HX ANGIOPLASTY      HX APPENDECTOMY      HX HERNIA REPAIR      HX TONSILLECTOMY      HX WISDOM TEETH EXTRACTION           No family history on file.     Social History     Socioeconomic History    Marital status:      Spouse name: Not on file    Number of children: Not on file    Years of education: Not on file    Highest education level: Not on file   Occupational History    Not on file   Social Needs    Financial resource strain: Not on file    Food insecurity     Worry: Not on file     Inability: Not on file    Transportation needs     Medical: Not on file     Non-medical: Not on file   Tobacco Use    Smoking status: Never Smoker    Smokeless tobacco: Never Used   Substance and Sexual Activity    Alcohol use: Yes     Comment: social    Drug use: No    Sexual activity: Not on file   Lifestyle    Physical activity     Days per week: Not on file     Minutes per session: Not on file    Stress: Not on file   Relationships    Social connections     Talks on phone: Not on file     Gets together: Not on file     Attends Sabianist service: Not on file     Active member of club or organization: Not on file     Attends meetings of clubs or organizations: Not on file     Relationship status: Not on file    Intimate partner violence     Fear of current or ex partner: Not on file     Emotionally abused: Not on file     Physically abused: Not on file     Forced sexual activity: Not on file   Other Topics Concern    Not on file   Social History Narrative    Not on file         ALLERGIES: Patient has no known allergies. Review of Systems   Constitutional: Negative for chills and fever. Respiratory: Negative for cough and shortness of breath. Cardiovascular: Positive for chest pain. Negative for palpitations and leg swelling. Gastrointestinal: Positive for nausea. Negative for abdominal pain, constipation, diarrhea and vomiting. Genitourinary: Negative for dysuria and hematuria. Skin: Negative for rash. Neurological: Positive for light-headedness. All other systems reviewed and are negative. There were no vitals filed for this visit.          Physical Exam   Physical Examination: General appearance - alert, well appearing, and in no distress, oriented to person, place, and time and normal appearing weight  Eyes - pupils equal and reactive, extraocular eye movements intact  Neck - supple, no significant adenopathy  Chest - clear to auscultation, no wheezes, rales or rhonchi, symmetric air entry  Heart - normal rate, regular rhythm, normal S1, S2, no murmurs, rubs, clicks or gallops  Abdomen - soft, nontender, nondistended, no masses or organomegaly  Back exam - full range of motion, no tenderness, palpable spasm or pain on motion  Neurological - alert, oriented, normal speech, no focal findings or movement disorder noted  Musculoskeletal - no joint tenderness, deformity or swelling  Extremities - peripheral pulses normal, no pedal edema, no clubbing or cyanosis  Skin - normal coloration and turgor, no rashes, no suspicious skin lesions noted  MDM  Number of Diagnoses or Management Options  Acute chest pain:      Amount and/or Complexity of Data Reviewed  Clinical lab tests: ordered and reviewed  Tests in the radiology section of CPT®: ordered and reviewed  Decide to obtain previous medical records or to obtain history from someone other than the patient: yes  Obtain history from someone other than the patient: yes (EMS)  Review and summarize past medical records: yes  Discuss the patient with other providers: yes (cardiology)  Independent visualization of images, tracings, or specimens: yes    Patient Progress  Patient progress: improved         Procedures  ED EKG interpretation:  Rhythm: sinus bradycardia; and regular . Rate (approx.): 56; Axis: left axis deviation; P wave: normal; QRS interval: normal ; ST/T wave: non-specific changes;    EKG documented by Yoel Rogers MD    ED EKG interpretation repeat EKG:  Rhythm: sinus bradycardia; and regular . Rate (approx.): 55; Axis: left axis deviation; P wave: normal; QRS interval: normal ; ST/T wave: non-specific changes;   EKG documented by Yoel Rogers MD    6:14 AM  Discussed with Dr. Iram Roberto, cardiology. Will evaluate pt in ED. Updated pt on test results and plan for cardiology evaluation. Pt is still cp free, VSS. Repeat CE pending.

## 2020-12-12 NOTE — ED NOTES
Pt ambulated independently to and from bathroom. Tolerated well. Denied CP, SOB, dizziness and nausea.

## 2020-12-12 NOTE — DISCHARGE INSTRUCTIONS
Patient Education        Chest Pain: Care Instructions  Your Care Instructions     There are many things that can cause chest pain. Some are not serious and will get better on their own in a few days. But some kinds of chest pain need more testing and treatment. Your doctor may have recommended a follow-up visit in the next 8 to 12 hours. If you are not getting better, you may need more tests or treatment. Even though your doctor has released you, you still need to watch for any problems. The doctor carefully checked you, but sometimes problems can develop later. If you have new symptoms or if your symptoms do not get better, get medical care right away. If you have worse or different chest pain or pressure that lasts more than 5 minutes or you passed out (lost consciousness), call 911 or seek other emergency help right away. A medical visit is only one step in your treatment. Even if you feel better, you still need to do what your doctor recommends, such as going to all suggested follow-up appointments and taking medicines exactly as directed. This will help you recover and help prevent future problems. How can you care for yourself at home? · Rest until you feel better. · Take your medicine exactly as prescribed. Call your doctor if you think you are having a problem with your medicine. · Do not drive after taking a prescription pain medicine. When should you call for help? Call 911 if:     · You passed out (lost consciousness).     · You have severe difficulty breathing.     · You have symptoms of a heart attack. These may include:  ? Chest pain or pressure, or a strange feeling in your chest.  ? Sweating. ? Shortness of breath. ? Nausea or vomiting. ? Pain, pressure, or a strange feeling in your back, neck, jaw, or upper belly or in one or both shoulders or arms. ? Lightheadedness or sudden weakness. ? A fast or irregular heartbeat.   After you call 911, the  may tell you to chew 1 adult-strength or 2 to 4 low-dose aspirin. Wait for an ambulance. Do not try to drive yourself. Call your doctor today if:     · You have any trouble breathing.     · Your chest pain gets worse.     · You are dizzy or lightheaded, or you feel like you may faint.     · You are not getting better as expected.     · You are having new or different chest pain. Where can you learn more? Go to http://rsoeann-todd.info/  Enter A120 in the search box to learn more about \"Chest Pain: Care Instructions. \"  Current as of: June 26, 2019               Content Version: 12.6  © 0731-1720 [employer]. Care instructions adapted under license by [employer] (which disclaims liability or warranty for this information). If you have questions about a medical condition or this instruction, always ask your healthcare professional. [address] any warranty or liability for your use of this information. We hope that we have addressed all of your medical concerns. The examination and treatment you received in the Emergency Department were for an emergent problem and were not intended as complete care. It is important that you follow up with your healthcare provider(s) for ongoing care. If your symptoms worsen or do not improve as expected, and you are unable to reach your usual health care provider(s), you should return to the Emergency Department. Today's healthcare is undergoing tremendous change, and patient satisfaction surveys are one of the many tools to assess the quality of medical care. You may receive a survey from the [employer] organization regarding your experience in the Emergency Department. I hope that your experience has been completely positive, particularly the medical care that I provided. As such, please participate in the survey; anything less than excellent does not meet my expectations or intentions.         [facility] and [facility] participate in nationally recognized quality of care measures. If your blood pressure is greater than 120/80, as reported below, we urge that you seek medical care to address the potential of high blood pressure, commonly known as hypertension. Hypertension can be hereditary or can be caused by certain medical conditions, pain, stress, or \"white coat syndrome. \"       Please make an appointment with your health care provider(s) for follow up of your Emergency Department visit. VITALS:   Patient Vitals for the past 8 hrs:   Temp Pulse Resp BP SpO2   12/12/20 0536 -- (!) 53 16 113/77 95 %   12/12/20 0422 -- -- -- -- 99 %   12/12/20 0400 -- (!) 55 16 111/73 98 %   12/12/20 0358 -- -- -- -- 99 %   12/12/20 0339 -- (!) 56 11 114/72 98 %   12/12/20 0332 -- (!) 59 21 111/70 98 %   12/12/20 0324 98.1 °F (36.7 °C) (!) 58 17 111/70 99 %          Thank you for allowing us to provide you with medical care today. We realize that you have many choices for your emergency care needs. Please choose us in the future for any continued health care needs. Karina Dasilva  [facility], [address] [place] [address].   Office: [phone]            Recent Results (from the past 24 hour(s))   EKG, 12 LEAD, INITIAL    Collection Time: 12/12/20  3:24 AM   Result Value Ref Range    Ventricular Rate 56 BPM    Atrial Rate 56 BPM    P-R Interval 168 ms    QRS Duration 98 ms    Q-T Interval 438 ms    QTC Calculation (Bezet) 422 ms    Calculated P Axis 63 degrees    Calculated R Axis -32 degrees    Calculated T Axis 32 degrees    Diagnosis       Sinus bradycardia  Left axis deviation  Inferior infarct , age undetermined  When compared with ECG of 18-MAY-2018 09:34,  Inferior infarct is now present     CBC WITH AUTOMATED DIFF    Collection Time: 12/12/20  3:30 AM   Result Value Ref Range    WBC 5.6 4.1 - 11.1 K/uL    RBC 4.88 4.10 - 5.70 M/uL    HGB 15.4 12.1 - 17.0 g/dL    HCT 45.7 36.6 - 50.3 %    MCV 93.6 80.0 - 99.0 FL    MCH 31.6 26.0 - 34.0 PG    MCHC 33.7 30.0 - 36.5 g/dL    RDW 11.7 11.5 - 14.5 %    PLATELET 454 028 - 335 K/uL    MPV 9.9 8.9 - 12.9 FL    NRBC 0.0 0  WBC    ABSOLUTE NRBC 0.00 0.00 - 0.01 K/uL    NEUTROPHILS 57 32 - 75 %    LYMPHOCYTES 32 12 - 49 %    MONOCYTES 8 5 - 13 %    EOSINOPHILS 2 0 - 7 %    BASOPHILS 1 0 - 1 %    IMMATURE GRANULOCYTES 0 0.0 - 0.5 %    ABS. NEUTROPHILS 3.2 1.8 - 8.0 K/UL    ABS. LYMPHOCYTES 1.8 0.8 - 3.5 K/UL    ABS. MONOCYTES 0.4 0.0 - 1.0 K/UL    ABS. EOSINOPHILS 0.1 0.0 - 0.4 K/UL    ABS. BASOPHILS 0.1 0.0 - 0.1 K/UL    ABS. IMM. GRANS. 0.0 0.00 - 0.04 K/UL    DF AUTOMATED     METABOLIC PANEL, COMPREHENSIVE    Collection Time: 12/12/20  3:30 AM   Result Value Ref Range    Sodium 141 136 - 145 mmol/L    Potassium 4.2 3.5 - 5.1 mmol/L    Chloride 107 97 - 108 mmol/L    CO2 29 21 - 32 mmol/L    Anion gap 5 5 - 15 mmol/L    Glucose 116 (H) 65 - 100 mg/dL    BUN 19 6 - 20 MG/DL    Creatinine 1.11 0.70 - 1.30 MG/DL    BUN/Creatinine ratio 17 12 - 20      GFR est AA >60 >60 ml/min/1.73m2    GFR est non-AA >60 >60 ml/min/1.73m2    Calcium 9.2 8.5 - 10.1 MG/DL    Bilirubin, total 0.5 0.2 - 1.0 MG/DL    ALT (SGPT) 39 12 - 78 U/L    AST (SGOT) 25 15 - 37 U/L    Alk.  phosphatase 51 45 - 117 U/L    Protein, total 7.0 6.4 - 8.2 g/dL    Albumin 3.9 3.5 - 5.0 g/dL    Globulin 3.1 2.0 - 4.0 g/dL    A-G Ratio 1.3 1.1 - 2.2     LIPASE    Collection Time: 12/12/20  3:30 AM   Result Value Ref Range    Lipase 111 73 - 393 U/L   MAGNESIUM    Collection Time: 12/12/20  3:30 AM   Result Value Ref Range    Magnesium 2.1 1.6 - 2.4 mg/dL   TROPONIN I    Collection Time: 12/12/20  3:30 AM   Result Value Ref Range    Troponin-I, Qt. <0.05 <0.05 ng/mL   CK W/ REFLX CKMB    Collection Time: 12/12/20  3:30 AM   Result Value Ref Range     39 - 308 U/L   SAMPLES BEING HELD    Collection Time: 12/12/20  3:30 AM   Result Value Ref Range SAMPLES BEING HELD 1BLU,1RED     COMMENT        Add-on orders for these samples will be processed based on acceptable specimen integrity and analyte stability, which may vary by analyte. EKG, 12 LEAD, INITIAL    Collection Time: 12/12/20  5:54 AM   Result Value Ref Range    Ventricular Rate 55 BPM    Atrial Rate 55 BPM    P-R Interval 166 ms    QRS Duration 94 ms    Q-T Interval 438 ms    QTC Calculation (Bezet) 419 ms    Calculated P Axis 67 degrees    Calculated R Axis -34 degrees    Calculated T Axis 30 degrees    Diagnosis       Sinus bradycardia  Left axis deviation  Inferior infarct , age undetermined  When compared with ECG of 12-DEC-2020 03:24,  MANUAL COMPARISON REQUIRED, DATA IS UNCONFIRMED         Xr Chest Pa Lat    Result Date: 12/12/2020  Clinical data indication: S pain. Frontal and lateral views of the chest obtained. No prior. The  heart size is normal. There is no acute infiltrate. IMPRESSION: No acute changes.

## 2020-12-12 NOTE — ED NOTES
Verbal shift change report given to 1710 Nikita Stanton (oncoming nurse) by Edis Peters RN (offgoing nurse). Report included the following information SBAR, ED Summary, Procedure Summary, Intake/Output, MAR and Recent Results.

## 2020-12-12 NOTE — ED NOTES
Bedside and Verbal shift change report given to Lula Liu RN (oncoming nurse) by Jamin Gomez RN (offgoing nurse). Report included the following information SBAR, ED Summary, MAR and Recent Results.

## 2020-12-12 NOTE — ED TRIAGE NOTES
Pt BIBA for CP when he woke up around 2am. Pt was having a BM when he started \"feeling faint\" with nausea. Pt is not normally on Plavix, but took 2 tabs and 1 SL Nitro prior to arrival, which made him feel better. Pt denies SOB and/or dizziness. Pt denies active cp at this time. Pt has cardiac history (R Coronary 2012), NSTEMI 2016 (cath w/stents). Pt A&O x4 answering questions appropriately with non-labored respirations, non-diaphoretic, and no other complaints at this time. Pt placed on cardiac monitor, EKG done and given to ER MD, tolerated labs drawn via RAC 20G placement, and went for XR. Pt stable in no acute distress at this time.

## 2020-12-23 NOTE — PERIOP NOTES
Patient denied any COVID-19 symptoms or possible exposure that would require a pre-procedural COVID-19 test for the Cath Lab procedure scheduled on 12/31.

## 2020-12-31 ENCOUNTER — HOSPITAL ENCOUNTER (OUTPATIENT)
Age: 65
Discharge: HOME | End: 2020-12-31
Attending: INTERNAL MEDICINE | Admitting: INTERNAL MEDICINE
Payer: COMMERCIAL

## 2020-12-31 VITALS
BODY MASS INDEX: 22.73 KG/M2 | TEMPERATURE: 98.2 F | OXYGEN SATURATION: 96 % | HEIGHT: 68 IN | RESPIRATION RATE: 18 BRPM | SYSTOLIC BLOOD PRESSURE: 117 MMHG | DIASTOLIC BLOOD PRESSURE: 68 MMHG | WEIGHT: 150 LBS | HEART RATE: 66 BPM

## 2020-12-31 DIAGNOSIS — R07.9 CHEST PAIN, UNSPECIFIED TYPE: ICD-10-CM

## 2020-12-31 LAB
ACT BLD: 329 SECS (ref 79–138)
CHOLEST SERPL-MCNC: 68 MG/DL
CRD SYSTOLIC BP: 10
EST. AVERAGE GLUCOSE BLD GHB EST-MCNC: 117 MG/DL
HBA1C MFR BLD: 5.7 % (ref 4–5.6)
HDLC SERPL-MCNC: 58 MG/DL
HDLC SERPL: 1.2 {RATIO} (ref 0–5)
LDLC SERPL CALC-MCNC: 5 MG/DL (ref 0–100)
LIPID PROFILE,FLP: NORMAL
TRIGL SERPL-MCNC: 25 MG/DL (ref ?–150)
VLDLC SERPL CALC-MCNC: 5 MG/DL

## 2020-12-31 PROCEDURE — 77030010221 HC SPLNT WR POS TELE -B: Performed by: INTERNAL MEDICINE

## 2020-12-31 PROCEDURE — 93005 ELECTROCARDIOGRAM TRACING: CPT

## 2020-12-31 PROCEDURE — 77030008543 HC TBNG MON PRSS MRTM -A: Performed by: INTERNAL MEDICINE

## 2020-12-31 PROCEDURE — C1894 INTRO/SHEATH, NON-LASER: HCPCS | Performed by: INTERNAL MEDICINE

## 2020-12-31 PROCEDURE — 93458 L HRT ARTERY/VENTRICLE ANGIO: CPT | Performed by: INTERNAL MEDICINE

## 2020-12-31 PROCEDURE — C1887 CATHETER, GUIDING: HCPCS | Performed by: INTERNAL MEDICINE

## 2020-12-31 PROCEDURE — 74011250637 HC RX REV CODE- 250/637: Performed by: INTERNAL MEDICINE

## 2020-12-31 PROCEDURE — 80061 LIPID PANEL: CPT

## 2020-12-31 PROCEDURE — 74011000250 HC RX REV CODE- 250: Performed by: INTERNAL MEDICINE

## 2020-12-31 PROCEDURE — 74011250636 HC RX REV CODE- 250/636: Performed by: INTERNAL MEDICINE

## 2020-12-31 PROCEDURE — 92928 PRQ TCAT PLMT NTRAC ST 1 LES: CPT | Performed by: INTERNAL MEDICINE

## 2020-12-31 PROCEDURE — C1769 GUIDE WIRE: HCPCS | Performed by: INTERNAL MEDICINE

## 2020-12-31 PROCEDURE — 83036 HEMOGLOBIN GLYCOSYLATED A1C: CPT

## 2020-12-31 PROCEDURE — 77030019698 HC SYR ANGI MDLON MRTM -A: Performed by: INTERNAL MEDICINE

## 2020-12-31 PROCEDURE — C1874 STENT, COATED/COV W/DEL SYS: HCPCS | Performed by: INTERNAL MEDICINE

## 2020-12-31 PROCEDURE — 36415 COLL VENOUS BLD VENIPUNCTURE: CPT

## 2020-12-31 PROCEDURE — 85347 COAGULATION TIME ACTIVATED: CPT

## 2020-12-31 PROCEDURE — 74011000636 HC RX REV CODE- 636: Performed by: INTERNAL MEDICINE

## 2020-12-31 PROCEDURE — 77030019569 HC BND COMPR RAD TERU -B: Performed by: INTERNAL MEDICINE

## 2020-12-31 PROCEDURE — 76937 US GUIDE VASCULAR ACCESS: CPT | Performed by: INTERNAL MEDICINE

## 2020-12-31 PROCEDURE — 74011000258 HC RX REV CODE- 258: Performed by: INTERNAL MEDICINE

## 2020-12-31 PROCEDURE — C1725 CATH, TRANSLUMIN NON-LASER: HCPCS | Performed by: INTERNAL MEDICINE

## 2020-12-31 PROCEDURE — 99152 MOD SED SAME PHYS/QHP 5/>YRS: CPT | Performed by: INTERNAL MEDICINE

## 2020-12-31 PROCEDURE — 77030004549 HC CATH ANGI DX PRF MRTM -A: Performed by: INTERNAL MEDICINE

## 2020-12-31 PROCEDURE — 77030030195 HC CATH ANGI DX PRF4 MRTM -A: Performed by: INTERNAL MEDICINE

## 2020-12-31 PROCEDURE — 77030019697 HC SYR ANGI INFL MRTM -B: Performed by: INTERNAL MEDICINE

## 2020-12-31 PROCEDURE — 77030028837 HC SYR ANGI PWR INJ COEU -A: Performed by: INTERNAL MEDICINE

## 2020-12-31 PROCEDURE — 99153 MOD SED SAME PHYS/QHP EA: CPT | Performed by: INTERNAL MEDICINE

## 2020-12-31 DEVICE — STENT RONYX20030UX RESOLUTE ONYX 2.00X30
Type: IMPLANTABLE DEVICE | Status: FUNCTIONAL
Brand: RESOLUTE ONYX™

## 2020-12-31 RX ORDER — MIDAZOLAM HYDROCHLORIDE 1 MG/ML
INJECTION, SOLUTION INTRAMUSCULAR; INTRAVENOUS AS NEEDED
Status: DISCONTINUED | OUTPATIENT
Start: 2020-12-31 | End: 2020-12-31 | Stop reason: HOSPADM

## 2020-12-31 RX ORDER — LIDOCAINE HYDROCHLORIDE 10 MG/ML
INJECTION, SOLUTION EPIDURAL; INFILTRATION; INTRACAUDAL; PERINEURAL AS NEEDED
Status: DISCONTINUED | OUTPATIENT
Start: 2020-12-31 | End: 2020-12-31 | Stop reason: HOSPADM

## 2020-12-31 RX ORDER — HEPARIN SODIUM 200 [USP'U]/100ML
INJECTION, SOLUTION INTRAVENOUS
Status: COMPLETED | OUTPATIENT
Start: 2020-12-31 | End: 2020-12-31

## 2020-12-31 RX ORDER — NALOXONE HYDROCHLORIDE 0.4 MG/ML
0.4 INJECTION, SOLUTION INTRAMUSCULAR; INTRAVENOUS; SUBCUTANEOUS AS NEEDED
Status: DISCONTINUED | OUTPATIENT
Start: 2020-12-31 | End: 2020-12-31 | Stop reason: HOSPADM

## 2020-12-31 RX ORDER — GUAIFENESIN 100 MG/5ML
81 LIQUID (ML) ORAL DAILY
Status: DISCONTINUED | OUTPATIENT
Start: 2021-01-01 | End: 2020-12-31 | Stop reason: HOSPADM

## 2020-12-31 RX ORDER — LOSARTAN POTASSIUM 25 MG/1
12.5 TABLET ORAL DAILY
Qty: 45 TAB | Refills: 3 | Status: SHIPPED | OUTPATIENT
Start: 2020-12-31 | End: 2020-12-31 | Stop reason: SDUPTHER

## 2020-12-31 RX ORDER — ONDANSETRON 2 MG/ML
4 INJECTION INTRAMUSCULAR; INTRAVENOUS
Status: DISCONTINUED | OUTPATIENT
Start: 2020-12-31 | End: 2020-12-31 | Stop reason: HOSPADM

## 2020-12-31 RX ORDER — SODIUM CHLORIDE 0.9 % (FLUSH) 0.9 %
5-40 SYRINGE (ML) INJECTION AS NEEDED
Status: DISCONTINUED | OUTPATIENT
Start: 2020-12-31 | End: 2020-12-31 | Stop reason: HOSPADM

## 2020-12-31 RX ORDER — SODIUM CHLORIDE 0.9 % (FLUSH) 0.9 %
5-40 SYRINGE (ML) INJECTION EVERY 8 HOURS
Status: DISCONTINUED | OUTPATIENT
Start: 2020-12-31 | End: 2020-12-31 | Stop reason: HOSPADM

## 2020-12-31 RX ORDER — ACETAMINOPHEN 325 MG/1
650 TABLET ORAL
Status: DISCONTINUED | OUTPATIENT
Start: 2020-12-31 | End: 2020-12-31 | Stop reason: HOSPADM

## 2020-12-31 RX ORDER — SODIUM CHLORIDE 9 MG/ML
100 INJECTION, SOLUTION INTRAVENOUS CONTINUOUS
Status: DISCONTINUED | OUTPATIENT
Start: 2020-12-31 | End: 2020-12-31 | Stop reason: HOSPADM

## 2020-12-31 RX ORDER — HEPARIN SODIUM 1000 [USP'U]/ML
INJECTION, SOLUTION INTRAVENOUS; SUBCUTANEOUS AS NEEDED
Status: DISCONTINUED | OUTPATIENT
Start: 2020-12-31 | End: 2020-12-31 | Stop reason: HOSPADM

## 2020-12-31 RX ORDER — FENTANYL CITRATE 50 UG/ML
INJECTION, SOLUTION INTRAMUSCULAR; INTRAVENOUS AS NEEDED
Status: DISCONTINUED | OUTPATIENT
Start: 2020-12-31 | End: 2020-12-31 | Stop reason: HOSPADM

## 2020-12-31 RX ORDER — VERAPAMIL HYDROCHLORIDE 2.5 MG/ML
INJECTION, SOLUTION INTRAVENOUS AS NEEDED
Status: DISCONTINUED | OUTPATIENT
Start: 2020-12-31 | End: 2020-12-31 | Stop reason: HOSPADM

## 2020-12-31 RX ORDER — LOSARTAN POTASSIUM 25 MG/1
12.5 TABLET ORAL DAILY
Qty: 45 TAB | Refills: 3 | Status: SHIPPED | OUTPATIENT
Start: 2020-12-31 | End: 2021-09-01 | Stop reason: ALTCHOICE

## 2020-12-31 RX ADMIN — ACETAMINOPHEN 650 MG: 325 TABLET ORAL at 09:44

## 2020-12-31 RX ADMIN — BIVALIRUDIN 0.5 MG/KG/HR: 250 INJECTION, POWDER, LYOPHILIZED, FOR SOLUTION INTRAVENOUS at 09:00

## 2020-12-31 RX ADMIN — BIVALIRUDIN 0.5 MG/KG/HR: 250 INJECTION, POWDER, LYOPHILIZED, FOR SOLUTION INTRAVENOUS at 09:46

## 2020-12-31 RX ADMIN — SODIUM CHLORIDE 100 ML/HR: 9 INJECTION, SOLUTION INTRAVENOUS at 09:46

## 2020-12-31 NOTE — Clinical Note
Lesion located in the Distal RCA. Balloon inflated using single inflation technique. Lesion #1: Pressure = 10 yomi; Duration = 15 sec.

## 2020-12-31 NOTE — PROGRESS NOTES
Cardiac Cath Lab Recovery Arrival Note:      Lesvia Covington arrived to Cardiac Cath Lab, Recovery Area. Staff introduced to patient. Patient identifiers verified with NAME and DATE OF BIRTH. Procedure verified with patient. Consent forms reviewed and signed by patient or authorized representative and verified. Allergies verified. Patient and family oriented to department. Patient and family informed of procedure and plan of care. Questions answered with review. Patient prepped for procedure, per orders from physician, prior to arrival.    Patient on cardiac monitor, non-invasive blood pressure, SPO2 monitor. On RA. Patient is A&Ox 4. Patient reports no complaints. Patient in stretcher, in low position, with side rails up, call bell within reach, patient instructed to call if assistance as needed. Patient prep in: [address], [place]. Patient family has pager # none  Family in: [facility] waiting area . Prep by:  Marco REA and Albertina Lentz RN

## 2020-12-31 NOTE — Clinical Note
TRANSFER - OUT REPORT:     Verbal report given to: Chela Waters. Report consisted of patient's Situation, Background, Assessment and   Recommendations(SBAR). Opportunity for questions and clarification was provided. Patient transported with a Registered Nurse and [address] [place] [address] / [facility]. Patient transported to: [facility].

## 2020-12-31 NOTE — Clinical Note
Lesion located in the R PDA. Balloon inflated using single inflation technique. Lesion #1: Pressure = 8 yomi; Duration = 15 sec.

## 2020-12-31 NOTE — Clinical Note
Lesion located in the Distal RCA. Balloon inserted. Balloon inflated using single inflation technique. Lesion #1: Pressure = 16 yomi; Duration = 20 sec.

## 2020-12-31 NOTE — Clinical Note
Lesion: Located in the Distal RCA. Stent deployed. Single technique used. First inflation pressure = 12 yomi; inflation time: 20 sec.  DISTAL RCA TO PDA

## 2020-12-31 NOTE — CARDIO/PULMONARY
Cardiac Rehab Note: chart review     Cardiac cath with stent 12/31/20    Smoking history assessed. Patient is a non smoker. Smoking Cessation Program link has not been added to the AVS.     \"recent echo with normal EF\" per cath report    CAD education folder, with heart heathy diet, warning signs, heart facts, catheterization brochure, and out patient cardiac rehab program provided to Ramez Barriga on 12/31/20                                              Patient is cardiologist and familiar with cardiac teaching. Emphasized the value of cardiac rehab. Discussed Cardiac Rehab Program format, benefits, and encouraged enrollment to assist with risk modification and management. Patient stated he cycles regularly but would be interested in meeting with RT to fine tune diet.  will be notified to set up appointment. Ramez Barriga verbalized understanding with questions answered.   Audrey Mccartney RN

## 2020-12-31 NOTE — DISCHARGE INSTRUCTIONS
[address] [facility], [address]   [phone]  [address]    [email or web address]    Patient Discharge Instructions    Quinn Steven / [id number] : 1955    Admitted 2020 Discharged: 2020       · It is important that you take the medication exactly as they are prescribed. · Keep your medication in the bottles provided by the pharmacist and keep a list of the medication names, dosages, and times to be taken in your wallet. · Do not take other medications without consulting your doctor. BRING ALL OF YOUR MEDICINES TO YOUR OFFICE VISIT. Follow-up with Uvaldo Mackenzie MD in 2 weeks. Cardiac Catheterization  Discharge Instructions     Do not drive, operate any machinery, or sign any legal documents for 24 hours after your procedure. You must have someone to drive you home.  You may take a shower 24 hours after your cardiac catheterization. Be sure to get the dressing wet and then remove it; gently wash the area with warm soapy water. Pat dry and leave open to air. To help prevent infections, be sure to keep the cath site clean and dry. No lotions, creams, powders, ointments, etc. in the cath site for approximately 1 week.  Do not take a tub bath, get in a hot tub or swimming pool for approximately 5 days or until the cath site is completely healed.  No strenuous activity or heavy lifting over 10 lbs. for 7 days.  Drink plenty of fluids for 24-48 hours after your cath to flush the contrast dye from your kidneys. No alcoholic beverages for 24 hours. You may resume your previous diet (low fat, low cholesterol) after your cath.  After your cath, some bruising or discomfort is common during the healing process. Tylenol, 1-2 tablets every 6 hours as needed, is recommended if you experience any discomfort.   If you experience any signs or symptoms of infection such as fever, chills, or poorly healing incision, persistent tenderness or swelling in the groin, redness and/or warmth to the touch, numbness, significant tingling or pain at the groin site or affected extremity, rash, drainage from the cath site, or if the leg feels tight or swollen, call your physician right away.  If bleeding at the cath site occurs, take a clean gauze pad and apply direct pressure to the groin just above the puncture site. Call 911 immediately, and continue to apply direct pressure until an ambulance gets to your location.  You may return to work  2  days after your cardiac cath if no groin bleeding. Information obtained by :  I understand that if any problems occur once I am at home I am to contact my physician. I understand and acknowledge receipt of the instructions indicated above. R.N.'s Signature                                                                  Date/Time                                                                                                                                              Patient or Representative Signature                                                          Date/Time      Jenae England MD             [address] [place] [address], [address] [phone]  [address]    [email or web address]

## 2020-12-31 NOTE — PROGRESS NOTES
Patient's name showed up on my inpatient list today in EMR  Obviously not experiencing any neurosurgical problems, deferring to medical team

## 2021-01-01 LAB
ATRIAL RATE: 62 BPM
CALCULATED P AXIS, ECG09: 67 DEGREES
CALCULATED R AXIS, ECG10: -42 DEGREES
CALCULATED T AXIS, ECG11: 41 DEGREES
DIAGNOSIS, 93000: NORMAL
P-R INTERVAL, ECG05: 164 MS
Q-T INTERVAL, ECG07: 424 MS
QRS DURATION, ECG06: 98 MS
QTC CALCULATION (BEZET), ECG08: 430 MS
VENTRICULAR RATE, ECG03: 62 BPM

## 2021-08-31 NOTE — PROGRESS NOTES
HPI:   73 y.o.  presents for comprehensive annual healthcare examination and follow up appointment. No hospital, ER or specialist visits since last primary care visit except as noted below. He is followed regular for hyperlipidemia, glucose intolerance, vitamin D deficiency, BPH, atherosclerotic coronary disease now status post stent placement on 3 occasions 2012, 2016 and 2020. He currently denies any chest pain, shortness of breath, palpitations, PND, orthopnea or other cardiac or respiratory complaints. He notes no GI or  complaints. He notes no headaches, dizziness or neurologic complaints. He notes no current active arthritic complaints and he is riding his bike on a regular basis now. He notes no other complaints on complete review of systems. Patient Active Problem List    Diagnosis    Benign prostatic hyperplasia with nocturia    Glucose intolerance     July 2019 fasting blood sugar 105 and hemoglobin A1c of 5.9      Fatigue    Vitamin D deficiency    Annual physical exam    ASCVD (arteriosclerotic cardiovascular disease)     2012stent RCA  2016stent circumflex  12/31/2021Severe distal RCA and rPDA disease treated with one JOSÉ. Co-dominant system. Patent mid LCx stent involves bifurcation with large OM that has a proximal 50% stenosis. There is also a third OM that has a 50% proximal stenosis. There are patent proximal and mid/distal RCA stents. There is a short unstented segment in the mid RCA that has 40% stenosis. The distal RCA has a 90% stenosis and there is a 90% rPDA stenosis.  Mixed hyperlipidemia    Lumbar disc herniation     L 4-5 treated surgically May 2018      Intractable low back pain       Past Medical History:   Diagnosis Date    CAD (coronary artery disease)     Hyperlipidemia        Social History     Tobacco Use    Smoking status: Never Smoker    Smokeless tobacco: Never Used   Substance Use Topics    Alcohol use: Yes     Comment: social    Drug use:  No History reviewed. No pertinent family history. No Known Allergies    ROS:     Constitutional: He denies fevers, weight loss, sweats. Eyes: No blurred or double vision. ENT: No difficulty with swallowing, taste, speech or smell. Neck: no stiffness or swelling  Respiratory: No cough wheezing or shortness of breath. Cardiovascular: Denies chest pain, palpitations, unexplained indigestion or syncope. Gastrointestinal:  No changes in bowel movements, no abdominal pain, no bloating. Genitourinary:  He denies frequency, nocturia or stranguria. Extremities: No joint pain, stiffness or swelling. Neurological:  No numbness, tingling, burring paresthesias or loss of motor strength. No syncope, dizziness or frequent headache  Lymphatic: no adenopathy noted  Hematologic: no easy bruising or bleeding gums  Skin:  No recent rashes or mole changes. Psychiatric/Behavioral:  Negative for depression. The patient is not nervous/anxious. PE:     Vitals:    09/01/21 0815   BP: 115/76   Pulse: 71   Resp: 16   Temp: 97.7 °F (36.5 °C)   TempSrc: Oral   SpO2: 96%   Weight: 151 lb 14.4 oz (68.9 kg)   Height: 5' 8\" (1.727 m)   PainSc:   0 - No pain        General appearance - alert, well appearing, and in no distress  Mental status - alert, oriented to person, place, and time  HEENT:  Ears - bilateral TM's and external ear canals clear  Eyes - pupillary responses were normal.  Extraocular muscle function intact. Lids and conjunctiva not injected. Fundoscopic exam revealed sharp disc margins. eye movements intact  Pharynx- clear with teeth in good repair. No masses were noted  Neck - supple without thyromegaly or burit. No JVD noted  Lungs - clear to auscultation and percussion  Cardiac- normal rate, regular rhythm without murmurs. PMI not displaced. No gallop, rub or click  Abdomen - flat, soft, non-tender without palpable organomegaly or mass. No pulsatile mass was felt, and not bruit was heard.   Bowel sounds were active  : Circumcised, Testes descended w/o masses  Rectal: normal sphincter tone, prostate normal, no masses, stool brown and hemacult negative  Extremities -  no clubbing cyanosis or edema  Lymphatics - no palpable lymphadenopathy, no hepatosplenomegaly  Hematologic: no petechiae or purpura  Peripheral vascular -Femoral, Dorsalis pedis and posterior tibial pulses felt without difficulty  Skin - no rash or unusual mole change noted  Neurological - Cranial nerves II-XII grossly intact. Motor strength 5/5. DTR's 2+ and symmetric. Station and gait normal  Back exam - full range of motion, no tenderness, palpable spasm or pain on motion  Musculoskeletal - no joint tenderness, deformity or swelling      Assessment/Plan:     1. Annual physical exam    2. Mixed hyperlipidemia    3. Glucose intolerance    4. ASCVD (arteriosclerotic cardiovascular disease)    5. Vitamin D deficiency        Impression  1. Annual physical examination is normal  2. Hyperlipidemia prior lab reviewed repeat status pending I will adjust if needed. 3.  Glucose intolerance repeat status is pending  4. ASCVD clinically stable  5. Vitamin D deficiency repeat status pending  I will call with lab and make further recommendations or adjustments if necessary. Follow-up as scheduled again for 6 months or sooner if there is a problem. Health Maintenance reviewed - updated.     Orders Placed This Encounter    CBC WITH AUTOMATED DIFF    METABOLIC PANEL, COMPREHENSIVE    LIPID PANEL    CK    HEMOGLOBIN A1C WITH EAG    T4 (THYROXINE)    TSH 3RD GENERATION    URINALYSIS W/ REFLEX CULTURE    PSA SCREENING (SCREENING)    VITAMIN D, 25 HYDROXY    Repatha SureClick pen injection       Medications Discontinued During This Encounter   Medication Reason    ezetimibe (ZETIA) 10 mg tablet Therapy Completed    losartan (COZAAR) 25 mg tablet Therapy Completed    ticagrelor (BRILINTA) 90 mg tablet Therapy Completed       Current Outpatient Medications   Medication Sig Dispense Refill    Repatha SureClick pen injection PLEASE SEE ATTACHED FOR DETAILED DIRECTIONS      tamsulosin (Flomax) 0.4 mg capsule Take 1 Cap by mouth daily. 30 Cap prn    aspirin 81 mg chewable tablet Take 1 Tab by mouth daily. May resume in two days.  rosuvastatin (CRESTOR) 40 mg tablet Take 40 mg by mouth daily.  multivitamin (ONE A DAY) tablet Take 1 Tab by mouth daily. No results found for any visits on 09/01/21. Recommended healthy diet low in carbohydrates, fats, sodium and cholesterol. Recommended regular cardiovascular exercise 3-6 times per week for 30-60 minutes daily. Verbal and written instructions (see AVS) provided. Patient expresses understanding of diagnosis and treatment plan.       Michell German MD

## 2021-09-01 ENCOUNTER — OFFICE VISIT (OUTPATIENT)
Dept: INTERNAL MEDICINE CLINIC | Age: 66
End: 2021-09-01
Payer: COMMERCIAL

## 2021-09-01 VITALS
HEIGHT: 68 IN | TEMPERATURE: 97.7 F | WEIGHT: 151.9 LBS | RESPIRATION RATE: 16 BRPM | BODY MASS INDEX: 23.02 KG/M2 | OXYGEN SATURATION: 96 % | SYSTOLIC BLOOD PRESSURE: 115 MMHG | DIASTOLIC BLOOD PRESSURE: 76 MMHG | HEART RATE: 71 BPM

## 2021-09-01 DIAGNOSIS — E78.2 MIXED HYPERLIPIDEMIA: ICD-10-CM

## 2021-09-01 DIAGNOSIS — E55.9 VITAMIN D DEFICIENCY: ICD-10-CM

## 2021-09-01 DIAGNOSIS — I25.10 ASCVD (ARTERIOSCLEROTIC CARDIOVASCULAR DISEASE): ICD-10-CM

## 2021-09-01 DIAGNOSIS — E74.39 GLUCOSE INTOLERANCE: ICD-10-CM

## 2021-09-01 DIAGNOSIS — Z00.00 ANNUAL PHYSICAL EXAM: Primary | ICD-10-CM

## 2021-09-01 PROCEDURE — 99397 PER PM REEVAL EST PAT 65+ YR: CPT | Performed by: INTERNAL MEDICINE

## 2021-09-01 RX ORDER — EVOLOCUMAB 140 MG/ML
INJECTION, SOLUTION SUBCUTANEOUS
COMMUNITY
Start: 2021-07-06

## 2021-09-01 NOTE — PROGRESS NOTES
HIPAA verified by two patient identifiers. Prachi Leahy is a 73 y.o. male    Chief Complaint   Patient presents with    Annual Exam       Visit Vitals  /76 (BP 1 Location: Left upper arm, BP Patient Position: Sitting, BP Cuff Size: Adult)   Pulse 71   Temp 97.7 °F (36.5 °C) (Oral)   Resp 16   Ht 5' 8\" (1.727 m)   Wt 151 lb 14.4 oz (68.9 kg)   SpO2 96%   BMI 23.10 kg/m²       Pain Scale: 0 - No pain/10  Pain Location:       Health Maintenance Due   Topic Date Due    COVID-19 Vaccine (1) Never done    DTaP/Tdap/Td series (1 - Tdap) Never done    Shingrix Vaccine Age 50> (1 of 2) Never done    Pneumococcal 65+ years (1 of 1 - PPSV23) Never done    Flu Vaccine (1) Never done         Coordination of Care Questionnaire:  :   1) Have you been to an emergency room, urgent care, or hospitalized since your last visit? If yes, where when, and reason for visit? no       2. Have seen or consulted any other health care provider since your last visit? If yes, where when, and reason for visit? NO      Patient is accompanied by self I have received verbal consent from Prachi Leahy to discuss any/all medical information while they are present in the room.

## 2021-09-01 NOTE — PATIENT INSTRUCTIONS
Angina: Care Instructions  Your Care Instructions     You have a problem called angina. Angina happens when there is not enough blood flow to your heart muscle. Angina is a sign of coronary artery disease (CAD). CAD occurs when blood vessels that supply the heart become narrowed. Having CAD increases your risk of a heart attack. Chest pain or pressure is the most common symptom of angina. But some people have other symptoms, like:  · Pain, pressure, or a strange feeling in the back, neck, jaw, or upper belly, or in one or both shoulders or arms. · Shortness of breath. · Nausea or vomiting. · Lightheadedness or sudden weakness. · Fast or irregular heartbeat. Women are somewhat more likely than men to have angina symptoms like shortness of breath, nausea, and back or jaw pain. Angina can be dangerous. That's why it is important to pay attention to your symptoms. Know what is typical for you, learn how to control your symptoms, and understand when you need to get treatment. A change in your usual pattern of symptoms is an emergency. It may mean that you are having a heart attack. The doctor has checked you carefully, but problems can develop later. If you notice any problems or new symptoms, get medical treatment right away. Follow-up care is a key part of your treatment and safety. Be sure to make and go to all appointments, and call your doctor if you are having problems. It's also a good idea to know your test results and keep a list of the medicines you take. How can you care for yourself at home? Medicines    · If your doctor has given you nitroglycerin for angina symptoms, keep it with you at all times. If you have symptoms, sit down and rest, and take the first dose of nitroglycerin as directed. If your symptoms get worse or are not getting better within 5 minutes, call 911 right away. Stay on the phone.  The emergency  will give you further instructions.     · If your doctor advises it, take 1 low-dose aspirin a day to prevent heart attack.     · Be safe with medicines. Take your medicines exactly as prescribed. Call your doctor if you think you are having a problem with your medicine. You will get more details on the specific medicines your doctor prescribes. Lifestyle changes    · Do not smoke. If you need help quitting, talk to your doctor about stop-smoking programs and medicines. These can increase your chances of quitting for good.     · Eat a heart-healthy diet that is low in saturated fat and salt, and is high in fiber. Talk to your doctor or a dietitian about healthy eating.     · Stay at a healthy weight. Or lose weight if you need to. Activity    · Talk to your doctor about a level of activity that is safe for you.     · If an activity causes angina symptoms, stop and rest.   When should you call for help? Call 911 anytime you think you may need emergency care. For example, call if:    · You passed out (lost consciousness).     · You have symptoms of a heart attack. These may include:  ? Chest pain or pressure, or a strange feeling in the chest.  ? Sweating. ? Shortness of breath. ? Nausea or vomiting. ? Pain, pressure, or a strange feeling in the back, neck, jaw, or upper belly or in one or both shoulders or arms. ? Lightheadedness or sudden weakness. ? A fast or irregular heartbeat. After you call 911, the  may tell you to chew 1 adult-strength or 2 to 4 low-dose aspirin. Wait for an ambulance. Do not try to drive yourself.     · You have angina symptoms that do not go away with rest or are not getting better within 5 minutes after you take a dose of nitroglycerin. Call your doctor now if:    · Your angina symptoms seem worse but still follow your typical pattern. You can predict when symptoms will happen, but they may come on sooner, feel worse, or last longer.     · You feel dizzy or lightheaded, or you feel like you may faint.    Watch closely for changes in your health, and be sure to contact your doctor if you have any problems. Where can you learn more? Go to http://www.gray.com/  Enter H129 in the search box to learn more about \"Angina: Care Instructions. \"  Current as of: August 31, 2020               Content Version: 12.8  © 3952-5952 [employer]. Care instructions adapted under license by [employer] (which disclaims liability or warranty for this information). If you have questions about a medical condition or this instruction, always ask your healthcare professional. [address] any warranty or liability for your use of this information.

## 2021-09-02 LAB
25(OH)D3+25(OH)D2 SERPL-MCNC: 32.3 NG/ML (ref 30–100)
ALBUMIN SERPL-MCNC: 4.5 G/DL (ref 3.8–4.8)
ALBUMIN/GLOB SERPL: 1.7 {RATIO} (ref 1.2–2.2)
ALP SERPL-CCNC: 55 IU/L (ref 48–121)
ALT SERPL-CCNC: 18 IU/L (ref 0–44)
APPEARANCE UR: CLEAR
AST SERPL-CCNC: 24 IU/L (ref 0–40)
BACTERIA #/AREA URNS HPF: NORMAL /[HPF]
BASOPHILS # BLD AUTO: 0.1 X10E3/UL (ref 0–0.2)
BASOPHILS NFR BLD AUTO: 2 %
BILIRUB SERPL-MCNC: 0.7 MG/DL (ref 0–1.2)
BILIRUB UR QL STRIP: NEGATIVE
BUN SERPL-MCNC: 18 MG/DL (ref 8–27)
BUN/CREAT SERPL: 17 (ref 10–24)
CALCIUM SERPL-MCNC: 10.3 MG/DL (ref 8.6–10.2)
CASTS URNS QL MICRO: NORMAL /LPF
CHLORIDE SERPL-SCNC: 106 MMOL/L (ref 96–106)
CHOLEST SERPL-MCNC: 160 MG/DL (ref 100–199)
CK SERPL-CCNC: 149 U/L (ref 41–331)
CO2 SERPL-SCNC: 25 MMOL/L (ref 20–29)
COLOR UR: YELLOW
CREAT SERPL-MCNC: 1.04 MG/DL (ref 0.76–1.27)
EOSINOPHIL # BLD AUTO: 0.1 X10E3/UL (ref 0–0.4)
EOSINOPHIL NFR BLD AUTO: 1 %
EPI CELLS #/AREA URNS HPF: NORMAL /HPF (ref 0–10)
ERYTHROCYTE [DISTWIDTH] IN BLOOD BY AUTOMATED COUNT: 11.7 % (ref 11.6–15.4)
EST. AVERAGE GLUCOSE BLD GHB EST-MCNC: 120 MG/DL
GLOBULIN SER CALC-MCNC: 2.7 G/DL (ref 1.5–4.5)
GLUCOSE SERPL-MCNC: 106 MG/DL (ref 65–99)
GLUCOSE UR QL: NEGATIVE
HBA1C MFR BLD: 5.8 % (ref 4.8–5.6)
HCT VFR BLD AUTO: 47.5 % (ref 37.5–51)
HDLC SERPL-MCNC: 63 MG/DL
HGB BLD-MCNC: 15.9 G/DL (ref 13–17.7)
HGB UR QL STRIP: NEGATIVE
IMM GRANULOCYTES # BLD AUTO: 0 X10E3/UL (ref 0–0.1)
IMM GRANULOCYTES NFR BLD AUTO: 0 %
KETONES UR QL STRIP: NEGATIVE
LDLC SERPL CALC-MCNC: 84 MG/DL (ref 0–99)
LEUKOCYTE ESTERASE UR QL STRIP: NEGATIVE
LYMPHOCYTES # BLD AUTO: 1.4 X10E3/UL (ref 0.7–3.1)
LYMPHOCYTES NFR BLD AUTO: 25 %
MCH RBC QN AUTO: 32.2 PG (ref 26.6–33)
MCHC RBC AUTO-ENTMCNC: 33.5 G/DL (ref 31.5–35.7)
MCV RBC AUTO: 96 FL (ref 79–97)
MICRO URNS: NORMAL
MICRO URNS: NORMAL
MONOCYTES # BLD AUTO: 0.5 X10E3/UL (ref 0.1–0.9)
MONOCYTES NFR BLD AUTO: 10 %
NEUTROPHILS # BLD AUTO: 3.5 X10E3/UL (ref 1.4–7)
NEUTROPHILS NFR BLD AUTO: 62 %
NITRITE UR QL STRIP: NEGATIVE
PH UR STRIP: 7 [PH] (ref 5–7.5)
PLATELET # BLD AUTO: 269 X10E3/UL (ref 150–450)
POTASSIUM SERPL-SCNC: 5.5 MMOL/L (ref 3.5–5.2)
PROT SERPL-MCNC: 7.2 G/DL (ref 6–8.5)
PROT UR QL STRIP: NEGATIVE
PSA SERPL-MCNC: 4.7 NG/ML (ref 0–4)
RBC # BLD AUTO: 4.94 X10E6/UL (ref 4.14–5.8)
RBC #/AREA URNS HPF: NORMAL /HPF (ref 0–2)
SODIUM SERPL-SCNC: 147 MMOL/L (ref 134–144)
SP GR UR: 1.02 (ref 1–1.03)
T4 SERPL-MCNC: 6.8 UG/DL (ref 4.5–12)
TRIGL SERPL-MCNC: 67 MG/DL (ref 0–149)
TSH SERPL DL<=0.005 MIU/L-ACNC: 1.64 UIU/ML (ref 0.45–4.5)
URINALYSIS REFLEX, 377202: NORMAL
UROBILINOGEN UR STRIP-MCNC: 0.2 MG/DL (ref 0.2–1)
VLDLC SERPL CALC-MCNC: 13 MG/DL (ref 5–40)
WBC # BLD AUTO: 5.5 X10E3/UL (ref 3.4–10.8)
WBC #/AREA URNS HPF: NORMAL /HPF (ref 0–5)

## 2021-09-04 NOTE — PROGRESS NOTES
Blood sugar and glycohemoglobin remains borderline so continue to watch diet there. LDL cholesterol 84 is slightly above the goal of 70 which I have already discussed with him and he is going to take Zetia 10 mg every other day along what he is already taking. PSA is slightly elevated and he already has made an appointment Dr. Ernestine Gonzales'. Vitamin D is in the low normal range so would not hurt to start taking vitamin D at 1000 units daily. All the labs look fine.   I have already discussed the cholesterol and PSA with him

## 2021-09-07 NOTE — PROGRESS NOTES
Blood sugar and glycohemoglobin remains borderline so continue to watch diet there.  LDL cholesterol 84 is slightly above the goal of 70 which I have already discussed with him and he is going to take Zetia 10 mg every other day along what he is already taking.  PSA is slightly elevated and he already has made an appointment Dr. Hazel Butcher'.  Vitamin D is in the low normal range so would not hurt to start taking vitamin D at 1000 units daily.  All the labs look fine.  I have already discussed the cholesterol and PSA with him. Letter generated to patient regarding.

## 2021-09-30 PROBLEM — Z00.00 ANNUAL PHYSICAL EXAM: Status: RESOLVED | Noted: 2019-07-16 | Resolved: 2021-09-30

## 2022-03-01 NOTE — PROGRESS NOTES
Chief Complaint   Patient presents with    Cholesterol Problem     6 month    Blood sugar problem    Vitamin D Deficiency       SUBJECTIVE:    Ashley Hutchison is a 78 y.o. male returns in follow-up of his medical problems include hyperlipidemia, glucose intolerance, ASCVD, vitamin D deficiency, BPH and recent diagnosis of prostate cancer with 2 sites positive on biopsy for which he has been recommended to actively follow this with surveillance. He currently denies any chest pain, shortness of breath, palpitations, PND, orthopnea or cardiorespiratory complaints. He does continue to exercise on a regular basis. He denies any GI or  complaints. He notes no headaches, dizziness or neurologic complaints. There are no current active arthritic complaints and he has no other complaints on complete view of systems. Current Outpatient Medications   Medication Sig Dispense Refill    Repatha SureClick pen injection PLEASE SEE ATTACHED FOR DETAILED DIRECTIONS      tamsulosin (Flomax) 0.4 mg capsule Take 1 Cap by mouth daily. 30 Cap prn    aspirin 81 mg chewable tablet Take 1 Tab by mouth daily. May resume in two days.  rosuvastatin (CRESTOR) 40 mg tablet Take 40 mg by mouth daily.  multivitamin (ONE A DAY) tablet Take 1 Tab by mouth daily.  ezetimibe (ZETIA) 10 mg tablet Take 10 mg by mouth daily.        Past Medical History:   Diagnosis Date    CAD (coronary artery disease)     Hyperlipidemia      Past Surgical History:   Procedure Laterality Date    HX ANGIOPLASTY      HX APPENDECTOMY      HX HERNIA REPAIR      HX TONSILLECTOMY      HX WISDOM TEETH EXTRACTION       No Known Allergies    REVIEW OF SYSTEMS:  General: negative for - chills or fever, or weight loss or gain  ENT: negative for - headaches, nasal congestion or tinnitus  Eyes: no blurred or visual changes  Neck: No stiffness or swollen nodes  Respiratory: negative for - cough, hemoptysis, shortness of breath or wheezing  Cardiovascular : negative for - chest pain, edema, palpitations or shortness of breath  Gastrointestinal: negative for - abdominal pain, blood in stools, heartburn or nausea/vomiting  Genito-Urinary: no dysuria, trouble voiding, or hematuria  Musculoskeletal: negative for - gait disturbance, joint pain, joint stiffness or joint swelling  Neurological: no TIA or stroke symptoms  Hematologic: no bruises, no bleeding  Lymphatic: no swollen glands  Integument: no lumps, mole changes, nail changes or rash  Endocrine:no malaise/lethargy poly uria or polydipsia or unexpected weight changes        Social History     Socioeconomic History    Marital status:    Tobacco Use    Smoking status: Never Smoker    Smokeless tobacco: Never Used   Substance and Sexual Activity    Alcohol use: Yes     Comment: social    Drug use: No     History reviewed. No pertinent family history. OBJECTIVE:     Visit Vitals  /70 (BP 1 Location: Left upper arm, BP Patient Position: Sitting, BP Cuff Size: Adult)   Pulse 70   Temp 97.7 °F (36.5 °C) (Oral)   Resp 16   Ht 5' 8\" (1.727 m)   Wt 152 lb 9.6 oz (69.2 kg)   SpO2 98%   BMI 23.20 kg/m²     CONSTITUTIONAL:   well nourished, appears age appropriate  EYES: sclera anicteric, PERRL, EOMI  ENMT:nares clear, moist mucous membranes, pharynx clear  NECK: supple. Thyroid normal, No JVD or bruits  RESPIRATORY: Chest: clear to ascultation and percussion, normal inspiratory effort  CARDIOVASCULAR: Heart: regular rate and rhythm no murmurs, rubs or gallops, PMI not displaced, No thrills, no peripheral edema  GASTROINTESTINAL: Abdomen: non distended, soft, non tender, bowel sounds normal  HEMATOLOGIC: no purpura, petechiae or bruising  LYMPHATIC: No lymph node enlargemant  MUSCULOSKELETAL: Extremities: no active synovitis, pulse 1+   INTEGUMENT: No unusual rashes or suspicious skin lesions noted.  Nails appear normal.  PERIPHERAL VASCULAR: normal pulses femoral, PT and DP  NEUROLOGIC: non-focal exam, A & O X 3  PSYCHIATRIC:, appropriate affect     ASSESSMENT:   1. Mixed hyperlipidemia    2. Glucose intolerance    3. ASCVD (arteriosclerotic cardiovascular disease)    4. Benign prostatic hyperplasia with nocturia    5. Vitamin D deficiency    6. Prostate CA ([address]    7. Encounter for immunization    8. COVID-19      Impression  1. Hyperlipidemia we will see what that status is Crestor dose now decreased from 40-20 and still on Repatha  2. Glucose intolerance repeat status pending a prior lab reviewed and I will adjust if needed. 3   ASCVD clinically stable no recent symptoms  4. BPH currently asymptomatic on Flomax  5. Vitamin D deficiency repeat status is pending  6. Prostate cancer as noted biopsy-proven the second half of 2021. Immunization update today with Prevnar 13 and the first Shingrix shot. He is to return in 2 months to get his second Shingrix shot he will return in 6 months for his annual or sooner if there is a problem. I will call with lab results in interim. PLAN:  .  Orders Placed This Encounter    Pneumococcal conjugate 13 valent, IM (PREVNAR-13)    Zoster VACC Recominant Adjuvanted (Shingrix)    METABOLIC PANEL, COMPREHENSIVE    LIPID PANEL    CK    VITAMIN D, 25 HYDROXY    HEMOGLOBIN A1C WITH EAG    PSA SCREENING (SCREENING)    ezetimibe (ZETIA) 10 mg tablet         ATTENTION:   This medical record was transcribed using an electronic medical records system. Although proofread, it may and can contain electronic and spelling errors. Other human spelling and other errors may be present. Corrections may be executed at a later time. Please feel free to contact us for any clarifications as needed. Follow-up and Dispositions    · Return in about 6 months (around 9/2/2022). Follow-up and Disposition History         No results found for any visits on 03/02/22.     Vi Mojica MD    The patient verbalized understanding of the problems and plans as explained.

## 2022-03-02 ENCOUNTER — OFFICE VISIT (OUTPATIENT)
Dept: INTERNAL MEDICINE CLINIC | Age: 67
End: 2022-03-02
Payer: COMMERCIAL

## 2022-03-02 VITALS
BODY MASS INDEX: 23.13 KG/M2 | DIASTOLIC BLOOD PRESSURE: 70 MMHG | RESPIRATION RATE: 16 BRPM | WEIGHT: 152.6 LBS | TEMPERATURE: 97.7 F | SYSTOLIC BLOOD PRESSURE: 108 MMHG | HEIGHT: 68 IN | OXYGEN SATURATION: 98 % | HEART RATE: 70 BPM

## 2022-03-02 DIAGNOSIS — N40.1 BENIGN PROSTATIC HYPERPLASIA WITH NOCTURIA: ICD-10-CM

## 2022-03-02 DIAGNOSIS — E55.9 VITAMIN D DEFICIENCY: ICD-10-CM

## 2022-03-02 DIAGNOSIS — E74.39 GLUCOSE INTOLERANCE: ICD-10-CM

## 2022-03-02 DIAGNOSIS — Z23 ENCOUNTER FOR IMMUNIZATION: ICD-10-CM

## 2022-03-02 DIAGNOSIS — I25.10 ASCVD (ARTERIOSCLEROTIC CARDIOVASCULAR DISEASE): ICD-10-CM

## 2022-03-02 DIAGNOSIS — R35.1 BENIGN PROSTATIC HYPERPLASIA WITH NOCTURIA: ICD-10-CM

## 2022-03-02 DIAGNOSIS — E78.2 MIXED HYPERLIPIDEMIA: Primary | ICD-10-CM

## 2022-03-02 DIAGNOSIS — U07.1 COVID-19: ICD-10-CM

## 2022-03-02 DIAGNOSIS — C61 PROSTATE CA (HCC): ICD-10-CM

## 2022-03-02 PROCEDURE — 90750 HZV VACC RECOMBINANT IM: CPT | Performed by: INTERNAL MEDICINE

## 2022-03-02 PROCEDURE — 90471 IMMUNIZATION ADMIN: CPT | Performed by: INTERNAL MEDICINE

## 2022-03-02 PROCEDURE — 99214 OFFICE O/P EST MOD 30 MIN: CPT | Performed by: INTERNAL MEDICINE

## 2022-03-02 PROCEDURE — 90670 PCV13 VACCINE IM: CPT | Performed by: INTERNAL MEDICINE

## 2022-03-02 PROCEDURE — 90472 IMMUNIZATION ADMIN EACH ADD: CPT | Performed by: INTERNAL MEDICINE

## 2022-03-02 RX ORDER — EZETIMIBE 10 MG/1
10 TABLET ORAL DAILY
COMMUNITY
Start: 2022-01-19

## 2022-03-02 NOTE — PROGRESS NOTES
After obtaining written consent and per orders of Dr. Alban Riley, injection of prevar 13 and Shngrix vaccine given by Catarino Barragan RN, CMA. Order and injection/medication verified by Ronaldo Myers. Patient tolerated procedure well. VIS was given to them. No reactions noted.

## 2022-03-02 NOTE — PROGRESS NOTES
HIPAA verified by two patient identifiers. Micaela Waller is a 78 y.o. male    Chief Complaint   Patient presents with    Cholesterol Problem     6 month    Blood sugar problem    Vitamin D Deficiency       Visit Vitals  /70 (BP 1 Location: Left upper arm, BP Patient Position: Sitting, BP Cuff Size: Adult)   Pulse 70   Temp 97.7 °F (36.5 °C) (Oral)   Resp 16   Ht 5' 8\" (1.727 m)   Wt 152 lb 9.6 oz (69.2 kg)   SpO2 98%   BMI 23.20 kg/m²       Pain Scale: 0 - No pain/10  Pain Location:       Health Maintenance Due   Topic Date Due    COVID-19 Vaccine (1) Never done    DTaP/Tdap/Td series (1 - Tdap) Never done    Shingrix Vaccine Age 50> (1 of 2) Never done    Pneumococcal 65+ years (1 of 1 - PPSV23) Never done    Flu Vaccine (1) Never done         Coordination of Care Questionnaire:  :   1) Have you been to an emergency room, urgent care, or hospitalized since your last visit? If yes, where when, and reason for visit? no       2. Have seen or consulted any other health care provider since your last visit? If yes, where when, and reason for visit? NO      Patient is accompanied by self I have received verbal consent from Micaela Waller to discuss any/all medical information while they are present in the room.

## 2022-03-02 NOTE — PATIENT INSTRUCTIONS
Benign Prostatic Hyperplasia: Care Instructions  Your Care Instructions     Benign prostatic hyperplasia, or BPH, is an enlarged prostate gland. The prostate is a small gland that makes some of the fluid in semen. Prostate enlargement happens to almost all men as they age. It is usually not serious. BPH does not cause prostate cancer. As the prostate gets bigger, it may partly block the flow of urine. You may have a hard time getting a urine stream started or completely stopped. You may have a weak urine stream, or you may have to urinate more often than you used to, especially at night. Most men find these problems easy to manage. You do not need treatment unless your symptoms bother you a lot or you have other problems, such as bladder infections or stones. In these cases, medicines may help. Surgery is not needed unless the urine flow is blocked or the symptoms do not get better with medicine. Follow-up care is a key part of your treatment and safety. Be sure to make and go to all appointments, and call your doctor if you are having problems. It's also a good idea to know your test results and keep a list of the medicines you take. How can you care for yourself at home? · Urinate as much as you can, relax for a few moments, and then try to urinate again. · Sit on the toilet to urinate. · Avoid caffeine and alcohol. These drinks will increase how often you need to urinate. · Many over-the-counter cold and allergy medicines can make the symptoms of BPH worse. Avoid antihistamines, decongestants, and allergy pills, if you can. Read the warnings on the package. · If you take any prescription medicines such as muscle relaxants, pain medicines, or medicines for depression or anxiety, ask your doctor or pharmacist if they can cause urination problems. When should you call for help?    Call your doctor now or seek immediate medical care if:    · You cannot urinate at all.     · You have symptoms of a urinary infection. For example:  ? You have blood or pus in your urine. ? You have pain in your back just below your rib cage. This is called flank pain. ? You have a fever, chills, or body aches. ? It hurts to urinate. ? You have groin or belly pain. Watch closely for changes in your health, and be sure to contact your doctor if:    · It hurts when you ejaculate.     · Your urinary problems get a lot worse or bother you a lot. Where can you learn more? Go to http://www.gray.com/  Enter K317 in the search box to learn more about \"Benign Prostatic Hyperplasia: Care Instructions. \"  Current as of: February 10, 2021               Content Version: 13.0  © 2006-2021 [employer]. Care instructions adapted under license by [employer] (which disclaims liability or warranty for this information). If you have questions about a medical condition or this instruction, always ask your healthcare professional. [place] [address] any warranty or liability for your use of this information.

## 2022-03-03 LAB
25(OH)D3+25(OH)D2 SERPL-MCNC: 25.9 NG/ML (ref 30–100)
ALBUMIN SERPL-MCNC: 4.7 G/DL (ref 3.8–4.8)
ALBUMIN/GLOB SERPL: 2.2 {RATIO} (ref 1.2–2.2)
ALP SERPL-CCNC: 51 IU/L (ref 44–121)
ALT SERPL-CCNC: 27 IU/L (ref 0–44)
AST SERPL-CCNC: 27 IU/L (ref 0–40)
BILIRUB SERPL-MCNC: 0.8 MG/DL (ref 0–1.2)
BUN SERPL-MCNC: 19 MG/DL (ref 8–27)
BUN/CREAT SERPL: 17 (ref 10–24)
CALCIUM SERPL-MCNC: 9.8 MG/DL (ref 8.6–10.2)
CHLORIDE SERPL-SCNC: 103 MMOL/L (ref 96–106)
CHOLEST SERPL-MCNC: 106 MG/DL (ref 100–199)
CK SERPL-CCNC: 214 U/L (ref 41–331)
CO2 SERPL-SCNC: 23 MMOL/L (ref 20–29)
CREAT SERPL-MCNC: 1.11 MG/DL (ref 0.76–1.27)
EGFR: 73 ML/MIN/1.73
EST. AVERAGE GLUCOSE BLD GHB EST-MCNC: 123 MG/DL
GLOBULIN SER CALC-MCNC: 2.1 G/DL (ref 1.5–4.5)
GLUCOSE SERPL-MCNC: 106 MG/DL (ref 65–99)
HBA1C MFR BLD: 5.9 % (ref 4.8–5.6)
HDLC SERPL-MCNC: 61 MG/DL
LDLC SERPL CALC-MCNC: 34 MG/DL (ref 0–99)
POTASSIUM SERPL-SCNC: 5.1 MMOL/L (ref 3.5–5.2)
PROT SERPL-MCNC: 6.8 G/DL (ref 6–8.5)
PSA SERPL-MCNC: 4.9 NG/ML (ref 0–4)
SODIUM SERPL-SCNC: 140 MMOL/L (ref 134–144)
TRIGL SERPL-MCNC: 41 MG/DL (ref 0–149)
VLDLC SERPL CALC-MCNC: 11 MG/DL (ref 5–40)

## 2022-03-03 NOTE — PROGRESS NOTES
PSA marginally higher at 4.9. Copy to Dr. Ade Briones' his urologist.  Lipid profile is excellent. Vitamin D is low so start 1000 units daily.   Glycohemoglobin 5.9 still in the borderline range so okay

## 2022-03-08 NOTE — PROGRESS NOTES
PSA marginally higher at 4.9. Copy to Dr. Benjie Rea' his urologist.  Lipid profile is excellent. Vitamin D is low so start 1000 units daily. Glycohemoglobin 5.9 still in the borderline range so okay. Copy of lab work mailed to patient. Copy of PSA faxed to Dr. Benjie Rea.

## 2022-03-18 PROBLEM — E74.39 GLUCOSE INTOLERANCE: Status: ACTIVE | Noted: 2020-08-18

## 2022-03-18 PROBLEM — R53.83 FATIGUE: Status: ACTIVE | Noted: 2019-07-17

## 2022-03-19 PROBLEM — C61 PROSTATE CA (HCC): Status: ACTIVE | Noted: 2022-03-02

## 2022-03-19 PROBLEM — M54.59 INTRACTABLE LOW BACK PAIN: Status: ACTIVE | Noted: 2018-05-18

## 2022-03-19 PROBLEM — R35.1 BENIGN PROSTATIC HYPERPLASIA WITH NOCTURIA: Status: ACTIVE | Noted: 2020-08-19

## 2022-03-19 PROBLEM — E55.9 VITAMIN D DEFICIENCY: Status: ACTIVE | Noted: 2019-07-17

## 2022-03-19 PROBLEM — I25.10 ASCVD (ARTERIOSCLEROTIC CARDIOVASCULAR DISEASE): Status: ACTIVE | Noted: 2019-07-16

## 2022-03-19 PROBLEM — N40.1 BENIGN PROSTATIC HYPERPLASIA WITH NOCTURIA: Status: ACTIVE | Noted: 2020-08-19

## 2022-03-19 PROBLEM — U07.1 COVID-19: Status: ACTIVE | Noted: 2022-03-02

## 2022-03-20 PROBLEM — E78.2 MIXED HYPERLIPIDEMIA: Status: ACTIVE | Noted: 2019-07-16

## 2022-03-20 PROBLEM — M51.26 LUMBAR DISC HERNIATION: Status: ACTIVE | Noted: 2018-05-18

## 2022-05-25 ENCOUNTER — CLINICAL SUPPORT (OUTPATIENT)
Dept: INTERNAL MEDICINE CLINIC | Age: 67
End: 2022-05-25
Payer: COMMERCIAL

## 2022-05-25 VITALS
HEIGHT: 68 IN | TEMPERATURE: 97.8 F | HEART RATE: 67 BPM | RESPIRATION RATE: 16 BRPM | BODY MASS INDEX: 23.75 KG/M2 | DIASTOLIC BLOOD PRESSURE: 78 MMHG | OXYGEN SATURATION: 98 % | WEIGHT: 156.7 LBS | SYSTOLIC BLOOD PRESSURE: 132 MMHG

## 2022-05-25 DIAGNOSIS — Z23 ENCOUNTER FOR IMMUNIZATION: Primary | ICD-10-CM

## 2022-05-25 PROCEDURE — 90471 IMMUNIZATION ADMIN: CPT | Performed by: INTERNAL MEDICINE

## 2022-05-25 PROCEDURE — 90750 HZV VACC RECOMBINANT IM: CPT | Performed by: INTERNAL MEDICINE

## 2022-05-25 NOTE — PROGRESS NOTES
Chief Complaint   Patient presents with    Immunization/Injection     2nd shingrix vaccine     Visit Vitals  /78 (BP 1 Location: Left upper arm, BP Patient Position: Sitting, BP Cuff Size: Adult)   Pulse 67   Temp 97.8 °F (36.6 °C) (Oral)   Resp 16   Ht 5' 8\" (1.727 m)   Wt 156 lb 11.2 oz (71.1 kg)   SpO2 98%   BMI 23.83 kg/m²     After obtaining consent, and per orders of Dr. Qing Washington, injection of Shingrix 0.5 mL was given IM in left deltoid by Jessica Carr LPN. Vaccine/medication was verified by Daija Alonzo. This was Part II of Shingrix vaccine. Patient instructed to remain in clinic for 20 minutes afterwards, and to report any adverse reaction to me immediately.

## 2022-09-10 NOTE — PROGRESS NOTES
Chief Complaint   Patient presents with    Cholesterol Problem     6 month follow up       SUBJECTIVE:    Ida Kirkland is a 78 y.o. male who returns in follow-up his medical problems include ASCVD status post stent on several occasions, hyperlipidemia, prostate cancer being treated with active surveillance following, glucose intolerance and other multiple medical problems. He is taking his medications trying to follow his diet try and remain physically active. He is riding his bike quite a lot. He has been able to get his weight down he says not eating as many snacks. He denies any chest pain, shortness of breath or cardiac respiratory complaints. He notes no GI or  complaints. He has no headaches, dizziness or neurologic complaints. He has no current active arthritic complaints and there are no other complaints on complete view of systems. Current Outpatient Medications   Medication Sig Dispense Refill    cholecalciferol (Vitamin D3) 25 mcg (1,000 unit) cap Take 1,000 Units by mouth daily. ezetimibe (ZETIA) 10 mg tablet Take 10 mg by mouth daily. Repatha SureClick pen injection PLEASE SEE ATTACHED FOR DETAILED DIRECTIONS      tamsulosin (Flomax) 0.4 mg capsule Take 1 Cap by mouth daily. 30 Cap prn    aspirin 81 mg chewable tablet Take 1 Tab by mouth daily. May resume in two days. rosuvastatin (CRESTOR) 40 mg tablet Take 40 mg by mouth daily. multivitamin (ONE A DAY) tablet Take 1 Tab by mouth daily.        Past Medical History:   Diagnosis Date    CAD (coronary artery disease)     Hyperlipidemia      Past Surgical History:   Procedure Laterality Date    HX ANGIOPLASTY      HX APPENDECTOMY      HX HERNIA REPAIR      HX TONSILLECTOMY      HX WISDOM TEETH EXTRACTION       No Known Allergies    REVIEW OF SYSTEMS:  General: negative for - chills or fever, or weight loss or gain  ENT: negative for - headaches, nasal congestion or tinnitus  Eyes: no blurred or visual changes  Neck: No stiffness or swollen nodes  Respiratory: negative for - cough, hemoptysis, shortness of breath or wheezing  Cardiovascular : negative for - chest pain, edema, palpitations or shortness of breath  Gastrointestinal: negative for - abdominal pain, blood in stools, heartburn or nausea/vomiting  Genito-Urinary: no dysuria, trouble voiding, or hematuria  Musculoskeletal: negative for - gait disturbance, joint pain, joint stiffness or joint swelling  Neurological: no TIA or stroke symptoms  Hematologic: no bruises, no bleeding  Lymphatic: no swollen glands  Integument: no lumps, mole changes, nail changes or rash  Endocrine:no malaise/lethargy poly uria or polydipsia or unexpected weight changes        Social History     Socioeconomic History    Marital status:    Tobacco Use    Smoking status: Never    Smokeless tobacco: Never   Vaping Use    Vaping Use: Never used   Substance and Sexual Activity    Alcohol use: Yes     Comment: social    Drug use: No     History reviewed. No pertinent family history. OBJECTIVE:     Visit Vitals  /82 (BP 1 Location: Left upper arm, BP Patient Position: Sitting, BP Cuff Size: Adult)   Pulse 67   Temp 97.7 °F (36.5 °C) (Oral)   Resp 16   Ht 5' 8\" (1.727 m)   Wt 147 lb 6.4 oz (66.9 kg)   SpO2 97%   BMI 22.41 kg/m²     CONSTITUTIONAL:   well nourished, appears age appropriate  EYES: sclera anicteric, PERRL, EOMI  ENMT:nares clear, moist mucous membranes, pharynx clear  NECK: supple.  Thyroid normal, No JVD or bruits  RESPIRATORY: Chest: clear to ascultation and percussion, normal inspiratory effort  CARDIOVASCULAR: Heart: regular rate and rhythm no murmurs, rubs or gallops, PMI not displaced, No thrills, no peripheral edema  GASTROINTESTINAL: Abdomen: non distended, soft, non tender, bowel sounds normal  HEMATOLOGIC: no purpura, petechiae or bruising  LYMPHATIC: No lymph node enlargemant  MUSCULOSKELETAL: Extremities: no active synovitis, pulse 1+ INTEGUMENT: No unusual rashes or suspicious skin lesions noted. Nails appear normal.  PERIPHERAL VASCULAR: normal pulses femoral, PT and DP  NEUROLOGIC: non-focal exam, A & O X 3  PSYCHIATRIC:, appropriate affect     ASSESSMENT:   1. ASCVD (arteriosclerotic cardiovascular disease)    2. Glucose intolerance    3. Mixed hyperlipidemia    4. Prostate CA (HCC)      Impression  1. ASCVD clinically stable continue aspirin daily  2. Glucose intolerance repeat status pending  3. Hyperlipidemia prior lab reviewed repeat status pending next #4 prostate cancer repeat PSA pending  Follow-up again in 6 months or sooner if there is a problem. I will call with today's lab results. PLAN:  .  Orders Placed This Encounter    METABOLIC PANEL, COMPREHENSIVE    LIPID PANEL    CK    HEMOGLOBIN A1C WITH EAG    PSA SCREENING (SCREENING)    cholecalciferol (Vitamin D3) 25 mcg (1,000 unit) cap         ATTENTION:   This medical record was transcribed using an electronic medical records system. Although proofread, it may and can contain electronic and spelling errors. Other human spelling and other errors may be present. Corrections may be executed at a later time. Please feel free to contact us for any clarifications as needed. Follow-up and Dispositions    Return in about 6 months (around 3/12/2023). No results found for any visits on 09/12/22. Amrit Post MD    The patient verbalized understanding of the problems and plans as explained.

## 2022-09-12 ENCOUNTER — OFFICE VISIT (OUTPATIENT)
Dept: INTERNAL MEDICINE CLINIC | Age: 67
End: 2022-09-12
Payer: COMMERCIAL

## 2022-09-12 VITALS
BODY MASS INDEX: 22.34 KG/M2 | TEMPERATURE: 97.7 F | SYSTOLIC BLOOD PRESSURE: 128 MMHG | DIASTOLIC BLOOD PRESSURE: 82 MMHG | WEIGHT: 147.4 LBS | RESPIRATION RATE: 16 BRPM | HEIGHT: 68 IN | HEART RATE: 67 BPM | OXYGEN SATURATION: 97 %

## 2022-09-12 DIAGNOSIS — C61 PROSTATE CA (HCC): ICD-10-CM

## 2022-09-12 DIAGNOSIS — E74.39 GLUCOSE INTOLERANCE: ICD-10-CM

## 2022-09-12 DIAGNOSIS — E78.2 MIXED HYPERLIPIDEMIA: ICD-10-CM

## 2022-09-12 DIAGNOSIS — I25.10 ASCVD (ARTERIOSCLEROTIC CARDIOVASCULAR DISEASE): Primary | ICD-10-CM

## 2022-09-12 PROCEDURE — 99214 OFFICE O/P EST MOD 30 MIN: CPT | Performed by: INTERNAL MEDICINE

## 2022-09-12 PROCEDURE — 1123F ACP DISCUSS/DSCN MKR DOCD: CPT | Performed by: INTERNAL MEDICINE

## 2022-09-12 RX ORDER — GLUCOSAMINE SULFATE 1500 MG
1000 POWDER IN PACKET (EA) ORAL DAILY
COMMUNITY

## 2022-09-13 LAB
ALBUMIN SERPL-MCNC: 4.7 G/DL (ref 3.8–4.8)
ALBUMIN/GLOB SERPL: 2.6 {RATIO} (ref 1.2–2.2)
ALP SERPL-CCNC: 53 IU/L (ref 44–121)
ALT SERPL-CCNC: 24 IU/L (ref 0–44)
AST SERPL-CCNC: 26 IU/L (ref 0–40)
BILIRUB SERPL-MCNC: 0.9 MG/DL (ref 0–1.2)
BUN SERPL-MCNC: 18 MG/DL (ref 8–27)
BUN/CREAT SERPL: 20 (ref 10–24)
CALCIUM SERPL-MCNC: 9.7 MG/DL (ref 8.6–10.2)
CHLORIDE SERPL-SCNC: 101 MMOL/L (ref 96–106)
CHOLEST SERPL-MCNC: 101 MG/DL (ref 100–199)
CK SERPL-CCNC: 220 U/L (ref 41–331)
CO2 SERPL-SCNC: 25 MMOL/L (ref 20–29)
CREAT SERPL-MCNC: 0.9 MG/DL (ref 0.76–1.27)
EGFR: 94 ML/MIN/1.73
EST. AVERAGE GLUCOSE BLD GHB EST-MCNC: 123 MG/DL
GLOBULIN SER CALC-MCNC: 1.8 G/DL (ref 1.5–4.5)
GLUCOSE SERPL-MCNC: 108 MG/DL (ref 65–99)
HBA1C MFR BLD: 5.9 % (ref 4.8–5.6)
HDLC SERPL-MCNC: 68 MG/DL
LDLC SERPL CALC-MCNC: 20 MG/DL (ref 0–99)
POTASSIUM SERPL-SCNC: 4.9 MMOL/L (ref 3.5–5.2)
PROT SERPL-MCNC: 6.5 G/DL (ref 6–8.5)
PSA SERPL-MCNC: 4.9 NG/ML (ref 0–4)
SODIUM SERPL-SCNC: 138 MMOL/L (ref 134–144)
TRIGL SERPL-MCNC: 54 MG/DL (ref 0–149)
VLDLC SERPL CALC-MCNC: 13 MG/DL (ref 5–40)

## 2023-05-20 RX ORDER — EVOLOCUMAB 140 MG/ML
INJECTION, SOLUTION SUBCUTANEOUS
COMMUNITY
Start: 2021-07-06

## 2023-05-20 RX ORDER — ASPIRIN 81 MG/1
81 TABLET, CHEWABLE ORAL DAILY
COMMUNITY
Start: 2018-05-22

## 2023-05-20 RX ORDER — ROSUVASTATIN CALCIUM 40 MG/1
40 TABLET, COATED ORAL DAILY
COMMUNITY

## 2023-05-20 RX ORDER — EZETIMIBE 10 MG/1
10 TABLET ORAL DAILY
COMMUNITY
Start: 2022-01-19

## 2023-05-20 RX ORDER — TAMSULOSIN HYDROCHLORIDE 0.4 MG/1
0.4 CAPSULE ORAL DAILY
COMMUNITY
Start: 2020-08-19

## 2023-07-10 ENCOUNTER — HOSPITAL ENCOUNTER (OUTPATIENT)
Facility: HOSPITAL | Age: 68
Discharge: HOME | End: 2023-07-13
Attending: ORTHOPAEDIC SURGERY
Payer: MEDICARE

## 2023-07-10 VITALS — WEIGHT: 140 LBS | BODY MASS INDEX: 21.29 KG/M2

## 2023-07-10 DIAGNOSIS — S83.241A TEAR OF MEDIAL MENISCUS OF RIGHT KNEE, UNSPECIFIED TEAR TYPE, UNSPECIFIED WHETHER OLD OR CURRENT TEAR, INITIAL ENCOUNTER: ICD-10-CM

## 2023-07-10 PROCEDURE — 73721 MRI JNT OF LWR EXTRE W/O DYE: CPT

## 2023-09-20 PROBLEM — I25.10 ASCVD (ARTERIOSCLEROTIC CARDIOVASCULAR DISEASE): Status: ACTIVE | Noted: 2019-07-16

## 2023-09-20 PROBLEM — Z13.39 ALCOHOL SCREENING: Status: ACTIVE | Noted: 2023-09-20

## 2023-09-20 PROBLEM — M51.26 LUMBAR DISC HERNIATION: Status: ACTIVE | Noted: 2018-05-18

## 2023-09-20 PROBLEM — R35.1 BENIGN PROSTATIC HYPERPLASIA WITH NOCTURIA: Status: ACTIVE | Noted: 2020-08-19

## 2023-09-20 PROBLEM — E78.2 MIXED HYPERLIPIDEMIA: Status: ACTIVE | Noted: 2019-07-16

## 2023-09-20 PROBLEM — E55.9 VITAMIN D DEFICIENCY: Status: ACTIVE | Noted: 2019-07-17

## 2023-09-20 PROBLEM — E74.39 GLUCOSE INTOLERANCE: Status: ACTIVE | Noted: 2020-08-18

## 2023-09-20 PROBLEM — Z00.00 WELCOME TO MEDICARE PREVENTIVE VISIT: Status: ACTIVE | Noted: 2023-09-20

## 2023-09-20 PROBLEM — N40.1 BENIGN PROSTATIC HYPERPLASIA WITH NOCTURIA: Status: ACTIVE | Noted: 2020-08-19

## 2023-09-20 PROBLEM — C61 PROSTATE CA (HCC): Status: ACTIVE | Noted: 2022-03-02

## 2023-09-20 SDOH — HEALTH STABILITY: PHYSICAL HEALTH: ON AVERAGE, HOW MANY MINUTES DO YOU ENGAGE IN EXERCISE AT THIS LEVEL?: 100 MIN

## 2023-09-20 SDOH — ECONOMIC STABILITY: HOUSING INSECURITY
IN THE LAST 12 MONTHS, WAS THERE A TIME WHEN YOU DID NOT HAVE A STEADY PLACE TO SLEEP OR SLEPT IN A SHELTER (INCLUDING NOW)?: NO

## 2023-09-20 SDOH — HEALTH STABILITY: PHYSICAL HEALTH: ON AVERAGE, HOW MANY DAYS PER WEEK DO YOU ENGAGE IN MODERATE TO STRENUOUS EXERCISE (LIKE A BRISK WALK)?: 6 DAYS

## 2023-09-20 SDOH — ECONOMIC STABILITY: TRANSPORTATION INSECURITY
IN THE PAST 12 MONTHS, HAS LACK OF TRANSPORTATION KEPT YOU FROM MEETINGS, WORK, OR FROM GETTING THINGS NEEDED FOR DAILY LIVING?: NO

## 2023-09-20 SDOH — ECONOMIC STABILITY: FOOD INSECURITY: WITHIN THE PAST 12 MONTHS, THE FOOD YOU BOUGHT JUST DIDN'T LAST AND YOU DIDN'T HAVE MONEY TO GET MORE.: NEVER TRUE

## 2023-09-20 SDOH — ECONOMIC STABILITY: INCOME INSECURITY: HOW HARD IS IT FOR YOU TO PAY FOR THE VERY BASICS LIKE FOOD, HOUSING, MEDICAL CARE, AND HEATING?: NOT HARD AT ALL

## 2023-09-20 SDOH — ECONOMIC STABILITY: FOOD INSECURITY: WITHIN THE PAST 12 MONTHS, YOU WORRIED THAT YOUR FOOD WOULD RUN OUT BEFORE YOU GOT MONEY TO BUY MORE.: NEVER TRUE

## 2023-09-20 ASSESSMENT — LIFESTYLE VARIABLES
HOW OFTEN DO YOU HAVE SIX OR MORE DRINKS ON ONE OCCASION: 1
HOW OFTEN DO YOU HAVE A DRINK CONTAINING ALCOHOL: 3
HOW MANY STANDARD DRINKS CONTAINING ALCOHOL DO YOU HAVE ON A TYPICAL DAY: 1
HOW OFTEN DO YOU HAVE A DRINK CONTAINING ALCOHOL: 2-4 TIMES A MONTH
HOW MANY STANDARD DRINKS CONTAINING ALCOHOL DO YOU HAVE ON A TYPICAL DAY: 1 OR 2

## 2023-09-20 ASSESSMENT — PATIENT HEALTH QUESTIONNAIRE - PHQ9
SUM OF ALL RESPONSES TO PHQ QUESTIONS 1-9: 0
2. FEELING DOWN, DEPRESSED OR HOPELESS: 0
SUM OF ALL RESPONSES TO PHQ QUESTIONS 1-9: 0
1. LITTLE INTEREST OR PLEASURE IN DOING THINGS: 0
SUM OF ALL RESPONSES TO PHQ9 QUESTIONS 1 & 2: 0

## 2023-09-21 ENCOUNTER — OFFICE VISIT (OUTPATIENT)
Facility: CLINIC | Age: 68
End: 2023-09-21

## 2023-09-21 VITALS
HEART RATE: 69 BPM | RESPIRATION RATE: 18 BRPM | HEIGHT: 68 IN | WEIGHT: 148.2 LBS | BODY MASS INDEX: 22.46 KG/M2 | OXYGEN SATURATION: 99 % | DIASTOLIC BLOOD PRESSURE: 82 MMHG | SYSTOLIC BLOOD PRESSURE: 122 MMHG | TEMPERATURE: 97.6 F

## 2023-09-21 DIAGNOSIS — M51.26 LUMBAR DISC HERNIATION: ICD-10-CM

## 2023-09-21 DIAGNOSIS — Z00.00 WELCOME TO MEDICARE PREVENTIVE VISIT: ICD-10-CM

## 2023-09-21 DIAGNOSIS — E55.9 VITAMIN D DEFICIENCY: ICD-10-CM

## 2023-09-21 DIAGNOSIS — Z13.39 ALCOHOL SCREENING: ICD-10-CM

## 2023-09-21 DIAGNOSIS — R35.1 BENIGN PROSTATIC HYPERPLASIA WITH NOCTURIA: ICD-10-CM

## 2023-09-21 DIAGNOSIS — N40.1 BENIGN PROSTATIC HYPERPLASIA WITH NOCTURIA: ICD-10-CM

## 2023-09-21 DIAGNOSIS — E74.39 GLUCOSE INTOLERANCE: ICD-10-CM

## 2023-09-21 DIAGNOSIS — Z01.818 PRE-OP EXAMINATION: ICD-10-CM

## 2023-09-21 DIAGNOSIS — E78.2 MIXED HYPERLIPIDEMIA: Primary | ICD-10-CM

## 2023-09-21 DIAGNOSIS — C61 PROSTATE CA (HCC): ICD-10-CM

## 2023-09-21 DIAGNOSIS — I25.10 ASCVD (ARTERIOSCLEROTIC CARDIOVASCULAR DISEASE): ICD-10-CM

## 2023-09-21 LAB
INR PPP: 1 (ref 0.9–1.1)
PROTHROMBIN TIME: 10.8 SEC (ref 9–11.1)

## 2023-09-21 NOTE — PROGRESS NOTES
Medicare Annual Wellness Visit    John Metzger is here for Medicare AWV    Assessment & Plan   Mixed hyperlipidemia  -     TSH; Future  -     T4, Free; Future  -     Lipid Panel; Future  -     CK; Future  -     Comprehensive Metabolic Panel; Future  Glucose intolerance  -     Hemoglobin A1C; Future  ASCVD (arteriosclerotic cardiovascular disease)  -     Urinalysis with Microscopic; Future  -     CBC with Auto Differential; Future  -     EKG 12 Lead; Future  Benign prostatic hyperplasia with nocturia  -     Urinalysis with Microscopic; Future  Lumbar disc herniation  Prostate CA ([address])  -     Urinalysis with Microscopic; Future  -     PSA Screening; Future  Vitamin D deficiency  -     Vitamin D 25 Hydroxy; Future  -     [facility] ANNUAL ALCOHOL SCREEN 15 MIN  Alcohol screening  Welcome to Medicare preventive visit  Pre-op examination  -     Protime-INR; Future    ASSESSMENT:   1. Mixed hyperlipidemia    2. Glucose intolerance    3. ASCVD (arteriosclerotic cardiovascular disease)    4. Benign prostatic hyperplasia with nocturia    5. Lumbar disc herniation    6. Prostate CA ([address])    7. Vitamin D deficiency    8. Alcohol screening    9. Welcome to Medicare preventive visit    10. Pre-op examination      Impression  1. Hyperlipidemia prior lab was reviewed and repeat status is pending he is currently on Repatha and Crestor 20 mg daily we will see what the numbers look like. 2.  Glucose intolerance we will see what the A1c looks like. Prior numbers reviewed. I will adjust treatment if necessary. 3   ASCVD clinically stable with extensive exercise and such as biking 60 miles at high altitude has had no chest pain. EKG obtained today normal sinus rhythm no acute process. 4.  BPH currently on Flomax  5. Prostate cancer on active surveillance followed by Dr. Paco Lin today  6. Lumbar disc herniation status post surgery  7   Vitamin D deficiency repeat status pending  8.   Annual alcohol screening is done he drinks about 4

## 2023-09-21 NOTE — PROGRESS NOTES
After obtaining written consent and per orders of Dr. Rachel Stiles, injection of prevnar 20  given by Clary Gonzales RN. Patient tolerated procedure well. VIS was given to them. No reactions noted. Verified by Tracy Mullins CMA.

## 2023-09-22 ENCOUNTER — TELEPHONE (OUTPATIENT)
Facility: CLINIC | Age: 68
End: 2023-09-22

## 2023-09-22 LAB
25(OH)D3 SERPL-MCNC: 24.1 NG/ML (ref 30–100)
ALBUMIN SERPL-MCNC: 4.2 G/DL (ref 3.5–5)
ALBUMIN/GLOB SERPL: 1.4 (ref 1.1–2.2)
ALP SERPL-CCNC: 64 U/L (ref 45–117)
ALT SERPL-CCNC: 34 U/L (ref 12–78)
ANION GAP SERPL CALC-SCNC: 4 MMOL/L (ref 5–15)
APPEARANCE UR: CLEAR
AST SERPL-CCNC: 20 U/L (ref 15–37)
BACTERIA URNS QL MICRO: NEGATIVE /HPF
BASOPHILS # BLD: 0.1 K/UL (ref 0–0.1)
BASOPHILS NFR BLD: 1 % (ref 0–1)
BILIRUB SERPL-MCNC: 1.1 MG/DL (ref 0.2–1)
BILIRUB UR QL: NEGATIVE
BUN SERPL-MCNC: 16 MG/DL (ref 6–20)
BUN/CREAT SERPL: 16 (ref 12–20)
CALCIUM SERPL-MCNC: 9.6 MG/DL (ref 8.5–10.1)
CHLORIDE SERPL-SCNC: 106 MMOL/L (ref 97–108)
CHOLEST SERPL-MCNC: 132 MG/DL
CK SERPL-CCNC: 139 U/L (ref 39–308)
CO2 SERPL-SCNC: 30 MMOL/L (ref 21–32)
COLOR UR: NORMAL
CREAT SERPL-MCNC: 1.02 MG/DL (ref 0.7–1.3)
DIFFERENTIAL METHOD BLD: ABNORMAL
EOSINOPHIL # BLD: 0 K/UL (ref 0–0.4)
EOSINOPHIL NFR BLD: 1 % (ref 0–7)
EPITH CASTS URNS QL MICRO: NORMAL /LPF
ERYTHROCYTE [DISTWIDTH] IN BLOOD BY AUTOMATED COUNT: 11.9 % (ref 11.5–14.5)
EST. AVERAGE GLUCOSE BLD GHB EST-MCNC: 114 MG/DL
GLOBULIN SER CALC-MCNC: 3.1 G/DL (ref 2–4)
GLUCOSE SERPL-MCNC: 109 MG/DL (ref 65–100)
GLUCOSE UR STRIP.AUTO-MCNC: NEGATIVE MG/DL
HBA1C MFR BLD: 5.6 % (ref 4–5.6)
HCT VFR BLD AUTO: 49.9 % (ref 36.6–50.3)
HDLC SERPL-MCNC: 74 MG/DL
HDLC SERPL: 1.8 (ref 0–5)
HGB BLD-MCNC: 16.7 G/DL (ref 12.1–17)
HGB UR QL STRIP: NEGATIVE
HYALINE CASTS URNS QL MICRO: NORMAL /LPF (ref 0–5)
IMM GRANULOCYTES # BLD AUTO: 0 K/UL (ref 0–0.04)
IMM GRANULOCYTES NFR BLD AUTO: 0 % (ref 0–0.5)
KETONES UR QL STRIP.AUTO: NEGATIVE MG/DL
LDLC SERPL CALC-MCNC: 38.8 MG/DL (ref 0–100)
LEUKOCYTE ESTERASE UR QL STRIP.AUTO: NEGATIVE
LYMPHOCYTES # BLD: 0.9 K/UL (ref 0.8–3.5)
LYMPHOCYTES NFR BLD: 14 % (ref 12–49)
MCH RBC QN AUTO: 31.9 PG (ref 26–34)
MCHC RBC AUTO-ENTMCNC: 33.5 G/DL (ref 30–36.5)
MCV RBC AUTO: 95.2 FL (ref 80–99)
MONOCYTES # BLD: 0.5 K/UL (ref 0–1)
MONOCYTES NFR BLD: 8 % (ref 5–13)
NEUTS SEG # BLD: 4.9 K/UL (ref 1.8–8)
NEUTS SEG NFR BLD: 76 % (ref 32–75)
NITRITE UR QL STRIP.AUTO: NEGATIVE
NRBC # BLD: 0 K/UL (ref 0–0.01)
NRBC BLD-RTO: 0 PER 100 WBC
PH UR STRIP: 7.5 (ref 5–8)
PLATELET # BLD AUTO: 305 K/UL (ref 150–400)
PMV BLD AUTO: 10.2 FL (ref 8.9–12.9)
POTASSIUM SERPL-SCNC: 5 MMOL/L (ref 3.5–5.1)
PROT SERPL-MCNC: 7.3 G/DL (ref 6.4–8.2)
PROT UR STRIP-MCNC: NEGATIVE MG/DL
PSA SERPL-MCNC: 6.7 NG/ML (ref 0.01–4)
RBC # BLD AUTO: 5.24 M/UL (ref 4.1–5.7)
RBC #/AREA URNS HPF: NORMAL /HPF (ref 0–5)
SODIUM SERPL-SCNC: 140 MMOL/L (ref 136–145)
SP GR UR REFRACTOMETRY: 1.02 (ref 1–1.03)
T4 FREE SERPL-MCNC: 0.9 NG/DL (ref 0.8–1.5)
TRIGL SERPL-MCNC: 96 MG/DL
TSH SERPL DL<=0.05 MIU/L-ACNC: 1.57 UIU/ML (ref 0.36–3.74)
UROBILINOGEN UR QL STRIP.AUTO: 0.2 EU/DL (ref 0.2–1)
VLDLC SERPL CALC-MCNC: 19.2 MG/DL
WBC # BLD AUTO: 6.4 K/UL (ref 4.1–11.1)
WBC URNS QL MICRO: NORMAL /HPF (ref 0–4)

## 2023-09-22 NOTE — TELEPHONE ENCOUNTER
Called patient and verified name and date of birth. Pt reports covid vaccine Wednesday and pneumonia vaccine yesterday morning. \"Feels like I got run over by a truck\"  Fevers, chills, muscle aches, feeling unwell. Covid test last night neg. Covid test this morning neg. CB number [id number]    Denies chest pain ,shortness of breath, or urgent symptoms. Call or return to clinic prn if these symptoms worsen or fail to improve as anticipated.         Signed By: Rosa Diaz Clinical Care Technician    09/22/23  10:59 AM

## 2023-09-27 NOTE — PROGRESS NOTES
Chief Complaint   Patient presents with    Cholesterol Problem     6 month follow up     Visit Vitals  /82 (BP 1 Location: Left upper arm, BP Patient Position: Sitting, BP Cuff Size: Adult)   Pulse 67   Temp 97.7 °F (36.5 °C) (Oral)   Resp 16   Ht 5' 8\" (1.727 m)   Wt 147 lb 6.4 oz (66.9 kg)   SpO2 97%   BMI 22.41 kg/m²     1. Have you been to the ER, urgent care clinic since your last visit? Hospitalized since your last visit? No    2. Have you seen or consulted any other health care providers outside of the [address] [place] [address] since your last visit? Include any pap smears or colon screening.  Oral[facility]-dermatology Normal kidney function and blood sugar    LDL cholesterol runs pretty high at 181. Other numbers are good.  Whole foods diet, weight loss and regular exercise advised.

## 2023-10-20 PROBLEM — Z00.00 WELCOME TO MEDICARE PREVENTIVE VISIT: Status: RESOLVED | Noted: 2023-09-20 | Resolved: 2023-10-20

## 2024-03-21 ENCOUNTER — OFFICE VISIT (OUTPATIENT)
Facility: CLINIC | Age: 69
End: 2024-03-21
Payer: MEDICARE

## 2024-03-21 VITALS
HEIGHT: 68 IN | SYSTOLIC BLOOD PRESSURE: 110 MMHG | DIASTOLIC BLOOD PRESSURE: 80 MMHG | HEART RATE: 59 BPM | BODY MASS INDEX: 22.55 KG/M2 | TEMPERATURE: 98 F | RESPIRATION RATE: 16 BRPM | WEIGHT: 148.8 LBS | OXYGEN SATURATION: 98 %

## 2024-03-21 DIAGNOSIS — E78.2 MIXED HYPERLIPIDEMIA: Primary | ICD-10-CM

## 2024-03-21 DIAGNOSIS — E74.39 GLUCOSE INTOLERANCE: ICD-10-CM

## 2024-03-21 DIAGNOSIS — C61 PROSTATE CA (HCC): ICD-10-CM

## 2024-03-21 DIAGNOSIS — I25.10 ASCVD (ARTERIOSCLEROTIC CARDIOVASCULAR DISEASE): ICD-10-CM

## 2024-03-21 LAB
ALBUMIN SERPL-MCNC: 3.9 G/DL (ref 3.5–5)
ALBUMIN/GLOB SERPL: 1.4 (ref 1.1–2.2)
ALP SERPL-CCNC: 61 U/L (ref 45–117)
ALT SERPL-CCNC: 32 U/L (ref 12–78)
ANION GAP SERPL CALC-SCNC: 4 MMOL/L (ref 5–15)
AST SERPL-CCNC: 29 U/L (ref 15–37)
BILIRUB SERPL-MCNC: 0.8 MG/DL (ref 0.2–1)
BUN SERPL-MCNC: 20 MG/DL (ref 6–20)
BUN/CREAT SERPL: 19 (ref 12–20)
CALCIUM SERPL-MCNC: 9.8 MG/DL (ref 8.5–10.1)
CHLORIDE SERPL-SCNC: 109 MMOL/L (ref 97–108)
CHOLEST SERPL-MCNC: 123 MG/DL
CK SERPL-CCNC: 347 U/L (ref 39–308)
CO2 SERPL-SCNC: 31 MMOL/L (ref 21–32)
CREAT SERPL-MCNC: 1.04 MG/DL (ref 0.7–1.3)
EST. AVERAGE GLUCOSE BLD GHB EST-MCNC: 114 MG/DL
GLOBULIN SER CALC-MCNC: 2.8 G/DL (ref 2–4)
GLUCOSE SERPL-MCNC: 120 MG/DL (ref 65–100)
HBA1C MFR BLD: 5.6 % (ref 4–5.6)
HDLC SERPL-MCNC: 66 MG/DL
HDLC SERPL: 1.9 (ref 0–5)
LDLC SERPL CALC-MCNC: 42.2 MG/DL (ref 0–100)
POTASSIUM SERPL-SCNC: 5.7 MMOL/L (ref 3.5–5.1)
PROT SERPL-MCNC: 6.7 G/DL (ref 6.4–8.2)
PSA SERPL-MCNC: 6.9 NG/ML (ref 0.01–4)
SODIUM SERPL-SCNC: 144 MMOL/L (ref 136–145)
TRIGL SERPL-MCNC: 74 MG/DL
VLDLC SERPL CALC-MCNC: 14.8 MG/DL

## 2024-03-21 PROCEDURE — G8420 CALC BMI NORM PARAMETERS: HCPCS | Performed by: INTERNAL MEDICINE

## 2024-03-21 PROCEDURE — 3017F COLORECTAL CA SCREEN DOC REV: CPT | Performed by: INTERNAL MEDICINE

## 2024-03-21 PROCEDURE — 1036F TOBACCO NON-USER: CPT | Performed by: INTERNAL MEDICINE

## 2024-03-21 PROCEDURE — 99214 OFFICE O/P EST MOD 30 MIN: CPT | Performed by: INTERNAL MEDICINE

## 2024-03-21 PROCEDURE — G8484 FLU IMMUNIZE NO ADMIN: HCPCS | Performed by: INTERNAL MEDICINE

## 2024-03-21 PROCEDURE — G8427 DOCREV CUR MEDS BY ELIG CLIN: HCPCS | Performed by: INTERNAL MEDICINE

## 2024-03-21 PROCEDURE — 1123F ACP DISCUSS/DSCN MKR DOCD: CPT | Performed by: INTERNAL MEDICINE

## 2024-03-21 RX ORDER — TADALAFIL 5 MG/1
5 TABLET ORAL DAILY
COMMUNITY

## 2024-03-21 RX ORDER — EVOLOCUMAB 140 MG/ML
INJECTION, SOLUTION SUBCUTANEOUS
Qty: 1 ML | Refills: 2 | Status: SHIPPED | OUTPATIENT
Start: 2024-03-21 | End: 2024-03-22 | Stop reason: SDUPTHER

## 2024-03-21 ASSESSMENT — PATIENT HEALTH QUESTIONNAIRE - PHQ9
2. FEELING DOWN, DEPRESSED OR HOPELESS: NOT AT ALL
SUM OF ALL RESPONSES TO PHQ QUESTIONS 1-9: 0
SUM OF ALL RESPONSES TO PHQ9 QUESTIONS 1 & 2: 0
SUM OF ALL RESPONSES TO PHQ QUESTIONS 1-9: 0

## 2024-03-21 NOTE — PROGRESS NOTES
Trenton Maurice is a 69 y.o. male     Chief Complaint   Patient presents with    6 Month Follow-Up       /80 (Site: Left Upper Arm, Position: Sitting, Cuff Size: Large Adult)   Pulse 59   Temp 98 °F (36.7 °C) (Oral)   Resp 16   Ht 1.727 m (5' 8\")   Wt 67.5 kg (148 lb 12.8 oz)   SpO2 98%   BMI 22.62 kg/m²     Health Maintenance Due   Topic Date Due    DTaP/Tdap/Td vaccine (1 - Tdap) Never done    Respiratory Syncytial Virus (RSV) Pregnant or age 60 yrs+ (1 - 1-dose 60+ series) Never done    COVID-19 Vaccine (2 - 2023-24 season) 11/15/2023         \"Have you been to the ER, urgent care clinic since your last visit?  Hospitalized since your last visit?\"    NO    “Have you seen or consulted any other health care providers outside of [facility] since your last visit?”    NO                  
pain, edema, palpitations or shortness of breath  Gastrointestinal: negative for - abdominal pain, blood in stools, heartburn or nausea/vomiting  Genito-Urinary: no dysuria, trouble voiding, or hematuria  Musculoskeletal: negative for - gait disturbance, joint pain, joint stiffness or joint swelling  Neurological: no TIA or stroke symptoms  Hematologic: no bruises, no bleeding  Lymphatic: no swollen glands  Integument: no lumps, mole changes, nail changes or rash  Endocrine:no malaise/lethargy poly uria or polydipsia or unexpected weight changes        Social History     Socioeconomic History    Marital status:      Spouse name: None    Number of children: None    Years of education: None    Highest education level: None   Tobacco Use    Smoking status: Never    Smokeless tobacco: Never   Vaping Use    Vaping Use: Never used   Substance and Sexual Activity    Alcohol use: Yes    Drug use: No     Social Determinants of Health     Physical Activity: Sufficiently Active (9/20/2023)    Exercise Vital Sign     Days of Exercise per Week: 6 days     Minutes of Exercise per Session: 100 min     History reviewed. No pertinent family history.    OBJECTIVE:     /80 (Site: Left Upper Arm, Position: Sitting, Cuff Size: Large Adult)   Pulse 59   Temp 98 °F (36.7 °C) (Oral)   Resp 16   Ht 1.727 m (5' 8\")   Wt 67.5 kg (148 lb 12.8 oz)   SpO2 98%   BMI 22.62 kg/m²   CONSTITUTIONAL:   well nourished, appears age appropriate  EYES: sclera anicteric, PERRL, EOMI  ENMT:nares clear, moist mucous membranes, pharynx clear  NECK: supple. Thyroid normal, No JVD or bruits  RESPIRATORY: Chest: clear to ascultation and percussion, normal inspiratory effort  CARDIOVASCULAR: Heart: regular rate and rhythm no murmurs, rubs or gallops, PMI not displaced, No thrills, no peripheral edema  GASTROINTESTINAL: Abdomen: non distended, soft, non tender, bowel sounds normal  HEMATOLOGIC: no purpura, petechiae or bruising  LYMPHATIC: No

## 2024-03-22 ENCOUNTER — TELEPHONE (OUTPATIENT)
Facility: CLINIC | Age: 69
End: 2024-03-22

## 2024-03-22 DIAGNOSIS — I25.10 ASCVD (ARTERIOSCLEROTIC CARDIOVASCULAR DISEASE): Primary | ICD-10-CM

## 2024-03-22 RX ORDER — EVOLOCUMAB 140 MG/ML
INJECTION, SOLUTION SUBCUTANEOUS
Qty: 1 ML | Refills: 2 | Status: SHIPPED | OUTPATIENT
Start: 2024-03-22

## 2024-03-22 NOTE — TELEPHONE ENCOUNTER
PCP: Leighton Dillon MD    Last appt: 3/21/2024  Future Appointments   Date Time Provider Department Center   9/12/2024  8:00 AM Leighton Dillon MD [facility] AM[facility]       Requested Prescriptions     Pending Prescriptions Disp Refills    Evolocumab (REPATHA SURECLICK) 140 MG/ML SOAJ 1 mL 2     Sig: PLEASE SEE ATTACHED FOR DETAILED DIRECTIONS       Prior labs and Blood pressures:  BP Readings from Last 3 Encounters:   03/21/24 110/80   09/21/23 122/82   09/12/22 128/82     Lab Results   Component Value Date/Time     03/21/2024 08:53 AM    K 5.7 03/21/2024 08:53 AM     03/21/2024 08:53 AM    CO2 31 03/21/2024 08:53 AM    BUN 20 03/21/2024 08:53 AM    GFRAA 87 09/01/2021 08:51 AM     No results found for: \"HBA1C\", \"[id number]\"  Lab Results   Component Value Date/Time    CHOL 123 03/21/2024 08:53 AM    HDL 66 03/21/2024 08:53 AM    VLDL 13 09/12/2022 09:32 AM     No results found for: \"VITD3\", \"VD3RIA\"        Lab Results   Component Value Date/Time    TSH 1.640 09/01/2021 08:51 AM

## 2024-03-26 DIAGNOSIS — I25.10 ASCVD (ARTERIOSCLEROTIC CARDIOVASCULAR DISEASE): ICD-10-CM

## 2024-03-26 RX ORDER — EVOLOCUMAB 140 MG/ML
INJECTION, SOLUTION SUBCUTANEOUS
Qty: 2 ML | Refills: 2 | Status: SHIPPED | OUTPATIENT
Start: 2024-03-26

## 2024-03-26 NOTE — TELEPHONE ENCOUNTER
PCP: Leighton Dillon MD    Last appt: 3/21/2024  Future Appointments   Date Time Provider Department Center   9/12/2024  8:00 AM Leighton Dillon MD [facility] AM[facility]       Requested Prescriptions     Pending Prescriptions Disp Refills    Evolocumab (REPATHA SURECLICK) 140 MG/ML SOAJ 2 mL 2     Sig: PLEASE SEE ATTACHED FOR DETAILED DIRECTIONS       Prior labs and Blood pressures:  BP Readings from Last 3 Encounters:   03/21/24 110/80   09/21/23 122/82   09/12/22 128/82     Lab Results   Component Value Date/Time     03/21/2024 08:53 AM    K 5.7 03/21/2024 08:53 AM     03/21/2024 08:53 AM    CO2 31 03/21/2024 08:53 AM    BUN 20 03/21/2024 08:53 AM    GFRAA 87 09/01/2021 08:51 AM     No results found for: \"HBA1C\", \"AHU2QPZO\"  Lab Results   Component Value Date/Time    CHOL 123 03/21/2024 08:53 AM    HDL 66 03/21/2024 08:53 AM    VLDL 13 09/12/2022 09:32 AM     No results found for: \"VITD3\", \"VD3RIA\"        Lab Results   Component Value Date/Time    TSH 1.640 09/01/2021 08:51 AM

## 2024-06-08 ENCOUNTER — HOSPITAL ENCOUNTER (EMERGENCY)
Facility: HOSPITAL | Age: 69
Discharge: HOME | End: 2024-06-08
Attending: STUDENT IN AN ORGANIZED HEALTH CARE EDUCATION/TRAINING PROGRAM
Payer: MEDICARE

## 2024-06-08 VITALS
RESPIRATION RATE: 19 BRPM | BODY MASS INDEX: 22.44 KG/M2 | WEIGHT: 143 LBS | OXYGEN SATURATION: 98 % | HEIGHT: 67 IN | SYSTOLIC BLOOD PRESSURE: 143 MMHG | HEART RATE: 60 BPM | TEMPERATURE: 97.6 F | DIASTOLIC BLOOD PRESSURE: 85 MMHG

## 2024-06-08 DIAGNOSIS — R55 SYNCOPE AND COLLAPSE: Primary | ICD-10-CM

## 2024-06-08 LAB
ALBUMIN SERPL-MCNC: 3.5 G/DL (ref 3.5–5)
ALBUMIN/GLOB SERPL: 1 (ref 1.1–2.2)
ALP SERPL-CCNC: 59 U/L (ref 45–117)
ALT SERPL-CCNC: 28 U/L (ref 12–78)
ANION GAP SERPL CALC-SCNC: 6 MMOL/L (ref 5–15)
AST SERPL-CCNC: 22 U/L (ref 15–37)
BASOPHILS # BLD: 0.1 K/UL (ref 0–0.1)
BASOPHILS NFR BLD: 1 % (ref 0–1)
BILIRUB SERPL-MCNC: 0.5 MG/DL (ref 0.2–1)
BUN SERPL-MCNC: 18 MG/DL (ref 6–20)
BUN/CREAT SERPL: 17 (ref 12–20)
CALCIUM SERPL-MCNC: 9.1 MG/DL (ref 8.5–10.1)
CHLORIDE SERPL-SCNC: 103 MMOL/L (ref 97–108)
CO2 SERPL-SCNC: 30 MMOL/L (ref 21–32)
COMMENT:: NORMAL
CREAT SERPL-MCNC: 1.07 MG/DL (ref 0.7–1.3)
DIFFERENTIAL METHOD BLD: NORMAL
EOSINOPHIL # BLD: 0.1 K/UL (ref 0–0.4)
EOSINOPHIL NFR BLD: 1 % (ref 0–7)
ERYTHROCYTE [DISTWIDTH] IN BLOOD BY AUTOMATED COUNT: 11.9 % (ref 11.5–14.5)
GLOBULIN SER CALC-MCNC: 3.6 G/DL (ref 2–4)
GLUCOSE SERPL-MCNC: 127 MG/DL (ref 65–100)
HCT VFR BLD AUTO: 42.9 % (ref 36.6–50.3)
HGB BLD-MCNC: 14.8 G/DL (ref 12.1–17)
IMM GRANULOCYTES # BLD AUTO: 0 K/UL (ref 0–0.04)
IMM GRANULOCYTES NFR BLD AUTO: 0 % (ref 0–0.5)
LYMPHOCYTES # BLD: 1.6 K/UL (ref 0.8–3.5)
LYMPHOCYTES NFR BLD: 19 % (ref 12–49)
MCH RBC QN AUTO: 31.6 PG (ref 26–34)
MCHC RBC AUTO-ENTMCNC: 34.5 G/DL (ref 30–36.5)
MCV RBC AUTO: 91.5 FL (ref 80–99)
MONOCYTES # BLD: 0.6 K/UL (ref 0–1)
MONOCYTES NFR BLD: 8 % (ref 5–13)
NEUTS SEG # BLD: 6 K/UL (ref 1.8–8)
NEUTS SEG NFR BLD: 71 % (ref 32–75)
NRBC # BLD: 0 K/UL (ref 0–0.01)
NRBC BLD-RTO: 0 PER 100 WBC
PLATELET # BLD AUTO: 304 K/UL (ref 150–400)
PMV BLD AUTO: 9.1 FL (ref 8.9–12.9)
POTASSIUM SERPL-SCNC: 4.4 MMOL/L (ref 3.5–5.1)
PROT SERPL-MCNC: 7.1 G/DL (ref 6.4–8.2)
RBC # BLD AUTO: 4.69 M/UL (ref 4.1–5.7)
SODIUM SERPL-SCNC: 139 MMOL/L (ref 136–145)
SPECIMEN HOLD: NORMAL
TROPONIN I SERPL HS-MCNC: 17 NG/L (ref 0–76)
WBC # BLD AUTO: 8.4 K/UL (ref 4.1–11.1)

## 2024-06-08 PROCEDURE — 85025 COMPLETE CBC W/AUTO DIFF WBC: CPT

## 2024-06-08 PROCEDURE — 99284 EMERGENCY DEPT VISIT MOD MDM: CPT

## 2024-06-08 PROCEDURE — 84484 ASSAY OF TROPONIN QUANT: CPT

## 2024-06-08 PROCEDURE — 80053 COMPREHEN METABOLIC PANEL: CPT

## 2024-06-08 PROCEDURE — 93005 ELECTROCARDIOGRAM TRACING: CPT | Performed by: STUDENT IN AN ORGANIZED HEALTH CARE EDUCATION/TRAINING PROGRAM

## 2024-06-08 PROCEDURE — 36415 COLL VENOUS BLD VENIPUNCTURE: CPT

## 2024-06-08 ASSESSMENT — PAIN DESCRIPTION - LOCATION: LOCATION: NECK

## 2024-06-08 ASSESSMENT — LIFESTYLE VARIABLES
HOW MANY STANDARD DRINKS CONTAINING ALCOHOL DO YOU HAVE ON A TYPICAL DAY: 1 OR 2
HOW OFTEN DO YOU HAVE A DRINK CONTAINING ALCOHOL: MONTHLY OR LESS

## 2024-06-08 ASSESSMENT — PAIN DESCRIPTION - ORIENTATION: ORIENTATION: RIGHT;LEFT

## 2024-06-08 ASSESSMENT — PAIN SCALES - GENERAL: PAINLEVEL_OUTOF10: 1

## 2024-06-08 ASSESSMENT — PAIN DESCRIPTION - DESCRIPTORS: DESCRIPTORS: SORE

## 2024-06-08 NOTE — DISCHARGE INSTRUCTIONS
Stay well-hydrated, eat frequently throughout the day, continue all medications as previously prescribed.  Return to the emergency department if you have any additional episodes, chest pain, shortness of breath or any other concerns.  Follow-up with your cardiologist for further care.

## 2024-06-08 NOTE — ED NOTES
Patient given discharge papers and instructions by this RN. Patient verbalized understanding and stated not having questions or concerns regarding his care. Patient ambulatory out of ED in no new acute distress.

## 2024-06-09 LAB
EKG ATRIAL RATE: 61 BPM
EKG DIAGNOSIS: NORMAL
EKG P AXIS: 65 DEGREES
EKG P-R INTERVAL: 142 MS
EKG Q-T INTERVAL: 432 MS
EKG QRS DURATION: 98 MS
EKG QTC CALCULATION (BAZETT): 434 MS
EKG R AXIS: -31 DEGREES
EKG T AXIS: 48 DEGREES
EKG VENTRICULAR RATE: 61 BPM

## 2024-06-09 NOTE — ED PROVIDER NOTES
[facility]  EMERGENCY DEPARTMENT ENCOUNTER      Pt Name: Trenton Maurice  MRN: [id number]  Birthdate 1955  Date of evaluation: 6/8/2024  Provider: Kalen Quiñones MD    CHIEF COMPLAINT       Chief Complaint   Patient presents with    Loss of Consciousness       HISTORY OF PRESENT ILLNESS    HPI    69-year-old male history of CAD with several stents, hyperlipidemia here for an episode of syncope.  Patient states that yesterday he went on a long bike ride, then went to an outdoor concert where he forgot to eat dinner, did have 1 alcoholic beverage.  He was sitting and then stood up, started to feel dizzy and then was observed by friends to syncopized.  He does not think he hit his head, but did not sustain any significant injuries.  He woke up immediately, drink some water felt better and went home.  He slept well, is feeling well today, but states he wants to get checked out.  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    He is denying any chest pain, shortness of breath, abdominal pain, vision changes, numbness, tingling, weakness.    He has had episodes of vasovagal syncope in the past.    Nursing notes reviewed.    REVIEW OF SYSTEMS     Review of Systems  Unless otherwise stated, a complete review of systems was asked of the patient. Pertinent positives are noted in the HPI section.    PAST MEDICAL HISTORY     Past Medical History:   Diagnosis Date    CAD (coronary artery disease)     Hyperlipidemia        SURGICAL HISTORY       Past Surgical History:   Procedure Laterality Date    ANGIOPLASTY      APPENDECTOMY      HERNIA REPAIR      TONSILLECTOMY      WISDOM TOOTH EXTRACTION         CURRENT MEDICATIONS       Discharge Medication List as of 6/8/2024 12:20 PM        CONTINUE these medications which have NOT CHANGED    Details   Evolocumab (REPATHA SURECLICK) 140 MG/ML SOAJ PLEASE SEE ATTACHED FOR DETAILED DIRECTIONS, Disp-2 mL,

## 2024-06-14 DIAGNOSIS — I25.10 ASCVD (ARTERIOSCLEROTIC CARDIOVASCULAR DISEASE): ICD-10-CM

## 2024-06-14 RX ORDER — EVOLOCUMAB 140 MG/ML
INJECTION, SOLUTION SUBCUTANEOUS
Qty: 2 ML | Refills: 2 | Status: SHIPPED | OUTPATIENT
Start: 2024-06-14

## 2024-06-14 NOTE — TELEPHONE ENCOUNTER
PCP: Leighton Dillon MD    Last appt: 3/21/2024  Future Appointments   Date Time Provider Department Center   9/12/2024  8:00 AM Leighton Dillon MD [facility] AMB       Requested Prescriptions     Pending Prescriptions Disp Refills    Evolocumab (REPATHA SURECLICK) 140 MG/ML SOAJ [Pharmacy Med Name: REPATHA 140 MG/ML SURECLICK] 2 mL 2     Sig: PLEASE SEE ATTACHED FOR DETAILED DIRECTIONS       Prior labs and Blood pressures:  BP Readings from Last 3 Encounters:   06/08/24 (!) 143/85   03/21/24 110/80   09/21/23 122/82     Lab Results   Component Value Date/Time     06/08/2024 11:27 AM    K 4.4 06/08/2024 11:27 AM     06/08/2024 11:27 AM    CO2 30 06/08/2024 11:27 AM    BUN 18 06/08/2024 11:27 AM    GFRAA 87 09/01/2021 08:51 AM     No results found for: \"HBA1C\", \"[id number]\"  Lab Results   Component Value Date/Time    CHOL 123 03/21/2024 08:53 AM    HDL 66 03/21/2024 08:53 AM    LDL 42.2 03/21/2024 08:53 AM    VLDL 14.8 03/21/2024 08:53 AM    VLDL 13 09/12/2022 09:32 AM     No results found for: \"VITD3\"        Lab Results   Component Value Date/Time    TSH 1.640 09/01/2021 08:51 AM

## 2024-06-26 ENCOUNTER — OFFICE VISIT (OUTPATIENT)
Facility: CLINIC | Age: 69
End: 2024-06-26
Payer: MEDICARE

## 2024-06-26 VITALS
SYSTOLIC BLOOD PRESSURE: 122 MMHG | WEIGHT: 146 LBS | DIASTOLIC BLOOD PRESSURE: 76 MMHG | HEIGHT: 67 IN | BODY MASS INDEX: 22.91 KG/M2 | OXYGEN SATURATION: 97 % | TEMPERATURE: 99 F | HEART RATE: 69 BPM | RESPIRATION RATE: 17 BRPM

## 2024-06-26 DIAGNOSIS — R05.9 COUGH, UNSPECIFIED TYPE: Primary | ICD-10-CM

## 2024-06-26 DIAGNOSIS — R55 SYNCOPE, UNSPECIFIED SYNCOPE TYPE: ICD-10-CM

## 2024-06-26 DIAGNOSIS — I25.10 ASCVD (ARTERIOSCLEROTIC CARDIOVASCULAR DISEASE): ICD-10-CM

## 2024-06-26 DIAGNOSIS — K40.90 RIGHT INGUINAL HERNIA: ICD-10-CM

## 2024-06-26 PROCEDURE — G8420 CALC BMI NORM PARAMETERS: HCPCS | Performed by: INTERNAL MEDICINE

## 2024-06-26 PROCEDURE — 3017F COLORECTAL CA SCREEN DOC REV: CPT | Performed by: INTERNAL MEDICINE

## 2024-06-26 PROCEDURE — 1036F TOBACCO NON-USER: CPT | Performed by: INTERNAL MEDICINE

## 2024-06-26 PROCEDURE — 1123F ACP DISCUSS/DSCN MKR DOCD: CPT | Performed by: INTERNAL MEDICINE

## 2024-06-26 PROCEDURE — G8427 DOCREV CUR MEDS BY ELIG CLIN: HCPCS | Performed by: INTERNAL MEDICINE

## 2024-06-26 PROCEDURE — 99214 OFFICE O/P EST MOD 30 MIN: CPT | Performed by: INTERNAL MEDICINE

## 2024-06-26 RX ORDER — CEFUROXIME AXETIL 500 MG/1
500 TABLET ORAL 2 TIMES DAILY
Qty: 20 TABLET | Refills: 0 | Status: SHIPPED | OUTPATIENT
Start: 2024-06-26 | End: 2024-07-06

## 2024-06-26 RX ORDER — EVOLOCUMAB 140 MG/ML
INJECTION, SOLUTION SUBCUTANEOUS
Qty: 2 ML | Refills: 2 | Status: SHIPPED | OUTPATIENT
Start: 2024-06-26

## 2024-06-26 NOTE — PROGRESS NOTES
Subjective:   Trenton Maurice is a 69 y.o. male      Chief Complaint   Patient presents with    respiratory illness and severe cough        History of present illness: He presents today with initial complaint of respiratory illness and cough that has been going on now for about 2 weeks initially started with upper respiratory symptoms but more chest congestion no significant he had a head congestion now.  He did have a little hoarseness early on.  He has noted no fevers or chills and really has been a nonproductive severe cough.  In addition of this he has several other issues to discuss 1 is he has noted a bulge in his right groin area consistent with a hernia that he needs evaluated and the other is an episode where he had after doing a 50 mile bike ride a couple of weeks ago.  He stayed hydrated during the bike ride but did not eat dinner and went to a music festival and when he went to stand up he got lightheaded and had to sit back down and then few minutes later he stood up again got lightheaded and unfortunately briefly passed out.  He sat there for a few minutes and was able to get up and go home and actually did not seek any attention to the next day.  The next day he went to a freestanding emergency room and subsequently was referred to his cardiologist.  With cardiology evaluation he did have a carotid Doppler which shows minimal narrowing on the right which she has had before and an echocardiogram that was okay.  This did lead to a stress test that shows mild inferior apical abnormality was unchanged from a stress test 4 years ago.  He is questioning whether he needs to have a catheterization as his wife would prefer he have 1 as he has had several stents placed including 1 in the right coronary which was a small stent.  He also has a MRI of his prostate coming up and may need to have a biopsy after that which she would like to get that done before considering neck catheterization because if he has a

## 2024-06-26 NOTE — PROGRESS NOTES
Trenton Maurice is a 69 y.o. male     Chief Complaint   Patient presents with    respiratory illness and severe cough       /76 (Site: Left Upper Arm, Position: Sitting, Cuff Size: Medium Adult)   Pulse 69   Temp 99 °F (37.2 °C) (Temporal)   Resp 17   Ht 1.702 m (5' 7\")   Wt 66.2 kg (146 lb)   SpO2 97%   BMI 22.87 kg/m²     Health Maintenance Due   Topic Date Due    DTaP/Tdap/Td vaccine (1 - Tdap) Never done    Respiratory Syncytial Virus (RSV) Pregnant or age 60 yrs+ (1 - 1-dose 60+ series) Never done    COVID-19 Vaccine (2 - 2023-24 season) 11/15/2023         \"Have you been to the ER, urgent care clinic since your last visit?  Hospitalized since your last visit?\"    [facility] [place] [facility]; 3 weeks ago    “Have you seen or consulted any other health care providers outside of [facility] since your last visit?”    NO

## 2024-07-23 ENCOUNTER — OFFICE VISIT (OUTPATIENT)
Age: 69
End: 2024-07-23
Payer: MEDICARE

## 2024-07-23 VITALS
TEMPERATURE: 97.8 F | BODY MASS INDEX: 23.45 KG/M2 | OXYGEN SATURATION: 96 % | SYSTOLIC BLOOD PRESSURE: 124 MMHG | DIASTOLIC BLOOD PRESSURE: 73 MMHG | WEIGHT: 149.4 LBS | HEIGHT: 67 IN | RESPIRATION RATE: 18 BRPM | HEART RATE: 67 BPM

## 2024-07-23 DIAGNOSIS — K40.90 RIGHT INGUINAL HERNIA: Primary | ICD-10-CM

## 2024-07-23 DIAGNOSIS — K43.9 VENTRAL HERNIA WITHOUT OBSTRUCTION OR GANGRENE: ICD-10-CM

## 2024-07-23 PROCEDURE — 1036F TOBACCO NON-USER: CPT | Performed by: SURGERY

## 2024-07-23 PROCEDURE — 1123F ACP DISCUSS/DSCN MKR DOCD: CPT | Performed by: SURGERY

## 2024-07-23 PROCEDURE — 3017F COLORECTAL CA SCREEN DOC REV: CPT | Performed by: SURGERY

## 2024-07-23 PROCEDURE — G8427 DOCREV CUR MEDS BY ELIG CLIN: HCPCS | Performed by: SURGERY

## 2024-07-23 PROCEDURE — 99204 OFFICE O/P NEW MOD 45 MIN: CPT | Performed by: SURGERY

## 2024-07-23 PROCEDURE — G8420 CALC BMI NORM PARAMETERS: HCPCS | Performed by: SURGERY

## 2024-07-23 RX ORDER — IBUPROFEN 800 MG/1
800 TABLET ORAL EVERY 8 HOURS PRN
COMMUNITY
Start: 2018-05-17

## 2024-07-23 ASSESSMENT — ENCOUNTER SYMPTOMS
WHEEZING: 0
NAUSEA: 0
BACK PAIN: 0
CONSTIPATION: 0
ABDOMINAL PAIN: 1
DIARRHEA: 0
SHORTNESS OF BREATH: 0
BLOOD IN STOOL: 0
COUGH: 0
STRIDOR: 0
VOMITING: 0
EYE PAIN: 0
SORE THROAT: 0

## 2024-07-23 NOTE — PROGRESS NOTES
Identified pt with two pt identifiers (name and ). Reviewed chart in preparation for visit and have obtained necessary documentation.    Trenton Maurice is a 69 y.o. male Possible hernia (Seen at the request of Dr KATHLEEN Dillon for evaluation of possible Rt inguinal hernia)  .    Vitals:    24 0804   BP: 124/73   Site: Left Upper Arm   Position: Sitting   Pulse: 67   Resp: 18   Temp: 97.8 °F (36.6 °C)   TempSrc: Oral   SpO2: 96%   Weight: 67.8 kg (149 lb 6.4 oz)   Height: 1.702 m (5' 7\")          1. Have you been to the ER, urgent care clinic since your last visit?  Hospitalized since your last visit?  no     2. Have you seen or consulted any other health care providers outside of the [facility] since your last visit?  Include any pap smears or colon screening.  no

## 2024-07-23 NOTE — PROGRESS NOTES
Subjective:      Patient ID: Trenton Maurice is a 69 y.o. male who comes I for consultation by Leighton [place] MD for a possible hernia      Chief Complaint   Patient presents with    Possible hernia     Seen at the request of Dr KATHLEEN [place] for evaluation of possible Rt inguinal hernia       HPI    He has noted right groin swelling for over 6 months. He had pneumonia and it began bothering him.   It was ok after that for awhile but over the last few months it is getting larger and annoying.   He has been wearing a TRUSS with some relief.  He denies associated nausea, vomiting, diarrhea, constipation, melena, hematochezia, dysuria or hematuria.  He previously had an LIH and also a laparoscopy for SBO and appendectomy.  Colonoscopy is up to date.   He remains very active physically.     Past Medical History:   Diagnosis Date    CAD (coronary artery disease)     Hyperlipidemia     Prostate cancer (HCC)      Past Surgical History:   Procedure Laterality Date    ANGIOPLASTY      several    APPENDECTOMY      in 8th grade    HERNIA REPAIR Left     TONSILLECTOMY      WISDOM TOOTH EXTRACTION       Family History   Problem Relation Age of Onset    Heart Disease Father     Colon Cancer Father     Prostate Cancer Father      Social History     Tobacco Use    Smoking status: Never     Passive exposure: Never    Smokeless tobacco: Never   Vaping Use    Vaping Use: Never used   Substance Use Topics    Alcohol use: Yes     Comment: occasionally    Drug use: No     Current Outpatient Medications   Medication Sig    Multiple Vitamin (MULTIVITAMIN ADULT PO) Take 1 tablet by mouth daily    ibuprofen (ADVIL;MOTRIN) 800 MG tablet Take 1 tablet by mouth every 8 hours as needed for Pain    Evolocumab (REPATHA SURECLICK) 140 MG/ML SOAJ PLEASE SEE ATTACHED FOR DETAILED DIRECTIONS (Patient taking differently: Inject 140 mg into the skin every 14 days PLEASE SEE ATTACHED FOR DETAILED DIRECTIONS)    tadalafil (CIALIS) 5 MG tablet Take 1

## 2024-09-10 ENCOUNTER — PREP FOR PROCEDURE (OUTPATIENT)
Age: 69
End: 2024-09-10

## 2024-09-10 ENCOUNTER — TELEPHONE (OUTPATIENT)
Age: 69
End: 2024-09-10

## 2024-09-10 PROBLEM — K43.2 INCISIONAL HERNIA: Status: ACTIVE | Noted: 2024-09-10

## 2024-09-10 NOTE — TELEPHONE ENCOUNTER
Called patient in regards to scheduling surgery, patient was driving and will call back later to schedule.

## 2024-09-11 PROBLEM — E78.2 MIXED HYPERLIPIDEMIA: Status: RESOLVED | Noted: 2019-07-16 | Resolved: 2024-09-11

## 2024-09-11 SDOH — HEALTH STABILITY: PHYSICAL HEALTH: ON AVERAGE, HOW MANY MINUTES DO YOU ENGAGE IN EXERCISE AT THIS LEVEL?: 60 MIN

## 2024-09-11 SDOH — HEALTH STABILITY: PHYSICAL HEALTH: ON AVERAGE, HOW MANY DAYS PER WEEK DO YOU ENGAGE IN MODERATE TO STRENUOUS EXERCISE (LIKE A BRISK WALK)?: 6 DAYS

## 2024-09-11 ASSESSMENT — PATIENT HEALTH QUESTIONNAIRE - PHQ9
SUM OF ALL RESPONSES TO PHQ QUESTIONS 1-9: 0
2. FEELING DOWN, DEPRESSED OR HOPELESS: NOT AT ALL
SUM OF ALL RESPONSES TO PHQ9 QUESTIONS 1 & 2: 0
SUM OF ALL RESPONSES TO PHQ QUESTIONS 1-9: 0
1. LITTLE INTEREST OR PLEASURE IN DOING THINGS: NOT AT ALL

## 2024-09-11 ASSESSMENT — LIFESTYLE VARIABLES
HOW OFTEN DO YOU HAVE A DRINK CONTAINING ALCOHOL: 2-3 TIMES A WEEK
HOW OFTEN DO YOU HAVE SIX OR MORE DRINKS ON ONE OCCASION: 1
HOW OFTEN DO YOU HAVE A DRINK CONTAINING ALCOHOL: 4
HOW MANY STANDARD DRINKS CONTAINING ALCOHOL DO YOU HAVE ON A TYPICAL DAY: 1 OR 2
HOW MANY STANDARD DRINKS CONTAINING ALCOHOL DO YOU HAVE ON A TYPICAL DAY: 1

## 2024-09-12 ENCOUNTER — OFFICE VISIT (OUTPATIENT)
Facility: CLINIC | Age: 69
End: 2024-09-12

## 2024-09-12 VITALS
DIASTOLIC BLOOD PRESSURE: 75 MMHG | OXYGEN SATURATION: 95 % | RESPIRATION RATE: 17 BRPM | SYSTOLIC BLOOD PRESSURE: 120 MMHG | HEART RATE: 58 BPM | WEIGHT: 147.4 LBS | BODY MASS INDEX: 23.13 KG/M2 | HEIGHT: 67 IN | TEMPERATURE: 97.7 F

## 2024-09-12 DIAGNOSIS — I25.10 ASCVD (ARTERIOSCLEROTIC CARDIOVASCULAR DISEASE): ICD-10-CM

## 2024-09-12 DIAGNOSIS — Z00.00 MEDICARE ANNUAL WELLNESS VISIT, INITIAL: ICD-10-CM

## 2024-09-12 DIAGNOSIS — E74.39 GLUCOSE INTOLERANCE: ICD-10-CM

## 2024-09-12 DIAGNOSIS — Z00.00 INITIAL MEDICARE ANNUAL WELLNESS VISIT: ICD-10-CM

## 2024-09-12 DIAGNOSIS — E78.2 MIXED HYPERLIPIDEMIA: Primary | ICD-10-CM

## 2024-09-12 DIAGNOSIS — M51.26 LUMBAR DISC HERNIATION: ICD-10-CM

## 2024-09-12 DIAGNOSIS — Z13.39 SCREENING FOR ALCOHOLISM: ICD-10-CM

## 2024-09-12 DIAGNOSIS — H61.92 SKIN LESION OF LEFT EAR: ICD-10-CM

## 2024-09-12 DIAGNOSIS — C61 PROSTATE CA (HCC): ICD-10-CM

## 2024-09-12 DIAGNOSIS — E55.9 VITAMIN D DEFICIENCY: ICD-10-CM

## 2024-09-12 DIAGNOSIS — Z13.39 ALCOHOL SCREENING: ICD-10-CM

## 2024-09-12 LAB
25(OH)D3 SERPL-MCNC: 33.4 NG/ML (ref 30–100)
ALBUMIN SERPL-MCNC: 4.2 G/DL (ref 3.5–5)
ALBUMIN/GLOB SERPL: 1.4 (ref 1.1–2.2)
ALP SERPL-CCNC: 60 U/L (ref 45–117)
ALT SERPL-CCNC: 29 U/L (ref 12–78)
ANION GAP SERPL CALC-SCNC: 4 MMOL/L (ref 2–12)
APPEARANCE UR: CLEAR
AST SERPL-CCNC: 24 U/L (ref 15–37)
BACTERIA URNS QL MICRO: NEGATIVE /HPF
BASOPHILS # BLD: 0.1 K/UL (ref 0–0.1)
BASOPHILS NFR BLD: 1 % (ref 0–1)
BILIRUB SERPL-MCNC: 0.8 MG/DL (ref 0.2–1)
BILIRUB UR QL: NEGATIVE
BUN SERPL-MCNC: 20 MG/DL (ref 6–20)
BUN/CREAT SERPL: 21 (ref 12–20)
CALCIUM SERPL-MCNC: 9.6 MG/DL (ref 8.5–10.1)
CHLORIDE SERPL-SCNC: 106 MMOL/L (ref 97–108)
CHOLEST SERPL-MCNC: 127 MG/DL
CK SERPL-CCNC: 170 U/L (ref 39–308)
CO2 SERPL-SCNC: 28 MMOL/L (ref 21–32)
COLOR UR: NORMAL
CREAT SERPL-MCNC: 0.94 MG/DL (ref 0.7–1.3)
DIFFERENTIAL METHOD BLD: NORMAL
EOSINOPHIL # BLD: 0.1 K/UL (ref 0–0.4)
EOSINOPHIL NFR BLD: 1 % (ref 0–7)
EPITH CASTS URNS QL MICRO: NORMAL /LPF
ERYTHROCYTE [DISTWIDTH] IN BLOOD BY AUTOMATED COUNT: 12.2 % (ref 11.5–14.5)
EST. AVERAGE GLUCOSE BLD GHB EST-MCNC: 117 MG/DL
GLOBULIN SER CALC-MCNC: 3.1 G/DL (ref 2–4)
GLUCOSE SERPL-MCNC: 108 MG/DL (ref 65–100)
GLUCOSE UR STRIP.AUTO-MCNC: NEGATIVE MG/DL
HBA1C MFR BLD: 5.7 % (ref 4–5.6)
HCT VFR BLD AUTO: 45.3 % (ref 36.6–50.3)
HDLC SERPL-MCNC: 69 MG/DL
HDLC SERPL: 1.8 (ref 0–5)
HGB BLD-MCNC: 15 G/DL (ref 12.1–17)
HGB UR QL STRIP: NEGATIVE
HYALINE CASTS URNS QL MICRO: NORMAL /LPF (ref 0–5)
IMM GRANULOCYTES # BLD AUTO: 0 K/UL (ref 0–0.04)
IMM GRANULOCYTES NFR BLD AUTO: 0 % (ref 0–0.5)
KETONES UR QL STRIP.AUTO: NEGATIVE MG/DL
LDLC SERPL CALC-MCNC: 42.6 MG/DL (ref 0–100)
LEUKOCYTE ESTERASE UR QL STRIP.AUTO: NEGATIVE
LYMPHOCYTES # BLD: 1.7 K/UL (ref 0.8–3.5)
LYMPHOCYTES NFR BLD: 28 % (ref 12–49)
MCH RBC QN AUTO: 31.3 PG (ref 26–34)
MCHC RBC AUTO-ENTMCNC: 33.1 G/DL (ref 30–36.5)
MCV RBC AUTO: 94.4 FL (ref 80–99)
MONOCYTES # BLD: 0.6 K/UL (ref 0–1)
MONOCYTES NFR BLD: 9 % (ref 5–13)
NEUTS SEG # BLD: 3.6 K/UL (ref 1.8–8)
NEUTS SEG NFR BLD: 61 % (ref 32–75)
NITRITE UR QL STRIP.AUTO: NEGATIVE
NRBC # BLD: 0 K/UL (ref 0–0.01)
NRBC BLD-RTO: 0 PER 100 WBC
PH UR STRIP: 6.5 (ref 5–8)
PLATELET # BLD AUTO: 323 K/UL (ref 150–400)
PMV BLD AUTO: 10 FL (ref 8.9–12.9)
POTASSIUM SERPL-SCNC: 5 MMOL/L (ref 3.5–5.1)
PROT SERPL-MCNC: 7.3 G/DL (ref 6.4–8.2)
PROT UR STRIP-MCNC: NEGATIVE MG/DL
RBC # BLD AUTO: 4.8 M/UL (ref 4.1–5.7)
RBC #/AREA URNS HPF: NORMAL /HPF (ref 0–5)
SODIUM SERPL-SCNC: 138 MMOL/L (ref 136–145)
SP GR UR REFRACTOMETRY: 1.02 (ref 1–1.03)
T4 FREE SERPL-MCNC: 1 NG/DL (ref 0.8–1.5)
TRIGL SERPL-MCNC: 77 MG/DL
TSH SERPL DL<=0.05 MIU/L-ACNC: 1.2 UIU/ML (ref 0.36–3.74)
UROBILINOGEN UR QL STRIP.AUTO: 1 EU/DL (ref 0.2–1)
VLDLC SERPL CALC-MCNC: 15.4 MG/DL
WBC # BLD AUTO: 6 K/UL (ref 4.1–11.1)
WBC URNS QL MICRO: NORMAL /HPF (ref 0–4)

## 2024-09-17 DIAGNOSIS — I25.10 ASCVD (ARTERIOSCLEROTIC CARDIOVASCULAR DISEASE): ICD-10-CM

## 2024-09-17 RX ORDER — EVOLOCUMAB 140 MG/ML
140 INJECTION, SOLUTION SUBCUTANEOUS
Qty: 6 ADJUSTABLE DOSE PRE-FILLED PEN SYRINGE | Refills: 0 | Status: SHIPPED | OUTPATIENT
Start: 2024-09-17

## 2024-10-11 PROBLEM — Z00.00 MEDICARE ANNUAL WELLNESS VISIT, INITIAL: Status: RESOLVED | Noted: 2023-09-20 | Resolved: 2024-10-11

## 2024-10-24 DIAGNOSIS — I25.10 ASCVD (ARTERIOSCLEROTIC CARDIOVASCULAR DISEASE): ICD-10-CM

## 2024-10-24 RX ORDER — EVOLOCUMAB 140 MG/ML
140 INJECTION, SOLUTION SUBCUTANEOUS
Qty: 6 ADJUSTABLE DOSE PRE-FILLED PEN SYRINGE | Refills: 4 | Status: SHIPPED | OUTPATIENT
Start: 2024-10-24

## 2024-10-24 NOTE — TELEPHONE ENCOUNTER
PCP: Leighton [place] MD    Last appt: 9/12/2024  Future Appointments   Date Time Provider Department Center   11/22/2024  8:00 AM Nicko Guevara MD [facility] BS AMB   3/12/2025  8:10 AM Leighton [place] MD [facility] ECC DEP       Requested Prescriptions     Pending Prescriptions Disp Refills    Evolocumab (REPATHA SURECLICK) 140 MG/ML SOAJ 6 Adjustable Dose Pre-filled Pen Syringe 4     Sig: Inject 140 mg into the skin every 14 days PLEASE SEE ATTACHED FOR DETAILED DIRECTIONS       Prior labs and Blood pressures:  BP Readings from Last 3 Encounters:   09/12/24 120/75   07/23/24 124/73   06/26/24 122/76     Lab Results   Component Value Date/Time     09/12/2024 08:56 AM    K 5.0 09/12/2024 08:56 AM     09/12/2024 08:56 AM    CO2 28 09/12/2024 08:56 AM    BUN 20 09/12/2024 08:56 AM    GFRAA 87 09/01/2021 08:51 AM     No results found for: \"HBA1C\", \"[id number]\"  Lab Results   Component Value Date/Time    CHOL 127 09/12/2024 08:56 AM    HDL 69 09/12/2024 08:56 AM    LDL 42.6 09/12/2024 08:56 AM    LDL 42.2 03/21/2024 08:53 AM    VLDL 15.4 09/12/2024 08:56 AM    VLDL 13 09/12/2022 09:32 AM     No results found for: \"VITD3\"        Lab Results   Component Value Date/Time    TSH 1.20 09/12/2024 08:56 AM

## 2024-10-24 NOTE — TELEPHONE ENCOUNTER
Pharmacy: [facility]  Patient requesting a 3 m supply      Evolocumab (REPATHA SURECLICK) 140 MG/ML SOSALOMONJ [[id number]]    Order Details  Dose: 140 mg Route: SubCUTAneous Frequency: EVERY 14 DAYS   Dispense Quantity: 6 Adjustable Dose Pre-filled Pen Syringe Refills: 0          Sig: Inject 140 mg into the skin every 14 days PLEASE SEE ATTACHED FOR DETAILED DIRECTIONS

## 2024-10-25 NOTE — PROGRESS NOTES
[facility]  Preoperative Instructions        Surgery Date 11/7/2024   Time of Arrival to be called between 2pm - 5pm the day before surgery  Contact# [phone]    On the day of your surgery, please report to Surgical Services Registration Desk and sign in at your designated time.  The Surgery Center is located to the right of the Emergency Room.     2. You must have someone with you to drive you home. You should not drive a car for 24 hours following surgery. Please make arrangements for a friend or family member to stay with you for the first 24 hours after your surgery.    3. Do not have anything to eat or drink (including water, gum, mints, coffee, juice) after midnight ??   11/6/2024 .?This may not apply to medications prescribed by your physician. ?(Please note below the special instructions with medications to take the morning of your procedure.)    4. We recommend you do not drink any alcoholic beverages for 24 hours before and after your surgery.    5. Contact your surgeon's office for instructions on the following medications: non-steroidal anti-inflammatory drugs (i.e. Advil, Aleve), vitamins, and supplements. (Some surgeon's will want you to stop these medications prior to surgery and others may allow you to take them)  **If you are currently taking Plavix, Coumadin, Aspirin and/or other blood-thinning agents, contact your surgeon for instructions.** Your surgeon will partner with the physician prescribing these medications to determine if it is safe to stop or if you need to continue taking.  Please do not stop taking these medications without instructions from your surgeon    6. Wear comfortable clothes.  Wear glasses instead of contacts.  Do not bring any money or jewelry. Please bring picture ID, insurance card, and any prearranged co-payment or hospital payment.  Do not wear make-up, particularly mascara the morning of your surgery.  Do not wear nail polish, particularly if

## 2024-10-25 NOTE — PROGRESS NOTES
surgery:  Shower again thoroughly with an antibacterial soap, such as Dial or the soap provided at your preassessment appointment. If needed, ask someone for help to reach all areas of your body. Don’t forget to clean your belly button! Rinse.  With bottle of Hibiclens/Chlorhexidine in hand, turn water off.  Apply Hibiclens antiseptic skin cleanser with a clean, freshly washed washcloth.  Gently apply to your body from chin to toes (except the genital area) and especially the area(s) where your incision(s) will be.  Leave Hibiclens/Chlorhexidine on your skin for at least 20 seconds.     CAUTION: If needed, Hibiclens/chlorhexidine may be used to clean the folds of skin of the legs (such as in the area of the groin) and on your buttocks and hips. However, do not use Hibiclens/Chlorhexidine above the neck or in the genital area (your bottom) or put inside any area of your body.  Turn the water back on and rinse.  Dry gently with a clean, freshly washed towel.  After your shower, do not use any powder, deodorant, perfumes or lotion prior to surgery.  Put on clean, freshly washed clothing.    Tips to help prevent infections after your surgery:  Protect your surgical wound from germs:  Hand washing is the most important thing you and your caregivers can do to prevent infections.  Keep your bandage clean and dry!  Do not touch your surgical wound.  Use clean, freshly washed towels and washcloths every time you shower; do not share bath linens with others.  Until your surgical wound is healed, wear clothing and sleep on bed linens each day that are clean and freshly washed.  Do not allow pets to sleep in your bed with you or touch your surgical wound.  Do not smoke - smoking delays wound healing. This may be a good time to stop smoking.  If you have diabetes, it is important for you to manage your blood sugar levels properly before your surgery as well as after your surgery. Poorly managed blood sugar levels slow down wound  healing and prevent you from healing completely.    If you lose your Hibiclens/chlorhexidine, please call the Surgery Center, or it is available for purchase at your pharmacy.               ___________________      ___________________      ________________  (Signature of Patient)          (Witness)                   (Date and Time)

## 2024-11-07 ENCOUNTER — ANESTHESIA EVENT (OUTPATIENT)
Facility: HOSPITAL | Age: 69
End: 2024-11-07
Payer: MEDICARE

## 2024-11-07 ENCOUNTER — ANESTHESIA (OUTPATIENT)
Facility: HOSPITAL | Age: 69
End: 2024-11-07
Payer: MEDICARE

## 2024-11-07 ENCOUNTER — HOSPITAL ENCOUNTER (OUTPATIENT)
Facility: HOSPITAL | Age: 69
Setting detail: OUTPATIENT SURGERY
Discharge: HOME | End: 2024-11-07
Attending: SURGERY | Admitting: SURGERY
Payer: MEDICARE

## 2024-11-07 VITALS
RESPIRATION RATE: 16 BRPM | BODY MASS INDEX: 22.7 KG/M2 | SYSTOLIC BLOOD PRESSURE: 122 MMHG | HEIGHT: 67 IN | OXYGEN SATURATION: 97 % | WEIGHT: 144.62 LBS | HEART RATE: 72 BPM | TEMPERATURE: 97.5 F | DIASTOLIC BLOOD PRESSURE: 74 MMHG

## 2024-11-07 DIAGNOSIS — K43.2 INCISIONAL HERNIA, WITHOUT OBSTRUCTION OR GANGRENE: Primary | ICD-10-CM

## 2024-11-07 DIAGNOSIS — K40.90 RIGHT INGUINAL HERNIA: ICD-10-CM

## 2024-11-07 PROCEDURE — 2500000003 HC RX 250 WO HCPCS: Performed by: NURSE ANESTHETIST, CERTIFIED REGISTERED

## 2024-11-07 PROCEDURE — 7100000001 HC PACU RECOVERY - ADDTL 15 MIN: Performed by: SURGERY

## 2024-11-07 PROCEDURE — 2500000003 HC RX 250 WO HCPCS: Performed by: SURGERY

## 2024-11-07 PROCEDURE — 3600000009 HC SURGERY ROBOT BASE: Performed by: SURGERY

## 2024-11-07 PROCEDURE — S2900 ROBOTIC SURGICAL SYSTEM: HCPCS | Performed by: SURGERY

## 2024-11-07 PROCEDURE — 7100000000 HC PACU RECOVERY - FIRST 15 MIN: Performed by: SURGERY

## 2024-11-07 PROCEDURE — C1781 MESH (IMPLANTABLE): HCPCS | Performed by: SURGERY

## 2024-11-07 PROCEDURE — 88302 TISSUE EXAM BY PATHOLOGIST: CPT

## 2024-11-07 PROCEDURE — 7100000010 HC PHASE II RECOVERY - FIRST 15 MIN: Performed by: SURGERY

## 2024-11-07 PROCEDURE — 2709999900 HC NON-CHARGEABLE SUPPLY: Performed by: SURGERY

## 2024-11-07 PROCEDURE — 6360000002 HC RX W HCPCS: Performed by: SURGERY

## 2024-11-07 PROCEDURE — 6360000002 HC RX W HCPCS: Performed by: NURSE ANESTHETIST, CERTIFIED REGISTERED

## 2024-11-07 PROCEDURE — 2580000003 HC RX 258: Performed by: SURGERY

## 2024-11-07 PROCEDURE — 2580000003 HC RX 258: Performed by: NURSE ANESTHETIST, CERTIFIED REGISTERED

## 2024-11-07 PROCEDURE — 3700000001 HC ADD 15 MINUTES (ANESTHESIA): Performed by: SURGERY

## 2024-11-07 PROCEDURE — 49650 LAP ING HERNIA REPAIR INIT: CPT | Performed by: SURGERY

## 2024-11-07 PROCEDURE — 3700000000 HC ANESTHESIA ATTENDED CARE: Performed by: SURGERY

## 2024-11-07 PROCEDURE — 49593 RPR AA HRN 1ST 3-10 RDC: CPT | Performed by: SURGERY

## 2024-11-07 PROCEDURE — 7100000011 HC PHASE II RECOVERY - ADDTL 15 MIN: Performed by: SURGERY

## 2024-11-07 PROCEDURE — 3600000019 HC SURGERY ROBOT ADDTL 15MIN: Performed by: SURGERY

## 2024-11-07 DEVICE — 3DMAX MESH, 10.8 CM X 16.0 CM (4.3" X 6.3"), RIGHT, LARGE
Type: IMPLANTABLE DEVICE | Site: ABDOMEN | Status: FUNCTIONAL
Brand: 3DMAX

## 2024-11-07 DEVICE — VENTRALIGHT ST MESH, 4" X 6" (10.2 CM X 15.2 CM), ELLIPSE
Type: IMPLANTABLE DEVICE | Site: ABDOMEN | Status: FUNCTIONAL
Brand: VENTRALIGHT

## 2024-11-07 RX ORDER — DEXMEDETOMIDINE HYDROCHLORIDE 100 UG/ML
INJECTION, SOLUTION INTRAVENOUS
Status: DISCONTINUED | OUTPATIENT
Start: 2024-11-07 | End: 2024-11-07 | Stop reason: SDUPTHER

## 2024-11-07 RX ORDER — ROCURONIUM BROMIDE 10 MG/ML
INJECTION, SOLUTION INTRAVENOUS
Status: DISCONTINUED | OUTPATIENT
Start: 2024-11-07 | End: 2024-11-07 | Stop reason: SDUPTHER

## 2024-11-07 RX ORDER — FENTANYL CITRATE 50 UG/ML
50 INJECTION, SOLUTION INTRAMUSCULAR; INTRAVENOUS EVERY 5 MIN PRN
Status: DISCONTINUED | OUTPATIENT
Start: 2024-11-07 | End: 2024-11-07 | Stop reason: HOSPADM

## 2024-11-07 RX ORDER — SODIUM CHLORIDE 0.9 % (FLUSH) 0.9 %
5-40 SYRINGE (ML) INJECTION PRN
Status: DISCONTINUED | OUTPATIENT
Start: 2024-11-07 | End: 2024-11-07 | Stop reason: HOSPADM

## 2024-11-07 RX ORDER — OXYCODONE HYDROCHLORIDE 5 MG/1
5 TABLET ORAL EVERY 6 HOURS PRN
Qty: 12 TABLET | Refills: 0 | Status: SHIPPED | OUTPATIENT
Start: 2024-11-07 | End: 2024-11-10

## 2024-11-07 RX ORDER — ONDANSETRON 2 MG/ML
INJECTION INTRAMUSCULAR; INTRAVENOUS
Status: DISCONTINUED | OUTPATIENT
Start: 2024-11-07 | End: 2024-11-07 | Stop reason: SDUPTHER

## 2024-11-07 RX ORDER — SODIUM CHLORIDE, SODIUM LACTATE, POTASSIUM CHLORIDE, CALCIUM CHLORIDE 600; 310; 30; 20 MG/100ML; MG/100ML; MG/100ML; MG/100ML
INJECTION, SOLUTION INTRAVENOUS ONCE
Status: DISCONTINUED | OUTPATIENT
Start: 2024-11-07 | End: 2024-11-07 | Stop reason: HOSPADM

## 2024-11-07 RX ORDER — SODIUM CHLORIDE 0.9 % (FLUSH) 0.9 %
5-40 SYRINGE (ML) INJECTION EVERY 12 HOURS SCHEDULED
Status: DISCONTINUED | OUTPATIENT
Start: 2024-11-07 | End: 2024-11-07 | Stop reason: HOSPADM

## 2024-11-07 RX ORDER — NALOXONE HYDROCHLORIDE 0.4 MG/ML
INJECTION, SOLUTION INTRAMUSCULAR; INTRAVENOUS; SUBCUTANEOUS PRN
Status: DISCONTINUED | OUTPATIENT
Start: 2024-11-07 | End: 2024-11-07 | Stop reason: HOSPADM

## 2024-11-07 RX ORDER — HYDROMORPHONE HYDROCHLORIDE 1 MG/ML
0.5 INJECTION, SOLUTION INTRAMUSCULAR; INTRAVENOUS; SUBCUTANEOUS EVERY 5 MIN PRN
Status: DISCONTINUED | OUTPATIENT
Start: 2024-11-07 | End: 2024-11-07 | Stop reason: HOSPADM

## 2024-11-07 RX ORDER — SODIUM CHLORIDE, SODIUM LACTATE, POTASSIUM CHLORIDE, CALCIUM CHLORIDE 600; 310; 30; 20 MG/100ML; MG/100ML; MG/100ML; MG/100ML
INJECTION, SOLUTION INTRAVENOUS
Status: DISCONTINUED | OUTPATIENT
Start: 2024-11-07 | End: 2024-11-07 | Stop reason: SDUPTHER

## 2024-11-07 RX ORDER — ONDANSETRON 2 MG/ML
4 INJECTION INTRAMUSCULAR; INTRAVENOUS
Status: DISCONTINUED | OUTPATIENT
Start: 2024-11-07 | End: 2024-11-07 | Stop reason: HOSPADM

## 2024-11-07 RX ORDER — GLYCOPYRROLATE 0.2 MG/ML
INJECTION INTRAMUSCULAR; INTRAVENOUS
Status: DISCONTINUED | OUTPATIENT
Start: 2024-11-07 | End: 2024-11-07 | Stop reason: SDUPTHER

## 2024-11-07 RX ORDER — SODIUM CHLORIDE 9 MG/ML
INJECTION, SOLUTION INTRAVENOUS PRN
Status: DISCONTINUED | OUTPATIENT
Start: 2024-11-07 | End: 2024-11-07 | Stop reason: HOSPADM

## 2024-11-07 RX ORDER — POLYETHYLENE GLYCOL 3350 17 G/17G
17 POWDER, FOR SOLUTION ORAL 2 TIMES DAILY
Qty: 510 G | Refills: 0 | Status: SHIPPED | OUTPATIENT
Start: 2024-11-07 | End: 2024-11-22

## 2024-11-07 RX ORDER — MEPERIDINE HYDROCHLORIDE 25 MG/ML
12.5 INJECTION INTRAMUSCULAR; INTRAVENOUS; SUBCUTANEOUS EVERY 5 MIN PRN
Status: DISCONTINUED | OUTPATIENT
Start: 2024-11-07 | End: 2024-11-07 | Stop reason: HOSPADM

## 2024-11-07 RX ORDER — SUCCINYLCHOLINE CHLORIDE 20 MG/ML
INJECTION INTRAMUSCULAR; INTRAVENOUS
Status: DISCONTINUED | OUTPATIENT
Start: 2024-11-07 | End: 2024-11-07 | Stop reason: SDUPTHER

## 2024-11-07 RX ORDER — PROCHLORPERAZINE EDISYLATE 5 MG/ML
5 INJECTION INTRAMUSCULAR; INTRAVENOUS
Status: DISCONTINUED | OUTPATIENT
Start: 2024-11-07 | End: 2024-11-07 | Stop reason: HOSPADM

## 2024-11-07 RX ORDER — KETOROLAC TROMETHAMINE 30 MG/ML
15 INJECTION, SOLUTION INTRAMUSCULAR; INTRAVENOUS
Status: COMPLETED | OUTPATIENT
Start: 2024-11-07 | End: 2024-11-07

## 2024-11-07 RX ORDER — DEXAMETHASONE SODIUM PHOSPHATE 4 MG/ML
INJECTION, SOLUTION INTRA-ARTICULAR; INTRALESIONAL; INTRAMUSCULAR; INTRAVENOUS; SOFT TISSUE
Status: DISCONTINUED | OUTPATIENT
Start: 2024-11-07 | End: 2024-11-07 | Stop reason: SDUPTHER

## 2024-11-07 RX ORDER — OXYCODONE HYDROCHLORIDE 5 MG/1
5 TABLET ORAL
Status: DISCONTINUED | OUTPATIENT
Start: 2024-11-07 | End: 2024-11-07 | Stop reason: HOSPADM

## 2024-11-07 RX ORDER — MIDAZOLAM HYDROCHLORIDE 1 MG/ML
INJECTION, SOLUTION INTRAMUSCULAR; INTRAVENOUS
Status: DISCONTINUED | OUTPATIENT
Start: 2024-11-07 | End: 2024-11-07 | Stop reason: SDUPTHER

## 2024-11-07 RX ORDER — EPHEDRINE SULFATE/0.9% NACL/PF 50 MG/5 ML
SYRINGE (ML) INTRAVENOUS
Status: DISCONTINUED | OUTPATIENT
Start: 2024-11-07 | End: 2024-11-07 | Stop reason: SDUPTHER

## 2024-11-07 RX ADMIN — HYDROMORPHONE HYDROCHLORIDE 1 MG: 1 INJECTION, SOLUTION INTRAMUSCULAR; INTRAVENOUS; SUBCUTANEOUS at 11:47

## 2024-11-07 RX ADMIN — ROCURONIUM BROMIDE 40 MG: 10 INJECTION INTRAVENOUS at 10:01

## 2024-11-07 RX ADMIN — SUGAMMADEX 200 MG: 100 INJECTION, SOLUTION INTRAVENOUS at 11:31

## 2024-11-07 RX ADMIN — SODIUM CHLORIDE, SODIUM LACTATE, POTASSIUM CHLORIDE, AND CALCIUM CHLORIDE: 600; 310; 30; 20 INJECTION, SOLUTION INTRAVENOUS at 09:52

## 2024-11-07 RX ADMIN — SUCCINYLCHOLINE CHLORIDE 120 MG: 20 INJECTION, SOLUTION INTRAMUSCULAR; INTRAVENOUS at 09:56

## 2024-11-07 RX ADMIN — LIDOCAINE HYDROCHLORIDE 100 MG: 20 INJECTION, SOLUTION EPIDURAL; INFILTRATION; INTRACAUDAL; PERINEURAL at 09:56

## 2024-11-07 RX ADMIN — WATER 2000 MG: 1 INJECTION INTRAMUSCULAR; INTRAVENOUS; SUBCUTANEOUS at 10:09

## 2024-11-07 RX ADMIN — MIDAZOLAM HYDROCHLORIDE 2 MG: 1 INJECTION, SOLUTION INTRAMUSCULAR; INTRAVENOUS at 09:52

## 2024-11-07 RX ADMIN — HYDROMORPHONE HYDROCHLORIDE 1 MG: 1 INJECTION, SOLUTION INTRAMUSCULAR; INTRAVENOUS; SUBCUTANEOUS at 09:52

## 2024-11-07 RX ADMIN — ONDANSETRON HYDROCHLORIDE 4 MG: 2 INJECTION, SOLUTION INTRAMUSCULAR; INTRAVENOUS at 10:01

## 2024-11-07 RX ADMIN — PHENYLEPHRINE HYDROCHLORIDE 20 MCG/MIN: 10 INJECTION INTRAVENOUS at 10:01

## 2024-11-07 RX ADMIN — Medication 5 MG: at 10:06

## 2024-11-07 RX ADMIN — ROCURONIUM BROMIDE 20 MG: 10 INJECTION INTRAVENOUS at 11:05

## 2024-11-07 RX ADMIN — ROCURONIUM BROMIDE 10 MG: 10 INJECTION INTRAVENOUS at 09:56

## 2024-11-07 RX ADMIN — DEXAMETHASONE SODIUM PHOSPHATE 8 MG: 4 INJECTION, SOLUTION INTRAMUSCULAR; INTRAVENOUS at 10:01

## 2024-11-07 RX ADMIN — GLYCOPYRROLATE 0.2 MG: 0.2 INJECTION INTRAMUSCULAR; INTRAVENOUS at 10:27

## 2024-11-07 RX ADMIN — Medication 5 MG: at 10:26

## 2024-11-07 RX ADMIN — DEXMEDETOMIDINE 20 MCG: 100 INJECTION, SOLUTION INTRAVENOUS at 11:50

## 2024-11-07 RX ADMIN — Medication 5 MG: at 10:29

## 2024-11-07 RX ADMIN — PROPOFOL 100 MG: 10 INJECTION, EMULSION INTRAVENOUS at 09:56

## 2024-11-07 RX ADMIN — DEXMEDETOMIDINE 20 MCG: 100 INJECTION, SOLUTION INTRAVENOUS at 09:52

## 2024-11-07 RX ADMIN — KETOROLAC TROMETHAMINE 15 MG: 30 INJECTION, SOLUTION INTRAMUSCULAR at 12:41

## 2024-11-07 ASSESSMENT — PAIN DESCRIPTION - LOCATION
LOCATION: ABDOMEN
LOCATION: ABDOMEN

## 2024-11-07 ASSESSMENT — PAIN SCALES - GENERAL
PAINLEVEL_OUTOF10: 3
PAINLEVEL_OUTOF10: 3

## 2024-11-07 ASSESSMENT — PAIN - FUNCTIONAL ASSESSMENT: PAIN_FUNCTIONAL_ASSESSMENT: NONE - DENIES PAIN

## 2024-11-07 ASSESSMENT — PAIN DESCRIPTION - DESCRIPTORS: DESCRIPTORS: SORE;ACHING

## 2024-11-07 ASSESSMENT — PAIN DESCRIPTION - ORIENTATION: ORIENTATION: MID

## 2024-11-07 ASSESSMENT — PAIN DESCRIPTION - PAIN TYPE: TYPE: SURGICAL PAIN

## 2024-11-07 NOTE — ANESTHESIA POSTPROCEDURE EVALUATION
Department of Anesthesiology  Postprocedure Note    Patient: Trenton Maurice  MRN: [id number]  YOB: 1955  Date of evaluation: 11/7/2024    Procedure Summary       Date: 11/07/24 Room / Location: [facility] MAIN OR M9 / [facility] MAIN OR    Anesthesia Start: 0952 Anesthesia Stop: 1154    Procedure: ROBOTIC ASSISTED LAPAROSCOPIC RIGHT INGUINAL HERNIA REPAIR WITH MESH AND INCISIONAL HERNIA REPAIR WITH MESH (Abdomen) Diagnosis:       Right inguinal hernia      Incisional hernia      (Right inguinal hernia [K40.90])      (Incisional hernia [K43.2])    Providers: Nicko Guevara MD Responsible Provider: Andres Montemayor MD    Anesthesia Type: General ASA Status: 3            Anesthesia Type: General    Kalin Phase I: Kalin Score: 9    Kalin Phase II:      Anesthesia Post Evaluation    Patient location during evaluation: PACU  Patient participation: complete - patient participated  Level of consciousness: sleepy but conscious and responsive to verbal stimuli  Airway patency: patent  Nausea & Vomiting: no vomiting and no nausea  Cardiovascular status: blood pressure returned to baseline and hemodynamically stable  Respiratory status: acceptable  Hydration status: stable    No notable events documented.

## 2024-11-07 NOTE — H&P
Subjective:      Patient ID: Trenton Maurice is a 69 y.o. male who comes I for consultation by Leighton [place] MD for a possible hernia             Chief Complaint   Patient presents with    Possible hernia       Seen at the request of Dr KATHLEEN Dillon for evaluation of possible Rt inguinal hernia         HPI     He has noted right groin swelling for over 6 months. He had pneumonia and it began bothering him.   It was ok after that for awhile but over the last few months it is getting larger and annoying.   He has been wearing a TRUSS with some relief.  He denies associated nausea, vomiting, diarrhea, constipation, melena, hematochezia, dysuria or hematuria.  He previously had an LIH and also a laparoscopy for SBO and appendectomy.  Colonoscopy is up to date.   He remains very active physically.      Past Medical History        Past Medical History:   Diagnosis Date    CAD (coronary artery disease)      Hyperlipidemia      Prostate cancer (HCC)           Past Surgical History         Past Surgical History:   Procedure Laterality Date    ANGIOPLASTY         several    APPENDECTOMY         in 8th grade    HERNIA REPAIR Left      TONSILLECTOMY        WISDOM TOOTH EXTRACTION             Family History         Family History   Problem Relation Age of Onset    Heart Disease Father      Colon Cancer Father      Prostate Cancer Father           Social History   Social History            Tobacco Use    Smoking status: Never       Passive exposure: Never    Smokeless tobacco: Never   Vaping Use    Vaping Use: Never used   Substance Use Topics    Alcohol use: Yes       Comment: occasionally    Drug use: No              Current Outpatient Medications   Medication Sig    Multiple Vitamin (MULTIVITAMIN ADULT PO) Take 1 tablet by mouth daily    ibuprofen (ADVIL;MOTRIN) 800 MG tablet Take 1 tablet by mouth every 8 hours as needed for Pain    Evolocumab (REPATHA SURECLICK) 140 MG/ML SOAJ PLEASE SEE ATTACHED FOR DETAILED  DIRECTIONS (Patient taking differently: Inject 140 mg into the skin every 14 days PLEASE SEE ATTACHED FOR DETAILED DIRECTIONS)    tadalafil (CIALIS) 5 MG tablet Take 1 tablet by mouth daily    Rosuvastatin Calcium 20 MG CPSP 20 mg    aspirin 81 MG chewable tablet Take 1 tablet by mouth daily    vitamin D 25 MCG (1000 UT) CAPS Take 1 capsule by mouth daily      No current facility-administered medications for this visit.      Allergies   No Known Allergies           Review of Systems   Constitutional:  Negative for chills, diaphoresis, fatigue, fever and unexpected weight change.   HENT:  Negative for congestion, ear pain and sore throat.    Eyes:  Negative for pain.   Respiratory:  Negative for cough, shortness of breath, wheezing and stridor.    Cardiovascular:  Negative for chest pain, palpitations and leg swelling.   Gastrointestinal:  Positive for abdominal pain. Negative for blood in stool, constipation, diarrhea, nausea and vomiting.   Endocrine: Negative for polydipsia.   Genitourinary:  Negative for dysuria, flank pain, frequency, hematuria and urgency.   Musculoskeletal:  Negative for arthralgias, back pain, gait problem and myalgias.   Neurological:  Negative for dizziness, seizures, weakness, numbness and headaches.   Psychiatric/Behavioral:  Negative for behavioral problems. The patient is not nervous/anxious.             /73 (Site: Left Upper Arm, Position: Sitting)   Pulse 67   Temp 97.8 °F (36.6 °C) (Oral)   Resp 18   Ht 1.702 m (5' 7\")   Wt 67.8 kg (149 lb 6.4 oz)   SpO2 96%   BMI 23.40 kg/m²      Objective:   Physical Exam  Vitals and nursing note reviewed.   Constitutional:       General: He is not in acute distress.     Appearance: Normal appearance. He is well-developed. He is not ill-appearing or diaphoretic.   HENT:      Head: Normocephalic and atraumatic.   Eyes:      General: No scleral icterus.     Extraocular Movements: Extraocular movements intact.      Conjunctiva/sclera:

## 2024-11-07 NOTE — ANESTHESIA PRE PROCEDURE
Department of Anesthesiology  Preprocedure Note       Name:  Trenton Maurice   Age:  69 y.o.  :  1955                                          MRN:  [id number]         Date:  2024      Surgeon: Surgeon(s):  Nicko Guevara MD    Procedure: Procedure(s):  ROBOTIC ASSISTED LAPAROSCOPIC RIGHT INGUINAL HERNIA REPAIR WITH MESH AND INCISIONAL HERNIA REPAIR WITH MESH    Medications prior to admission:   Prior to Admission medications    Medication Sig Start Date End Date Taking? Authorizing Provider   Evolocumab (REPATHA SURECLICK) 140 MG/ML SOAJ Inject 140 mg into the skin every 14 days PLEASE SEE ATTACHED FOR DETAILED DIRECTIONS 10/24/24   Leighton [place] MD   Multiple Vitamin (MULTIVITAMIN ADULT PO) Take 1 tablet by mouth daily    ProviderJennifer MD   ibuprofen (ADVIL;MOTRIN) 800 MG tablet Take 1 tablet by mouth every 8 hours as needed for Pain 18   Jennifer Nicholson MD   tadalafil (CIALIS) 5 MG tablet Take 1 tablet by mouth daily    ProviderJennifer MD   Rosuvastatin Calcium 20 MG CPSP 20 mg    ProviderJennifer MD   aspirin 81 MG chewable tablet Take 1 tablet by mouth daily 18   Automatic Reconciliation, Ar   vitamin D 25 MCG (1000 UT) CAPS Take 1 capsule by mouth daily    Automatic Reconciliation, Ar       Current medications:    Current Facility-Administered Medications   Medication Dose Route Frequency Provider Last Rate Last Admin   • lactated ringers infusion   IntraVENous Once Vamsi Boston MD       • ceFAZolin (ANCEF) 2,000 mg in sterile water 20 mL IV syringe  2,000 mg IntraVENous Once Nicko Guevara MD           Allergies:  No Known Allergies    Problem List:    Patient Active Problem List   Diagnosis Code   • Fatigue R53.83   • Glucose intolerance E74.39   • COVID-19 U07.1   • Vitamin D deficiency E55.9   • ASCVD (arteriosclerotic cardiovascular disease) I25.10   • Intractable low back pain M54.59   • Prostate CA (HCC) C61   • Benign prostatic

## 2024-11-07 NOTE — DISCHARGE INSTRUCTIONS
Discharge Instructions:  Hernia Repair    Dr. Guevara    Call for appointment for follow up in 2 weeks [phone]    Activity:    Walk regularly.  No lifting more than 10 pounds for 6 weeks.  Light aerobic activity is okay when you feel up to it.    You may resume driving in five days unless still requiring narcotics for pain.      Work:    You may return to work in 2 or 3 weeks to light activity. No lifting more than 10 pounds for 6 weeks.    Diet:    You may resume normal diet after 24 hours.  Anesthesia and narcotics may cause nausea and vomiting.  If persistent please call the office.    Wound Care:    You have a special dressing called Dermabond.  It is okay to shower and let the water run over the incisions but do not scrub the area or soak in a tub.  If you have a small amount of drainage you may place a dry bandage over the wound and change it daily.  If you experience a lot of drainage, develop redness around the wound, or a fever over 101 F occurs please call the office.    Use ice over right groin and periumbilical area for 20 minutes every 1-2 hours to reduce swelling and pain.  Wear abdominal binder at all times for 3 weeks and then when out of bed for an additional 3 weeks.   It is helpful to wear binder over a T shirt.    Medications:    Resume home medications as indicated on the Medical Reconciliation form.     Aspirin and Coumadin can be restarted immediately if you were taking them preoperatively.  If taking Plavix do not restart it until post operative day 2.      Pain medications:  Non steroidal antiinflammatories seem to work best for post surgical pain.  Try these first as prescribed.  A narcotic prescription will also be given for breakthrough pain.    Over the counter stool softeners and laxatives may be used if needed.    Do not hesitate to call with questions or concerns.    DISCHARGE SUMMARY from Nurse    PATIENT INSTRUCTIONS:    After general anesthesia or intravenous sedation, for 24  hours or while taking prescription narcotics:    Have someone responsible help you with your care  Limit your activities  Do not drive and operate hazardous machinery  Do not make important personal, legal or business decisions  Do not drink alcoholic beverages  If you have not urinated within 8 hours after discharge, please contact your surgeon on call  Resume your medications unless otherwise instructed    From general anesthesia, intravenous sedation, or while taking prescription narcotics, you may experience:    Drowsiness, dizziness, sleepiness, or confusion  Difficulty remembering or delayed reaction times  Difficulty with your balance, especially while walking, move slowly and carefully, do not make sudden position changes  Difficulty focusing or blurred vision    You may not be aware of slight changes in your behavior and/or your reaction time because of the medication used during and after your procedure.    Report the following to your surgeon:  Excessive pain, swelling, redness or odor of or around the surgical area  Temperature over 100.5  Nausea and vomiting lasting longer than 4 hours or if unable to take medications  Any signs of decreased circulation or nerve impairment to extremity: change in color, persistent numbness, tingling, coldness or increase pain  Any questions or concerns         IF YOU REPORT TO AN EMERGENCY ROOM, DOCTOR'S OFFICE OR HOSPITAL WITHIN 24 HOURS AFTER YOUR PROCEDURE, BRING THIS SHEET AND YOUR AFTER VISIT SUMMARY WITH YOU AND GIVE IT TO THE PHYSICIAN OR NURSE ATTENDING YOU.    These are general instructions for a healthy lifestyle (if applicable):    No smoking/ No tobacco products/ Avoid exposure to secondhand smoke  Surgeon General's Warning:  Quitting smoking now greatly reduces serious risk to your health.    Obesity, smoking, and sedentary lifestyle greatly increases your risk for illness    A healthy diet, regular physical exercise & weight monitoring are important for

## 2024-11-07 NOTE — PERIOP NOTE
0804 - PT DENIES FEVER, COLD, COUGH, SOB, N/V, DIARRHEA...  PRE-OP TCHING DONE -PT VERBALIZES UNDERSTANDING.  STRETCHER IN LOWEST POSITION, CB IN PLACE AND SR UP X2.

## 2024-11-07 NOTE — OP NOTE
[facility]              [address] [place]  [address]                            OPERATIVE REPORT      PATIENT NAME: KHADRA JOHNSON           : 1955  MED REC NO: [id number]                       ROOM: OR  ACCOUNT NO: [id number]                       ADMIT DATE: 2024  PROVIDER: Nicko Sherwood MD    DATE OF SERVICE:  2024    PREOPERATIVE DIAGNOSES:  Right inguinal hernia and incisional hernia.    POSTOPERATIVE DIAGNOSES:  Right inguinal hernia and incisional hernia.    PROCEDURES PERFORMED:  Robotic-assisted laparoscopic right inguinal hernia repair with mesh and incisional hernia repair with mesh.    SURGEON:  Nicko Sherwood MD    ASSISTANT:  Gerald Liddle.    ANESTHESIA:  General.    ESTIMATED BLOOD LOSS:  Minimal.    SPECIMENS REMOVED:       1. Right inguinal hernia sac.     2. Incisional hernia sac.    INTRAOPERATIVE FINDINGS:  No infection.    Other findings:  Direct right inguinal hernia as well as a 3.5 cm incisional hernia.     COMPLICATIONS:  None.    IMPLANTS:  In the right groin is a piece of Bard Davol 3DMax polypropylene mesh 10.8 x 16 cm 3DMax large, lot #[id number].  For the incisional hernia, it was a piece of Bard mesh, 4 x 6 Ventralight with separate technology, lot #[id number].    INDICATIONS:  The patient is a pleasant 69-year-old gentleman with symptomatic hernias.  Options were discussed.  He elected to undergo repair.  He understands the risks and benefits and these are noted in my office notes.    DESCRIPTION OF PROCEDURE:  The patient was taken to the operating room and placed on the operating table in the supine position.  He underwent general anesthesia and the abdomen was prepped and draped in the usual sterile fashion.  After appropriate time-out, antibiotics were given.  0.5% Marcaine was infiltrated in the skin and subcutaneous tissues 1 cm superior and 1 cm medially to the anterior superior iliac crest

## 2024-11-22 ENCOUNTER — OFFICE VISIT (OUTPATIENT)
Age: 69
End: 2024-11-22

## 2024-11-22 VITALS
WEIGHT: 141.8 LBS | RESPIRATION RATE: 16 BRPM | TEMPERATURE: 97.7 F | SYSTOLIC BLOOD PRESSURE: 105 MMHG | OXYGEN SATURATION: 95 % | BODY MASS INDEX: 22.26 KG/M2 | HEART RATE: 72 BPM | DIASTOLIC BLOOD PRESSURE: 66 MMHG | HEIGHT: 67 IN

## 2024-11-22 DIAGNOSIS — Z48.89 ENCOUNTER FOR POSTOPERATIVE CARE: Primary | ICD-10-CM

## 2024-11-22 PROCEDURE — 99024 POSTOP FOLLOW-UP VISIT: CPT | Performed by: SURGERY

## 2024-11-22 RX ORDER — ROSUVASTATIN CALCIUM 20 MG/1
20 TABLET, COATED ORAL DAILY
COMMUNITY
Start: 2024-09-17

## 2024-11-22 ASSESSMENT — PATIENT HEALTH QUESTIONNAIRE - PHQ9
SUM OF ALL RESPONSES TO PHQ9 QUESTIONS 1 & 2: 0
1. LITTLE INTEREST OR PLEASURE IN DOING THINGS: NOT AT ALL
SUM OF ALL RESPONSES TO PHQ QUESTIONS 1-9: 0
SUM OF ALL RESPONSES TO PHQ QUESTIONS 1-9: 0
2. FEELING DOWN, DEPRESSED OR HOPELESS: NOT AT ALL
SUM OF ALL RESPONSES TO PHQ QUESTIONS 1-9: 0
SUM OF ALL RESPONSES TO PHQ QUESTIONS 1-9: 0

## 2024-11-22 NOTE — PROGRESS NOTES
Identified pt with two pt identifiers (name and ). Reviewed chart in preparation for visit and have obtained necessary documentation.    Trenton Maurice is a 69 y.o. male Follow-up (S/p ROBOTIC ASSISTED LAPAROSCOPIC RIGHT INGUINAL HERNIA REPAIR WITH MESH AND INCISIONAL HERNIA REPAIR WITH MESH on 24.)  .    Vitals:    24 0754   BP: 105/66   Site: Left Upper Arm   Position: Sitting   Pulse: 72   Resp: 16   Temp: 97.7 °F (36.5 °C)   TempSrc: Oral   SpO2: 95%   Weight: 64.3 kg (141 lb 12.8 oz)   Height: 1.702 m (5' 7\")          1. Have you been to the ER, urgent care clinic since your last visit?  Hospitalized since your last visit?  no     2. Have you seen or consulted any other health care providers outside of the [facility] since your last visit?  Include any pap smears or colon screening.  no

## 2024-11-22 NOTE — PROGRESS NOTES
Surgery  Follow up    Procedure: Robotic-assisted laparoscopic right inguinal hernia repair with mesh and incisional hernia repair with mesh   OR date:  11/7/2024  Path:    FINAL PATHOLOGIC DIAGNOSIS     1.  Right inguinal hernia sac, herniorrhaphy:        Benign mesothelial lined fibroadipose tissue consistent with hernia   sac     2. Incisional hernia sac, repair:        Nine mesothelial lined fibroadipose tissue consistent with hernia sac     S I feel ok, still sore.     /66 (Site: Left Upper Arm, Position: Sitting)   Pulse 72   Temp 97.7 °F (36.5 °C) (Oral)   Resp 16   Ht 1.702 m (5' 7\")   Wt 64.3 kg (141 lb 12.8 oz)   SpO2 95%   BMI 22.21 kg/m²     O Incisions healing well without infection   No signs of hernia/seroma    A/P Doing well   No lifting for another 4 weeks   RTW na   RTC 6 weeks or prn    Nicko Guevara MD FACS

## 2024-11-25 ENCOUNTER — HOSPITAL ENCOUNTER (INPATIENT)
Facility: HOSPITAL | Age: 69
LOS: 1 days | Discharge: HOME | DRG: 392 | End: 2024-11-26
Attending: EMERGENCY MEDICINE | Admitting: STUDENT IN AN ORGANIZED HEALTH CARE EDUCATION/TRAINING PROGRAM
Payer: MEDICARE

## 2024-11-25 ENCOUNTER — APPOINTMENT (OUTPATIENT)
Facility: HOSPITAL | Age: 69
DRG: 392 | End: 2024-11-25
Payer: MEDICARE

## 2024-11-25 DIAGNOSIS — K52.9 ENTERITIS: Primary | ICD-10-CM

## 2024-11-25 DIAGNOSIS — K56.609 SBO (SMALL BOWEL OBSTRUCTION) (HCC): ICD-10-CM

## 2024-11-25 LAB
ALBUMIN SERPL-MCNC: 3.9 G/DL (ref 3.5–5)
ALBUMIN/GLOB SERPL: 1.1 (ref 1.1–2.2)
ALP SERPL-CCNC: 57 U/L (ref 45–117)
ALT SERPL-CCNC: 23 U/L (ref 12–78)
ANION GAP SERPL CALC-SCNC: 5 MMOL/L (ref 2–12)
APPEARANCE UR: CLEAR
AST SERPL-CCNC: 11 U/L (ref 15–37)
BACTERIA URNS QL MICRO: NEGATIVE /HPF
BASOPHILS # BLD: 0.1 K/UL (ref 0–0.1)
BASOPHILS NFR BLD: 1 % (ref 0–1)
BILIRUB SERPL-MCNC: 0.9 MG/DL (ref 0.2–1)
BILIRUB UR QL: NEGATIVE
BUN SERPL-MCNC: 17 MG/DL (ref 6–20)
BUN/CREAT SERPL: 16 (ref 12–20)
CALCIUM SERPL-MCNC: 10.4 MG/DL (ref 8.5–10.1)
CHLORIDE SERPL-SCNC: 103 MMOL/L (ref 97–108)
CO2 SERPL-SCNC: 28 MMOL/L (ref 21–32)
COLOR UR: NORMAL
COMMENT:: NORMAL
CREAT SERPL-MCNC: 1.05 MG/DL (ref 0.7–1.3)
DIFFERENTIAL METHOD BLD: ABNORMAL
EKG ATRIAL RATE: 58 BPM
EKG DIAGNOSIS: NORMAL
EKG P AXIS: 63 DEGREES
EKG P-R INTERVAL: 134 MS
EKG Q-T INTERVAL: 444 MS
EKG QRS DURATION: 98 MS
EKG QTC CALCULATION (BAZETT): 435 MS
EKG R AXIS: -33 DEGREES
EKG T AXIS: 36 DEGREES
EKG VENTRICULAR RATE: 58 BPM
EOSINOPHIL # BLD: 0.2 K/UL (ref 0–0.4)
EOSINOPHIL NFR BLD: 2 % (ref 0–7)
EPITH CASTS URNS QL MICRO: NORMAL /LPF
ERYTHROCYTE [DISTWIDTH] IN BLOOD BY AUTOMATED COUNT: 11.9 % (ref 11.5–14.5)
GLOBULIN SER CALC-MCNC: 3.5 G/DL (ref 2–4)
GLUCOSE SERPL-MCNC: 146 MG/DL (ref 65–100)
GLUCOSE UR STRIP.AUTO-MCNC: NEGATIVE MG/DL
HCT VFR BLD AUTO: 46.5 % (ref 36.6–50.3)
HGB BLD-MCNC: 16.2 G/DL (ref 12.1–17)
HGB UR QL STRIP: NEGATIVE
IMM GRANULOCYTES # BLD AUTO: 0.1 K/UL (ref 0–0.04)
IMM GRANULOCYTES NFR BLD AUTO: 0 % (ref 0–0.5)
KETONES UR QL STRIP.AUTO: NEGATIVE MG/DL
LEUKOCYTE ESTERASE UR QL STRIP.AUTO: NEGATIVE
LIPASE SERPL-CCNC: 23 U/L (ref 13–75)
LYMPHOCYTES # BLD: 1.7 K/UL (ref 0.8–3.5)
LYMPHOCYTES NFR BLD: 14 % (ref 12–49)
MCH RBC QN AUTO: 32.2 PG (ref 26–34)
MCHC RBC AUTO-ENTMCNC: 34.8 G/DL (ref 30–36.5)
MCV RBC AUTO: 92.4 FL (ref 80–99)
MONOCYTES # BLD: 0.9 K/UL (ref 0–1)
MONOCYTES NFR BLD: 7 % (ref 5–13)
NEUTS SEG # BLD: 9.3 K/UL (ref 1.8–8)
NEUTS SEG NFR BLD: 76 % (ref 32–75)
NITRITE UR QL STRIP.AUTO: NEGATIVE
NRBC # BLD: 0 K/UL (ref 0–0.01)
NRBC BLD-RTO: 0 PER 100 WBC
PH UR STRIP: 7 (ref 5–8)
PLATELET # BLD AUTO: 377 K/UL (ref 150–400)
PMV BLD AUTO: 9.2 FL (ref 8.9–12.9)
POTASSIUM SERPL-SCNC: 4 MMOL/L (ref 3.5–5.1)
PROT SERPL-MCNC: 7.4 G/DL (ref 6.4–8.2)
PROT UR STRIP-MCNC: NEGATIVE MG/DL
RBC # BLD AUTO: 5.03 M/UL (ref 4.1–5.7)
RBC #/AREA URNS HPF: NORMAL /HPF (ref 0–5)
SODIUM SERPL-SCNC: 136 MMOL/L (ref 136–145)
SP GR UR REFRACTOMETRY: 1.01 (ref 1–1.03)
SPECIMEN HOLD: NORMAL
SPECIMEN HOLD: NORMAL
TROPONIN I SERPL HS-MCNC: 13 NG/L (ref 0–76)
UROBILINOGEN UR QL STRIP.AUTO: 0.2 EU/DL (ref 0.2–1)
WBC # BLD AUTO: 12.2 K/UL (ref 4.1–11.1)
WBC URNS QL MICRO: NORMAL /HPF (ref 0–4)

## 2024-11-25 PROCEDURE — 80053 COMPREHEN METABOLIC PANEL: CPT

## 2024-11-25 PROCEDURE — 6360000002 HC RX W HCPCS: Performed by: PHYSICIAN ASSISTANT

## 2024-11-25 PROCEDURE — 2060000000 HC ICU INTERMEDIATE R&B

## 2024-11-25 PROCEDURE — 6360000002 HC RX W HCPCS: Performed by: NURSE PRACTITIONER

## 2024-11-25 PROCEDURE — 93005 ELECTROCARDIOGRAM TRACING: CPT | Performed by: EMERGENCY MEDICINE

## 2024-11-25 PROCEDURE — 2580000003 HC RX 258: Performed by: EMERGENCY MEDICINE

## 2024-11-25 PROCEDURE — 99221 1ST HOSP IP/OBS SF/LOW 40: CPT | Performed by: SURGERY

## 2024-11-25 PROCEDURE — 83690 ASSAY OF LIPASE: CPT

## 2024-11-25 PROCEDURE — 2580000003 HC RX 258: Performed by: PHYSICIAN ASSISTANT

## 2024-11-25 PROCEDURE — 36415 COLL VENOUS BLD VENIPUNCTURE: CPT

## 2024-11-25 PROCEDURE — 85025 COMPLETE CBC W/AUTO DIFF WBC: CPT

## 2024-11-25 PROCEDURE — 81001 URINALYSIS AUTO W/SCOPE: CPT

## 2024-11-25 PROCEDURE — 6360000002 HC RX W HCPCS: Performed by: EMERGENCY MEDICINE

## 2024-11-25 PROCEDURE — 96374 THER/PROPH/DIAG INJ IV PUSH: CPT

## 2024-11-25 PROCEDURE — 6360000004 HC RX CONTRAST MEDICATION: Performed by: RADIOLOGY

## 2024-11-25 PROCEDURE — 93010 ELECTROCARDIOGRAM REPORT: CPT | Performed by: SPECIALIST

## 2024-11-25 PROCEDURE — 99285 EMERGENCY DEPT VISIT HI MDM: CPT

## 2024-11-25 PROCEDURE — 2580000003 HC RX 258: Performed by: NURSE PRACTITIONER

## 2024-11-25 PROCEDURE — 84484 ASSAY OF TROPONIN QUANT: CPT

## 2024-11-25 PROCEDURE — 99221 1ST HOSP IP/OBS SF/LOW 40: CPT | Performed by: NURSE PRACTITIONER

## 2024-11-25 PROCEDURE — 74177 CT ABD & PELVIS W/CONTRAST: CPT

## 2024-11-25 PROCEDURE — 96375 TX/PRO/DX INJ NEW DRUG ADDON: CPT

## 2024-11-25 PROCEDURE — 96361 HYDRATE IV INFUSION ADD-ON: CPT

## 2024-11-25 RX ORDER — IOPAMIDOL 755 MG/ML
100 INJECTION, SOLUTION INTRAVASCULAR
Status: COMPLETED | OUTPATIENT
Start: 2024-11-25 | End: 2024-11-25

## 2024-11-25 RX ORDER — SODIUM CHLORIDE 9 MG/ML
INJECTION, SOLUTION INTRAVENOUS PRN
Status: DISCONTINUED | OUTPATIENT
Start: 2024-11-25 | End: 2024-11-26 | Stop reason: HOSPADM

## 2024-11-25 RX ORDER — ASPIRIN 81 MG/1
81 TABLET, CHEWABLE ORAL DAILY
Status: DISCONTINUED | OUTPATIENT
Start: 2024-11-25 | End: 2024-11-26 | Stop reason: HOSPADM

## 2024-11-25 RX ORDER — POTASSIUM CHLORIDE 750 MG/1
40 TABLET, EXTENDED RELEASE ORAL PRN
Status: DISCONTINUED | OUTPATIENT
Start: 2024-11-25 | End: 2024-11-26 | Stop reason: HOSPADM

## 2024-11-25 RX ORDER — ACETAMINOPHEN 500 MG
1000 TABLET ORAL EVERY 6 HOURS PRN
Status: DISCONTINUED | OUTPATIENT
Start: 2024-11-25 | End: 2024-11-26 | Stop reason: HOSPADM

## 2024-11-25 RX ORDER — ACETAMINOPHEN 325 MG/1
650 TABLET ORAL EVERY 6 HOURS PRN
Status: DISCONTINUED | OUTPATIENT
Start: 2024-11-25 | End: 2024-11-25

## 2024-11-25 RX ORDER — HYDROMORPHONE HYDROCHLORIDE 1 MG/ML
0.25 INJECTION, SOLUTION INTRAMUSCULAR; INTRAVENOUS; SUBCUTANEOUS
Status: DISCONTINUED | OUTPATIENT
Start: 2024-11-25 | End: 2024-11-26 | Stop reason: HOSPADM

## 2024-11-25 RX ORDER — ONDANSETRON 4 MG/1
4 TABLET, ORALLY DISINTEGRATING ORAL EVERY 8 HOURS PRN
Status: DISCONTINUED | OUTPATIENT
Start: 2024-11-25 | End: 2024-11-25

## 2024-11-25 RX ORDER — HYDROMORPHONE HYDROCHLORIDE 1 MG/ML
0.5 INJECTION, SOLUTION INTRAMUSCULAR; INTRAVENOUS; SUBCUTANEOUS
Status: DISCONTINUED | OUTPATIENT
Start: 2024-11-25 | End: 2024-11-26 | Stop reason: HOSPADM

## 2024-11-25 RX ORDER — MAGNESIUM SULFATE IN WATER 40 MG/ML
2000 INJECTION, SOLUTION INTRAVENOUS PRN
Status: DISCONTINUED | OUTPATIENT
Start: 2024-11-25 | End: 2024-11-26 | Stop reason: HOSPADM

## 2024-11-25 RX ORDER — OXYCODONE HYDROCHLORIDE 5 MG/1
10 TABLET ORAL EVERY 4 HOURS PRN
Status: DISCONTINUED | OUTPATIENT
Start: 2024-11-25 | End: 2024-11-26 | Stop reason: HOSPADM

## 2024-11-25 RX ORDER — SODIUM CHLORIDE 0.9 % (FLUSH) 0.9 %
5-40 SYRINGE (ML) INJECTION EVERY 12 HOURS SCHEDULED
Status: DISCONTINUED | OUTPATIENT
Start: 2024-11-25 | End: 2024-11-26 | Stop reason: HOSPADM

## 2024-11-25 RX ORDER — POTASSIUM CHLORIDE 7.45 MG/ML
10 INJECTION INTRAVENOUS PRN
Status: DISCONTINUED | OUTPATIENT
Start: 2024-11-25 | End: 2024-11-26 | Stop reason: HOSPADM

## 2024-11-25 RX ORDER — SODIUM CHLORIDE, SODIUM LACTATE, POTASSIUM CHLORIDE, CALCIUM CHLORIDE 600; 310; 30; 20 MG/100ML; MG/100ML; MG/100ML; MG/100ML
INJECTION, SOLUTION INTRAVENOUS CONTINUOUS
Status: DISCONTINUED | OUTPATIENT
Start: 2024-11-25 | End: 2024-11-26

## 2024-11-25 RX ORDER — METRONIDAZOLE 500 MG/100ML
500 INJECTION, SOLUTION INTRAVENOUS EVERY 8 HOURS
Status: DISCONTINUED | OUTPATIENT
Start: 2024-11-25 | End: 2024-11-26

## 2024-11-25 RX ORDER — POLYETHYLENE GLYCOL 3350 17 G/17G
17 POWDER, FOR SOLUTION ORAL DAILY PRN
Status: DISCONTINUED | OUTPATIENT
Start: 2024-11-25 | End: 2024-11-26 | Stop reason: HOSPADM

## 2024-11-25 RX ORDER — ONDANSETRON 4 MG/1
4 TABLET, ORALLY DISINTEGRATING ORAL EVERY 6 HOURS PRN
Status: DISCONTINUED | OUTPATIENT
Start: 2024-11-25 | End: 2024-11-26 | Stop reason: HOSPADM

## 2024-11-25 RX ORDER — MORPHINE SULFATE 4 MG/ML
4 INJECTION, SOLUTION INTRAMUSCULAR; INTRAVENOUS
Status: COMPLETED | OUTPATIENT
Start: 2024-11-25 | End: 2024-11-25

## 2024-11-25 RX ORDER — SODIUM CHLORIDE 0.9 % (FLUSH) 0.9 %
5-40 SYRINGE (ML) INJECTION PRN
Status: DISCONTINUED | OUTPATIENT
Start: 2024-11-25 | End: 2024-11-26 | Stop reason: HOSPADM

## 2024-11-25 RX ORDER — HYDROMORPHONE HYDROCHLORIDE 1 MG/ML
1 INJECTION, SOLUTION INTRAMUSCULAR; INTRAVENOUS; SUBCUTANEOUS
Status: COMPLETED | OUTPATIENT
Start: 2024-11-25 | End: 2024-11-25

## 2024-11-25 RX ORDER — OXYCODONE HYDROCHLORIDE 5 MG/1
5 TABLET ORAL EVERY 4 HOURS PRN
Status: DISCONTINUED | OUTPATIENT
Start: 2024-11-25 | End: 2024-11-26 | Stop reason: HOSPADM

## 2024-11-25 RX ORDER — ONDANSETRON 2 MG/ML
4 INJECTION INTRAMUSCULAR; INTRAVENOUS ONCE
Status: COMPLETED | OUTPATIENT
Start: 2024-11-25 | End: 2024-11-25

## 2024-11-25 RX ORDER — ONDANSETRON 2 MG/ML
4 INJECTION INTRAMUSCULAR; INTRAVENOUS EVERY 6 HOURS PRN
Status: DISCONTINUED | OUTPATIENT
Start: 2024-11-25 | End: 2024-11-25

## 2024-11-25 RX ORDER — PROCHLORPERAZINE EDISYLATE 5 MG/ML
10 INJECTION INTRAMUSCULAR; INTRAVENOUS EVERY 6 HOURS PRN
Status: DISCONTINUED | OUTPATIENT
Start: 2024-11-25 | End: 2024-11-26 | Stop reason: HOSPADM

## 2024-11-25 RX ORDER — ACETAMINOPHEN 650 MG/1
650 SUPPOSITORY RECTAL EVERY 6 HOURS PRN
Status: DISCONTINUED | OUTPATIENT
Start: 2024-11-25 | End: 2024-11-25

## 2024-11-25 RX ORDER — 0.9 % SODIUM CHLORIDE 0.9 %
1000 INTRAVENOUS SOLUTION INTRAVENOUS ONCE
Status: COMPLETED | OUTPATIENT
Start: 2024-11-25 | End: 2024-11-25

## 2024-11-25 RX ADMIN — Medication 10 ML: at 10:27

## 2024-11-25 RX ADMIN — IOPAMIDOL 100 ML: 755 INJECTION, SOLUTION INTRAVENOUS at 04:39

## 2024-11-25 RX ADMIN — MORPHINE SULFATE 4 MG: 4 INJECTION, SOLUTION INTRAMUSCULAR; INTRAVENOUS at 03:40

## 2024-11-25 RX ADMIN — PANTOPRAZOLE SODIUM 40 MG: 40 INJECTION, POWDER, FOR SOLUTION INTRAVENOUS at 09:53

## 2024-11-25 RX ADMIN — HYDROMORPHONE HYDROCHLORIDE 1 MG: 1 INJECTION, SOLUTION INTRAMUSCULAR; INTRAVENOUS; SUBCUTANEOUS at 03:51

## 2024-11-25 RX ADMIN — CEFTRIAXONE SODIUM 1000 MG: 1 INJECTION, POWDER, FOR SOLUTION INTRAMUSCULAR; INTRAVENOUS at 10:00

## 2024-11-25 RX ADMIN — METRONIDAZOLE 500 MG: 500 INJECTION, SOLUTION INTRAVENOUS at 10:08

## 2024-11-25 RX ADMIN — METRONIDAZOLE 500 MG: 500 INJECTION, SOLUTION INTRAVENOUS at 17:13

## 2024-11-25 RX ADMIN — ONDANSETRON 4 MG: 2 INJECTION INTRAMUSCULAR; INTRAVENOUS at 03:38

## 2024-11-25 RX ADMIN — SODIUM CHLORIDE, POTASSIUM CHLORIDE, SODIUM LACTATE AND CALCIUM CHLORIDE: 600; 310; 30; 20 INJECTION, SOLUTION INTRAVENOUS at 21:26

## 2024-11-25 RX ADMIN — SODIUM CHLORIDE, POTASSIUM CHLORIDE, SODIUM LACTATE AND CALCIUM CHLORIDE: 600; 310; 30; 20 INJECTION, SOLUTION INTRAVENOUS at 07:14

## 2024-11-25 RX ADMIN — SODIUM CHLORIDE 1000 ML: 9 INJECTION, SOLUTION INTRAVENOUS at 03:49

## 2024-11-25 ASSESSMENT — PAIN - FUNCTIONAL ASSESSMENT
PAIN_FUNCTIONAL_ASSESSMENT: 0-10
PAIN_FUNCTIONAL_ASSESSMENT: PREVENTS OR INTERFERES SOME ACTIVE ACTIVITIES AND ADLS

## 2024-11-25 ASSESSMENT — PAIN DESCRIPTION - PAIN TYPE: TYPE: ACUTE PAIN

## 2024-11-25 ASSESSMENT — PAIN SCALES - GENERAL
PAINLEVEL_OUTOF10: 9
PAINLEVEL_OUTOF10: 10
PAINLEVEL_OUTOF10: 1
PAINLEVEL_OUTOF10: 1

## 2024-11-25 ASSESSMENT — PAIN DESCRIPTION - LOCATION
LOCATION: ABDOMEN;CHEST
LOCATION: ABDOMEN
LOCATION: ABDOMEN;CHEST

## 2024-11-25 ASSESSMENT — PAIN DESCRIPTION - DESCRIPTORS
DESCRIPTORS: ACHING
DESCRIPTORS: SHARP;GNAWING

## 2024-11-25 ASSESSMENT — PAIN DESCRIPTION - ONSET: ONSET: SUDDEN

## 2024-11-25 ASSESSMENT — PAIN DESCRIPTION - FREQUENCY: FREQUENCY: CONTINUOUS

## 2024-11-25 ASSESSMENT — PAIN DESCRIPTION - ORIENTATION: ORIENTATION: UPPER

## 2024-11-25 NOTE — ED NOTES
Naun EVANS at the bedside  
Naun EVANS at the bedside and explained regarding discontinued transfer in [facility] and patient will stay and be admitted in the hospital  
Offered some ice chips to patient. Provided comfort by giving warm blanket this time  
Patient is at the ct scan now  
Patient placed in trendelenburg position. Anticipated vasovagal syncope. Started fluid for hydration. This RN is with Naun EVANS and Vita THOMAS at the bedside. Patient complaints of pain again this time  
Patient was placed to oxygen via nasal cannula at 2 liters per minute. Vital signs closely monitored  
This RN introduced self to patient and informed regarding ED waiting time, laboratory/diagnostic results and flow process.  
Vamsi Milligram at the bedside  
Verbal shift change report given to Vanita RN (oncoming nurse) by Anant RN (offgoing nurse). Report included the following information Nurse Handoff Report, Index, ED Encounter Summary, ED SBAR, Adult Overview, and MAR.    
11/25/24 0323   Line Care Cap changed 11/25/24 0323   Phlebitis Assessment No symptoms 11/25/24 0323   Infiltration Assessment 0 11/25/24 0323   Alcohol Cap Used Yes 11/25/24 0323   Dressing Status New dressing applied 11/25/24 0323   Dressing Type Securing device 11/25/24 0323   Dressing Intervention New 11/25/24 0323     PO Status: Nothing by Mouth  Pertinent or High Risk Medications/Drips: no   If Yes, please provide details:   Titratable drips: no   Pending Blood Product Administration: no     Sepsis    Sepsis Risk Score   Predictive Model Details          12 (Low)  Factor Value    Calculated 11/25/2024 13:06 11% Total Active Inpatient Meds 20    [facility] EARLY DETECTION OF SEPSIS VERSION 2 Model 10% O2 Delivery Method ROOM AIR     8% Age 69 years old     7% Platelet Count 377 K/uL     -6% Duration of Encounter .4 days     4% Has Imaging Procedure present     3% Antiemetic Admins Last 24 Hours 1     -3% AST 11 U/L     -3% Chloride 103 mmol/L     3% Bilirubin 0.9 MG/DL      Recent Labs     11/25/24 0320   WBC 12.2*     Blood Cultures Drawn: No   Repeat LA: Time Due   Antibiotic Given: Yes  Fluid Resuscitation: Total needed , Status    VS x 2 post-fluid resuscitation:     Recommendation    Pending orders   Consults ordered: IP CONSULT TO GENERAL SURGERY  IP CONSULT TO GENERAL SURGERY  IP CONSULT TO GI    Consulted provider:      Plan for next 24 hours: monitoring  Additional Comments:      If any further questions, please call Sending RN at [phone]  Electronically signed by: Electronically signed by Sharon Mehta RN on 11/25/2024 at 1:13 PM

## 2024-11-25 NOTE — ED PROVIDER NOTES
[facility] EMERGENCY DEP  EMERGENCY DEPARTMENT ENCOUNTER      Pt Name: Trenton Maurice  MRN: [id number]  Birthdate 1955  Date of evaluation: 11/25/2024  Provider: oTmas Magallon MD    CHIEF COMPLAINT       Chief Complaint   Patient presents with    Abdominal Pain       EMERGENCY DEPARTMENT COURSE and DIFFERENTIAL DIAGNOSIS/MDM:   Medical Decision Making  69-year-old male presenting ER with complaint of abdominal pain that started this evening.  Patient has history of recent laparoscopic hernia repair on November 7.  Patient reports that he had been recovering well when he started having pain.  Initially thought it was indigestion and tried taking some antacids but the pain has been worsening.  Patient took oxycodone but symptoms not improving.  Reports feeling bloated and distended.  Reports nausea without vomiting.  Denies any diarrhea constipation still passing gas.  Denies any urinary symptoms.  No chest pain.  No numbness ting weakness in extremities.  Pain does not radiate.  No dizziness lightheadedness.  On exam patient in mild distress having pain, abdominal incisions appear to be healing well with no signs of erythema or induration.  Mild abdominal bloating/distention with tenderness in the epigastric region and periumbilical region.  No CVA tenderness.  Normal pulses throughout.  Lungs are clear to auscultation.  EKG with no signs of ischemia or dysrhythmias.  Patient's symptoms concerning for possible bowel obstruction however possible intra-abdominal abscess or postop complication.  Possible GERD versus biliary pathology.    CAT scan showing small bowel obstruction with signs possibly due to enteritis.  Patient was not having any symptoms of enteritis or episodes of diarrhea prior to onset of the symptoms.  Mild leukocytosis with no Tiffany abnormalities normal kidney function, LFTs and lipase.  Patient's symptoms improved with pain meds antiemetics and fluids.  Spoke with general surgery at [facility].

## 2024-11-25 NOTE — ED TRIAGE NOTES
Pt arrives from home via EMS with cc of abd pain that started 2-3 hours ago. Pt had hernia surgery approx 2 weeks ago. Pt has multiple incisions in abd that look clean and dry.

## 2024-11-25 NOTE — CONSULTS
Patient seen at request of Dr. Stapleton.     Information obtained from patient and review of chart.     Trenton Maurice is an 69 y.o. male who presents with abdominal pain. Mr. Maurice is s/p robotic assisted laparoscopic right inguinal hernia repair with mesh and incisional hernia repair with mesh on November 7, 2024. Discharged to home that day. Seen by Dr. Guevara on November 22, 2024 at which time he was felt to be doing well post-operatively. Mr. Maurice developed epigastric abdominal pain yesterday. No associated fevers or chills. No nausea or vomiting. Mr. Maurice has been taking NSAIDS. Last bowel movement was yesterday and was normal. No h/o dysuria or hematuria.    He has otherwise been in his usual state of health.      CT scan abdomen/pelvis with IV contrast - Small bowel obstruction likely due to distal small bowel enteritis with gradual tapering of the bowel in an area of scattered small bowel wall thickening and luminal narrowing. Additionally, the proximal colon is fluid-filled and there is transverse colon wall thickening suggesting infection. Small amount of ascites.    Allergies - Patient has no known allergies.    Meds - Reviewed.    PMH -   Past Medical History:   Diagnosis Date    CAD (coronary artery disease)     VCS - Dr. Nye    Hyperlipidemia     Prostate cancer (HCC)      PSH -   Past Surgical History:   Procedure Laterality Date    ANGIOPLASTY      several  6 STENTS    APPENDECTOMY      in 8th grade    HERNIA REPAIR Left     HERNIA REPAIR N/A 11/7/2024    ROBOTIC ASSISTED LAPAROSCOPIC RIGHT INGUINAL HERNIA REPAIR WITH MESH AND INCISIONAL HERNIA REPAIR WITH MESH performed by Nicko Guevara MD at [facility] MAIN OR    TONSILLECTOMY      WISDOM TOOTH EXTRACTION       Fam Hx -   Family History   Problem Relation Age of Onset    Lung Disease Mother     Heart Disease Father     Colon Cancer Father     Prostate Cancer Father      Soc Hx -   Social History     Tobacco Use    Smoking 
[facility]urgical [facility]ists  ER consult    Admit Date: 11/25/2024  Reason for Consultation: SBO and enteritis    HPI:  Trenton Maurice is a 69 y.o. male w/ hx recent R inguinal hernia repair w/ mesh presents to the ED w/ c/o periumbilical pain that started yesterday.  He describes the pain as \"boring\" and constant.  No n/v, last BM yesterday and normal, no gas today. No f/c.      CT  IMPRESSION:  Small bowel obstruction likely due to distal small bowel enteritis with gradual  tapering of the bowel in an area of scattered small bowel wall thickening and  luminal narrowing. Additionally, the proximal colon is fluid-filled and there is  transverse colon wall thickening suggesting infection. Small amount of ascites.      Patient Active Problem List    Diagnosis Date Noted    Enteritis 11/25/2024    Skin lesion of left ear 09/12/2024    Incisional hernia 09/10/2024    Right inguinal hernia 07/23/2024    Ventral hernia without obstruction or gangrene 07/23/2024    Alcohol screening 09/20/2023    COVID-19 03/02/2022    Prostate CA (HCC) 03/02/2022    Benign prostatic hyperplasia with nocturia 08/19/2020    Glucose intolerance 08/18/2020    Fatigue 07/17/2019    Vitamin D deficiency 07/17/2019    ASCVD (arteriosclerotic cardiovascular disease) 07/16/2019    Mixed hyperlipidemia 07/16/2019    Intractable low back pain 05/18/2018    Lumbar disc herniation 05/18/2018     Past Medical History:   Diagnosis Date    CAD (coronary artery disease)     VCS - Dr. Nye    Hyperlipidemia     Prostate cancer (HCC)       Past Surgical History:   Procedure Laterality Date    ANGIOPLASTY      several  6 STENTS    APPENDECTOMY      in 8th grade    HERNIA REPAIR Left     HERNIA REPAIR N/A 11/7/2024    ROBOTIC ASSISTED LAPAROSCOPIC RIGHT INGUINAL HERNIA REPAIR WITH MESH AND INCISIONAL HERNIA REPAIR WITH MESH performed by Nicko Guevara MD at [facility] MAIN OR    TONSILLECTOMY      WISDOM TOOTH EXTRACTION        Social History     Tobacco Use 
and oriented x 3   HEENT: EOMI, no scleral icterus   LUNGS: No acute respiratory distress   CARD:  S1 S2   ABD:  Soft, non distended, no tenderness, no rebound, no guarding. + Bowel sounds.   EXT:  Warm   PSYCH: Full, not anxious or agitated     Data Review     Recent Labs     11/25/24  0320   WBC 12.2*   HGB 16.2   HCT 46.5        Recent Labs     11/25/24  0320      K 4.0      CO2 28   BUN 17     Recent Labs     11/25/24  0320   GLOB 3.5     No results for input(s): \"INR\", \"APTT\" in the last 72 hours.    Invalid input(s): \"PTP\"    Colonoscopy 12/2020 by Dr. Amador showed polyps in the descending and sigmoid colon status post polypectomy; erosions and erythema in the rectum; grade I internal hemorrhoids.  Pathology showed 2 tubular adenomas and a hyperplastic polyp; pathology of the rectum showed focal active proctitis.  A repeat colonoscopy was recommended in 7 years.    Assessment:     Enteritis: CT as below. Colonoscopy in 12/2020 as above. Differential includes infectious, inflammatory, NSAIDs, IBD, and malignancy.   Epigastric abdominal pain: WBC 12.2, Hgb 16.2, LFTs normal, lipase normal. CT abdomen/pelvis with IV contrast (11/25/24): small bowel obstruction likely due to distal small bowel enteritis with gradual tapering of the bowel in an area of scattered small bowel wall thickening and luminal narrowing; additionally, the proximal colon is fluid-filled and there is transverse colon wall thickening suggesting infection; small amount of ascites.   History of coronary artery disease  Hyperlipidemia  Prostate cancer     Patient Active Problem List   Diagnosis    Fatigue    Glucose intolerance    COVID-19    Vitamin D deficiency    ASCVD (arteriosclerotic cardiovascular disease)    Intractable low back pain    Prostate CA (HCC)    Benign prostatic hyperplasia with nocturia    Lumbar disc herniation    Mixed hyperlipidemia    Alcohol screening    Right inguinal hernia    Ventral hernia

## 2024-11-25 NOTE — H&P
History and Physical    Date of Service:  11/25/2024  Primary Care Provider: Leighton Dillon MD  Source of information: Patient, Chart Review    Chief Complaint: Abdominal Pain      History of Presenting Illness:   Trenton Maurice is a 69 y.o. male with a PMHx of CAD s/p PCI and stent placement x6 in 2020, HLD and Prostate cancer. He has a surgical history notable for R inguinal hernia repair on 11/7/2024.     He presents to the ED with abdominal pain that began last night. He reports feeling indigestion and some abdominal bloating which progressed to pain that is linear across his abdomen just above his umbilicus. He reports associated nausea with no vomiting. His last BM was yesterday and was normal. He denies any fevers, chills or rigors. He does note that he experienced similar symptoms requiring hospitalization after his L inguinal repair several years ago. Hospitalist consulted for admission.    ED Workup  - CT Abd/pelvis: Small bowel obstruction likely due to distal small bowel enteritis with gradual tapering of the bowel in an area of scattered small bowel wall thickening and  luminal narrowing. Additionally, the proximal colon is fluid-filled and there is  transverse colon wall thickening suggesting infection. Small amount of ascites.  - WBC: 12.2    ED Rx  - Morphine x1  - Hydromorphone x1  - Zofran x1  - 1L NS bolus     REVIEW OF SYSTEMS:  A comprehensive review of systems was negative except for that written in the History of Present Illness.     Past Medical History:   Diagnosis Date    CAD (coronary artery disease)     VCS - Dr. Nye    Hyperlipidemia     Prostate cancer (HCC)       Past Surgical History:   Procedure Laterality Date    ANGIOPLASTY      several  6 STENTS    APPENDECTOMY      in 8th grade    HERNIA REPAIR Left     HERNIA REPAIR N/A 11/7/2024    ROBOTIC ASSISTED LAPAROSCOPIC RIGHT INGUINAL HERNIA REPAIR WITH MESH AND INCISIONAL HERNIA REPAIR WITH MESH performed by

## 2024-11-25 NOTE — CARE COORDINATION
Care Management Initial Assessment       RUR:8%  Readmission? No  1st IM letter given? Yes - 11/25/24  1st  letter given: No, N/A    69 year old male.  Patient with a PMHx of CAD s/p PCI and stent placement x6 in 2020, HLD and Prostate cancer. He has a surgical history notable for R inguinal hernia repair on 11/7/2024.   Patient currently admitted for Enteritis.    General Surgery and GI are consulted and following.    Insurances verified: Medicare A[employer]B/[employer].  [facility] pharmacy located off [facility] is utilized for prescriptions.  No history of HH/IPR/SNF.  Family able to assist with transport at discharge.    CM will continue to follow and assist with AMANDA needs as they arise.     11/25/24 0942   Service Assessment   Patient Orientation Alert and Oriented   Cognition Alert   History Provided By Patient   Primary Caregiver Self   Accompanied By/Relationship Son   Support Systems Spouse/Significant Other;Children;Family Members   Patient's Healthcare Decision Maker is: Legal Next of Kin  (Spouse: Naima Darneller [phone])   PCP Verified by CM Yes   Last Visit to PCP Within last 3 months   Prior Functional Level Independent in ADLs/IADLs   Current Functional Level Independent in ADLs/IADLs   Can patient return to prior living arrangement Yes   Ability to make needs known: Good   Family able to assist with home care needs: Yes   Would you like for me to discuss the discharge plan with any other family members/significant others, and if so, who? Yes  (Spouse: Naima Trainer)   Financial Resources Medicare;Other (Comment)  (Receives SS and pension as source of income.  No significant financial stressors or concerns.)   Social/Functional History   Lives With Spouse   Type of Home House   Home Layout Two level   Home Access Stairs to enter with rails   Entrance Stairs - Number of Steps 8   Entrance Stairs - Rails Both   Bathroom Toilet Standard   Bathroom Accessibility Accessible   Home Equipment None

## 2024-11-26 VITALS
RESPIRATION RATE: 18 BRPM | HEIGHT: 67 IN | OXYGEN SATURATION: 98 % | HEART RATE: 71 BPM | SYSTOLIC BLOOD PRESSURE: 104 MMHG | WEIGHT: 141.31 LBS | BODY MASS INDEX: 22.18 KG/M2 | TEMPERATURE: 98.1 F | DIASTOLIC BLOOD PRESSURE: 74 MMHG

## 2024-11-26 PROCEDURE — 6370000000 HC RX 637 (ALT 250 FOR IP): Performed by: SURGERY

## 2024-11-26 PROCEDURE — 6370000000 HC RX 637 (ALT 250 FOR IP): Performed by: PHYSICIAN ASSISTANT

## 2024-11-26 PROCEDURE — 2580000003 HC RX 258: Performed by: NURSE PRACTITIONER

## 2024-11-26 PROCEDURE — 99231 SBSQ HOSP IP/OBS SF/LOW 25: CPT | Performed by: SURGERY

## 2024-11-26 PROCEDURE — 6360000002 HC RX W HCPCS: Performed by: PHYSICIAN ASSISTANT

## 2024-11-26 PROCEDURE — 6360000002 HC RX W HCPCS: Performed by: NURSE PRACTITIONER

## 2024-11-26 PROCEDURE — 2580000003 HC RX 258: Performed by: PHYSICIAN ASSISTANT

## 2024-11-26 RX ORDER — PANTOPRAZOLE SODIUM 40 MG/1
40 TABLET, DELAYED RELEASE ORAL
Status: DISCONTINUED | OUTPATIENT
Start: 2024-11-26 | End: 2024-11-26 | Stop reason: HOSPADM

## 2024-11-26 RX ADMIN — METRONIDAZOLE 500 MG: 500 INJECTION, SOLUTION INTRAVENOUS at 00:39

## 2024-11-26 RX ADMIN — ACETAMINOPHEN 1000 MG: 500 TABLET ORAL at 00:33

## 2024-11-26 RX ADMIN — Medication 10 ML: at 08:11

## 2024-11-26 RX ADMIN — METRONIDAZOLE 500 MG: 500 INJECTION, SOLUTION INTRAVENOUS at 08:14

## 2024-11-26 RX ADMIN — ASPIRIN 81 MG CHEWABLE TABLET 81 MG: 81 TABLET CHEWABLE at 08:11

## 2024-11-26 RX ADMIN — CEFTRIAXONE SODIUM 1000 MG: 1 INJECTION, POWDER, FOR SOLUTION INTRAMUSCULAR; INTRAVENOUS at 08:11

## 2024-11-26 RX ADMIN — PANTOPRAZOLE SODIUM 40 MG: 40 INJECTION, POWDER, FOR SOLUTION INTRAVENOUS at 08:11

## 2024-11-26 ASSESSMENT — PAIN SCALES - GENERAL
PAINLEVEL_OUTOF10: 4
PAINLEVEL_OUTOF10: 2
PAINLEVEL_OUTOF10: 2

## 2024-11-26 ASSESSMENT — PAIN DESCRIPTION - LOCATION: LOCATION: HEAD

## 2024-11-26 NOTE — PROGRESS NOTES
[facility] [address] [address]       GI PROGRESS NOTE  Will LUIS CARLOS Mcdaniels  [phone] office      NAME: Trenton Maurice   :  1955   MRN:  [id number]       Subjective:   Patient reports feeling much better.  He reports some mild abdominal discomfort.  He has not required any further pain medication.  No nausea or vomiting.  He reports resolution of abdominal bloating.  Passing flatus.  He reports having a formed bowel movement today.  He was started on a regular diet this morning and recently finished eating breakfast.     Objective:     VITALS:   Last 24hrs VS reviewed since prior progress note. Most recent are:  Vitals:    24 1000   BP:    Pulse: 66   Resp:    Temp:    SpO2:      PHYSICAL EXAM:  General: Cooperative, no acute distress    Neurologic:  Alert and oriented X 3  HEENT: EOMI, no scleral icterus   Lungs:  No acute respiratory distress  Heart:  S1 S2  Abdomen: Soft, non-distended, mild generalized tenderness, no guarding, no rebound. +Bowel sounds.   Extremities: Warm  Psych:   Good insight. Not anxious or agitated.    Lab Data Reviewed:     No results found for this or any previous visit (from the past 24 hour(s)).    Colonoscopy 2020 by Dr. Amador showed polyps in the descending and sigmoid colon status post polypectomy; erosions and erythema in the rectum; grade I internal hemorrhoids.  Pathology showed 2 tubular adenomas and a hyperplastic polyp; pathology of the rectum showed focal active proctitis.  A repeat colonoscopy was recommended in 7 years.    Assessment:     Enteritis: CT as below. Colonoscopy in 2020 as above.  Epigastric abdominal pain: CT abdomen/pelvis with IV contrast (24): small bowel obstruction likely due to distal small bowel enteritis with gradual tapering of the bowel in an area of scattered small bowel wall thickening and luminal narrowing; additionally, the proximal colon is fluid-filled and there is

## 2024-11-26 NOTE — DISCHARGE SUMMARY
days PLEASE SEE ATTACHED FOR DETAILED DIRECTIONS     tadalafil 5 MG tablet  Commonly known as: CIALIS     vitamin D 25 MCG (1000 UT) Caps               Where to Get Your Medications        These medications were sent to [facility]/pharmacy #[id number] - [place], [place] - [address] - P [phone] - F [phone]  [address] [address]      Phone: [phone]   amoxicillin-clavulanate 875-125 MG per tablet           NOTIFY YOUR PHYSICIAN FOR ANY OF THE FOLLOWING:   Fever over 101 degrees for 24 hours.   Chest pain, shortness of breath, fever, chills, nausea, vomiting, diarrhea, change in mentation, falling, weakness, bleeding. Severe pain or pain not relieved by medications.  Or, any other signs or symptoms that you may have questions about.    DISPOSITION:   x Home With:   OT  PT  HH  RN       Long term SNF/Inpatient Rehab    Independent/assisted living    Hospice    Other:       PATIENT CONDITION AT DISCHARGE:     Functional status    Poor     Deconditioned    x Independent      Cognition    x Lucid     Forgetful     Dementia      Catheters/lines (plus indication)    [place]     PICC     PEG    x None      Code status    x Full code     DNR      PHYSICAL EXAMINATION AT DISCHARGE:    General : alert x 3, awake, no acute distress,   HEENT: PEERL, EOMI, moist mucus membrane  Neck: supple, no JVD, no meningeal signs  Chest: Clear to auscultation bilaterally   CVS: S1 S2 heard, Capillary refill less than 2 seconds  Abd: soft/ Non tender, non distended, BS physiological,   Ext: no clubbing, no cyanosis, no edema, brisk 2+ DP pulses  Neuro/Psych: pleasant mood and affect, CN 2-12 grossly intact, sensory grossly within normal limit, Strength 5/5 in all extremities  Skin: warm     CHRONIC MEDICAL DIAGNOSES:  Principal Problem:    Enteritis  Resolved Problems:    * No resolved hospital problems. *        Greater than 31 minutes were spent with the patient on counseling and coordination of care    Signed:

## 2024-11-26 NOTE — PROGRESS NOTES
Mr. Maurice is feeling better today. No further abdominal pain. Tolerating clear liquids.   Tm 98.6 Tc 97.2 HR: 61 BP: 61 Resp Rate: 16 99% sat on room air.    Intake/Output Summary (Last 24 hours) at 11/26/2024 0832  Last data filed at 11/25/2024 1108  Gross per 24 hour   Intake 95.45 ml   Output --   Net 95.45 ml   Exam: Cor: RRR.              Lungs: Bilateral breath sounds.               Abd: Soft. Non distended.             Non tender.             No guarding or rebound.             Incisions clean.   Labs: No results found for this or any previous visit (from the past 12 hour(s)).   Mr. Maurice is doing well. Clinically improved.   GI input - noted.  Advance diet to regular.   Saline lock IVF.   Stop IV antibiotics.   Continue Protonix.   OOB, Ambulate.   Can discharge when ready.   Plans per Dr. Stapleton.

## 2024-11-27 ENCOUNTER — TELEPHONE (OUTPATIENT)
Facility: CLINIC | Age: 69
End: 2024-11-27

## 2024-11-27 NOTE — TELEPHONE ENCOUNTER
Leighton Dillon MD     The above patient was discharged on 11/26/24 from [facility] with a diagnosis of enteritis. He does not have a AMANDA appt scheduled. Please call the patient within the required two business days, and document that call using the below smartphrase: .[place].    Thank you.

## 2024-11-27 NOTE — TELEPHONE ENCOUNTER
Care Transitions Initial Follow Up Call    Outreach made within 2 business days of discharge: Yes    Patient: Trenton Maurice Patient : 1955   MRN: [id number]  Reason for Admission: Enteritis  Discharge Date: 24       Spoke with: Trenton Maurice    Discharge department/facility: [facility]    TCM Interactive Patient Contact:  Was patient able to fill all prescriptions: Yes  Was patient instructed to bring all medications to the follow-up visit: Yes  Is patient taking all medications as directed in the discharge summary? Yes  Does patient understand their discharge instructions: Yes  Does patient have questions or concerns that need addressed prior to 7-14 day follow up office visit: no    Additional needs identified to be addressed with provider  No needs identified   Patient stated that he has a follow up appointment with the GI Doctor and he will give us a call back if he needs to schedule an appointment with Dr. Dillno he doesn't see the need for seeing Dr. Dillon at the moment.           Scheduled appointment with PCP within 7-14 days    Follow Up  Future Appointments   Date Time Provider Department Center   3/12/2025  8:10 AM Leighton Dillon MD [facility] DEP       Abner Hoyt MA

## 2024-12-02 ENCOUNTER — TELEPHONE (OUTPATIENT)
Facility: CLINIC | Age: 69
End: 2024-12-02

## 2024-12-02 PROBLEM — K56.609 SMALL BOWEL OBSTRUCTION (HCC): Status: ACTIVE | Noted: 2024-12-02

## 2024-12-02 SDOH — ECONOMIC STABILITY: FOOD INSECURITY: WITHIN THE PAST 12 MONTHS, YOU WORRIED THAT YOUR FOOD WOULD RUN OUT BEFORE YOU GOT MONEY TO BUY MORE.: NEVER TRUE

## 2024-12-02 SDOH — ECONOMIC STABILITY: INCOME INSECURITY: HOW HARD IS IT FOR YOU TO PAY FOR THE VERY BASICS LIKE FOOD, HOUSING, MEDICAL CARE, AND HEATING?: NOT HARD AT ALL

## 2024-12-02 SDOH — ECONOMIC STABILITY: FOOD INSECURITY: WITHIN THE PAST 12 MONTHS, THE FOOD YOU BOUGHT JUST DIDN'T LAST AND YOU DIDN'T HAVE MONEY TO GET MORE.: NEVER TRUE

## 2024-12-03 ENCOUNTER — OFFICE VISIT (OUTPATIENT)
Facility: CLINIC | Age: 69
End: 2024-12-03

## 2024-12-03 VITALS
TEMPERATURE: 97.8 F | BODY MASS INDEX: 22.29 KG/M2 | HEART RATE: 64 BPM | DIASTOLIC BLOOD PRESSURE: 78 MMHG | SYSTOLIC BLOOD PRESSURE: 112 MMHG | WEIGHT: 142 LBS | OXYGEN SATURATION: 97 % | HEIGHT: 67 IN

## 2024-12-03 DIAGNOSIS — Z09 HOSPITAL DISCHARGE FOLLOW-UP: ICD-10-CM

## 2024-12-03 DIAGNOSIS — Z23 NEEDS FLU SHOT: ICD-10-CM

## 2024-12-03 DIAGNOSIS — K56.609 SMALL BOWEL OBSTRUCTION (HCC): Primary | ICD-10-CM

## 2024-12-03 DIAGNOSIS — K40.90 RIGHT INGUINAL HERNIA: ICD-10-CM

## 2024-12-03 DIAGNOSIS — K52.9 ENTERITIS: ICD-10-CM

## 2024-12-03 LAB
BASOPHILS # BLD: 0.1 K/UL (ref 0–0.1)
BASOPHILS NFR BLD: 1 % (ref 0–1)
DIFFERENTIAL METHOD BLD: NORMAL
EOSINOPHIL # BLD: 0.3 K/UL (ref 0–0.4)
EOSINOPHIL NFR BLD: 5 % (ref 0–7)
ERYTHROCYTE [DISTWIDTH] IN BLOOD BY AUTOMATED COUNT: 11.9 % (ref 11.5–14.5)
HCT VFR BLD AUTO: 45.8 % (ref 36.6–50.3)
HGB BLD-MCNC: 15.6 G/DL (ref 12.1–17)
IMM GRANULOCYTES # BLD AUTO: 0 K/UL (ref 0–0.04)
IMM GRANULOCYTES NFR BLD AUTO: 0 % (ref 0–0.5)
LYMPHOCYTES # BLD: 1.4 K/UL (ref 0.8–3.5)
LYMPHOCYTES NFR BLD: 21 % (ref 12–49)
MCH RBC QN AUTO: 31.6 PG (ref 26–34)
MCHC RBC AUTO-ENTMCNC: 34.1 G/DL (ref 30–36.5)
MCV RBC AUTO: 92.9 FL (ref 80–99)
MONOCYTES # BLD: 0.6 K/UL (ref 0–1)
MONOCYTES NFR BLD: 9 % (ref 5–13)
NEUTS SEG # BLD: 4.3 K/UL (ref 1.8–8)
NEUTS SEG NFR BLD: 64 % (ref 32–75)
NRBC # BLD: 0 K/UL (ref 0–0.01)
NRBC BLD-RTO: 0 PER 100 WBC
PLATELET # BLD AUTO: 327 K/UL (ref 150–400)
PMV BLD AUTO: 10 FL (ref 8.9–12.9)
RBC # BLD AUTO: 4.93 M/UL (ref 4.1–5.7)
WBC # BLD AUTO: 6.7 K/UL (ref 4.1–11.1)

## 2024-12-03 RX ORDER — ALUMINUM ZIRCONIUM OCTACHLOROHYDREX GLY 16 G/100G
1 GEL TOPICAL DAILY
Qty: 30 CAPSULE | Refills: 0 | Status: SHIPPED | OUTPATIENT
Start: 2024-12-03

## 2024-12-03 ASSESSMENT — PATIENT HEALTH QUESTIONNAIRE - PHQ9
SUM OF ALL RESPONSES TO PHQ QUESTIONS 1-9: 0
2. FEELING DOWN, DEPRESSED OR HOPELESS: NOT AT ALL
SUM OF ALL RESPONSES TO PHQ QUESTIONS 1-9: 0
SUM OF ALL RESPONSES TO PHQ9 QUESTIONS 1 & 2: 0
SUM OF ALL RESPONSES TO PHQ QUESTIONS 1-9: 0
SUM OF ALL RESPONSES TO PHQ QUESTIONS 1-9: 0
1. LITTLE INTEREST OR PLEASURE IN DOING THINGS: NOT AT ALL

## 2024-12-03 NOTE — PROGRESS NOTES
Trenton Maurice is a 69 y.o. male     Chief Complaint   Patient presents with    transition of care     Discharged 11- from [place] [facility] for Small bowel obstruction       \"Have you been to the ER, urgent care clinic since your last visit?  Hospitalized since your last visit?\"    Admitted 11- for Small Bowel Obstruction at [facility]    “Have you seen or consulted any other health care providers outside of [facility] since your last visit?”    NO                    
97.8 °F (36.6 °C)   Ht 1.702 m (5' 7\")   Wt 64.4 kg (142 lb)   SpO2 97%   BMI 22.24 kg/m²   CONSTITUTIONAL: well , well nourished, appears age appropriate  HEAD: normocephalic, atraumatic  EYES: sclera anicteric, PERRL, EOMI  ENMT:moist mucous membranes, pharynx clear          Nares: w/o erythema or edema  NECK: supple. Thyroid normal, No JVD or bruits  RESPIRATORY: Chest: clear to ascultation and percussion   CARDIOVASCULAR: Heart: regular rate and rhythm no murmurs, rubs or gallops, PMI not displaced, no thrill  GASTROINTESTINAL: Abdomen: non distended, soft, tender midepigastrium/just right above the umbilicus as well as in the right lower quadrant which could possibly that be related to his recent hernia repair, bowel sounds normal  HEMATOLOGIC: no petechiae or purpura  LYMPHATIC: no lymphadenopathy  MUSCULOSKELETAL: Extremities: no edema or active synovitis, pulse 1+   BACK; no point or CVAT  INTEGUMENT: No unusual rashes or suspicious skin lesions noted. Nails appear normal.  NEUROLOGIC: non-focal exam   MENTAL STATUS: alert and oriented, appropriate affect   PSYCHIATRIC: normal affect    ASSESSMENT:   1. Small bowel obstruction (HCC)    2. Enteritis    3. Right inguinal hernia    4. Needs flu shot    5. Hospital discharge follow-up      Impression  1.  Small bowel obstruction he seems to be having liquid bowel movements now.  I think since he still having quite a bit of tenderness in the mid abdominal area that we need to repeat his CT scan and see if that has cleared.  He has put in a call to his gastroenterologist Dr. Hillman but has not yet received a return call as to when there is a follow-up appointment with them will be scheduled.  2.  Enteritis as noted involving the distal small bowel and transverse colon CT scan hopefully will show that things are returned to normal there plus he also has noted had the ascites which I think is important to see if that is cleared  3.  Right inguinal hernia status

## 2024-12-04 ENCOUNTER — HOSPITAL ENCOUNTER (OUTPATIENT)
Facility: HOSPITAL | Age: 69
Discharge: HOME | End: 2024-12-07
Attending: INTERNAL MEDICINE
Payer: MEDICARE

## 2024-12-04 DIAGNOSIS — K56.609 SMALL BOWEL OBSTRUCTION (HCC): ICD-10-CM

## 2024-12-04 DIAGNOSIS — K52.9 ENTERITIS: ICD-10-CM

## 2024-12-04 PROCEDURE — 2500000003 HC RX 250 WO HCPCS: Performed by: INTERNAL MEDICINE

## 2024-12-04 PROCEDURE — 74176 CT ABD & PELVIS W/O CONTRAST: CPT

## 2024-12-04 RX ADMIN — BARIUM SULFATE 900 ML: 20 SUSPENSION ORAL at 10:08

## 2024-12-23 NOTE — DISCHARGE INSTRUCTIONS
Discharge Instructions       PATIENT ID: Trenton Maurice  MRN: [id number]   YOB: 1955    DATE OF ADMISSION: 11/25/2024   DATE OF DISCHARGE: 11/26/2024    PRIMARY CARE PROVIDER: Leighton Dillon     ATTENDING PHYSICIAN: Daniel Stapleton MD   DISCHARGING PROVIDER: NIKKIE Smith CNP    To contact this individual call [phone] and ask the  to page.   If unavailable ask to be transferred the Adult Hospitalist Department.    DISCHARGE DIAGNOSES gastroenteritis with colitis    CONSULTATIONS: [unfilled]    PROCEDURES/SURGERIES: * No surgery found *    PENDING TEST RESULTS:   At the time of discharge the following test results are still pending: none    FOLLOW UP APPOINTMENTS:   [email or web address]     ADDITIONAL CARE RECOMMENDATIONS: follow up outpatient with GI    DIET: regular diet  Oral Nutritional Supplements: Ensure High Proonce a day    ACTIVITY: activity as tolerated    WOUND CARE: none    EQUIPMENT needed: none      DISCHARGE MEDICATIONS:   See Medication Reconciliation Form    It is important that you take the medication exactly as they are prescribed.   Keep your medication in the bottles provided by the pharmacist and keep a list of the medication names, dosages, and times to be taken in your wallet.   Do not take other medications without consulting your doctor.       NOTIFY YOUR PHYSICIAN FOR ANY OF THE FOLLOWING:   Fever over 101 degrees for 24 hours.   Chest pain, shortness of breath, fever, chills, nausea, vomiting, diarrhea, change in mentation, falling, weakness, bleeding. Severe pain or pain not relieved by medications.  Or, any other signs or symptoms that you may have questions about.      DISPOSITION:   x Home With:   OT  PT  HH  RN       SNF/Inpatient Rehab/LTAC    Independent/assisted living    Hospice    Other:     CDMP Checked:   Yes y     PROBLEM LIST Updated:  Yes y       Signed:   NIKKIE Smith CNP  11/26/2024  2:20 PM        [facility] Int[facility]  INPATIENT PROGRESS NOTE    Patient Name: Dayron Lubin  Date of Admission: 12/20/2024  Today's Date: 12/23/24  Length of Stay: 1  Primary Care Physician: Herberth Nguyen Jr., MD    Subjective   ICU Summary:  71-year-old male with A-fib anticoagulated with Eliquis, alcohol abuse, and chronic falls, transferred to ICU on 12/22/2024 from medical floor wound with worsening alcohol withdrawal.  Patient was tremulous confused and restless.  He was already on Librium and Ativan per [facility].  He was given phenobarbital with taper.  His symptoms greatly improved.    Interval update:  12/23  Patient alert and oriented, no distress.  Nontremulous.  Vital signs stable.  Expresses wishes to get better and pursue alcohol rehab.    Review of Systems   All pertinent negatives and positives are as above. All other systems have been reviewed and are negative unless otherwise stated.     Objective    Temp:  [97.2 °F (36.2 °C)-98.3 °F (36.8 °C)] 98.2 °F (36.8 °C)  Heart Rate:  [59-93] 61  Resp:  [14-21] 14  BP: ()/() 96/70  Physical Exam  Vitals and nursing note reviewed.   Constitutional:       General: He is not in acute distress.  Eyes:      Pupils: Pupils are equal, round, and reactive to light.   Cardiovascular:      Rate and Rhythm: Normal rate and regular rhythm.      Pulses: Normal pulses.      Heart sounds: Normal heart sounds.   Pulmonary:      Effort: Pulmonary effort is normal.      Breath sounds: Normal breath sounds.   Abdominal:      General: There is no distension.      Palpations: Abdomen is soft.      Tenderness: There is no abdominal tenderness.   Musculoskeletal:         General: Normal range of motion.      Comments: Left gluteal bruising, hematoma   Skin:     General: Skin is warm and dry.   Neurological:      General: No focal deficit present.      Mental Status: He is alert and oriented to person, place, and time.       Results  Review:  CBC          12/20/2024    11:01 12/21/2024    01:54 12/21/2024    11:07 12/23/2024    10:05   CBC   WBC 7.23  5.02   3.57    RBC 2.52  2.07   2.30    Hemoglobin 8.0  6.7  8.1  7.5    Hematocrit 24.2  20.2  24.1  23.2    MCV 96.0  97.6   100.9    MCH 31.7  32.4   32.6    MCHC 33.1  33.2   32.3    RDW 14.9  15.1   15.3    Platelets 173  142   93      CMP          12/20/2024    11:01 12/21/2024    01:54 12/23/2024    10:05   CMP   Glucose 117  101  87    BUN 20  28  20    Creatinine 1.66  1.52  0.95    EGFR 44.1  49.0  86.1    Sodium 135  134  135    Potassium 4.9  4.4  3.5    Chloride 93  95  100    Calcium 8.7  8.1  8.4    Total Protein 6.9   6.2    Albumin 4.0   3.6    Globulin 2.9   2.6    Total Bilirubin 0.7   0.6    Alkaline Phosphatase 91   70    AST (SGOT) 68   32    ALT (SGPT) 57   25    Albumin/Globulin Ratio 1.4   1.4    BUN/Creatinine Ratio 12.0  18.4  21.1    Anion Gap 24.0  13.0  11.0          Result Review:  I have personally reviewed the results from the time of this admission to 12/23/2024 09:53 CST and agree with these findings:  [x]  Laboratory list / accordion  [x]  Microbiology  [x]  Radiology  [x]  EKG/Telemetry   []  Cardiology/Vascular   []  Pathology  []  Old records  []  Other:    I have reviewed the patient's current medications.     Assessment/Plan   Assessment  Active Hospital Problems    Diagnosis     **Stage 1 acute kidney injury     Large left gluteal compartment intramuscular hematomas     Chronic anticoagulation     MIKEY (acute kidney injury)     Alcohol intoxication     Multiple falls     Transaminitis     Alcohol withdrawal     Paroxysmal atrial fibrillation     Gait instability     Alcohol abuse     HOCM (hypertrophic obstructive cardiomyopathy)     Essential hypertension    71-year-old male with alcohol abuse, admitted to ICU with worsening withdrawal symptoms.  Patient started on phenobarbital with taper in conjunction with his Librium.  His symptoms have since ceased.   He is alert and oriented today.  He is not tremulous.    Alcohol abuse, alcohol withdrawal  -Continue phenobarbital with taper  -Continue Librium  -Ativan per [facility] protocol  -Patient expressed wishes to get better and find a alcohol rehab program upon discharge.  He is concerned about the financial burden and trying stepwise before that did not help.  He is also concerned that anxiety is his main driving factor to drink alcohol.  He was started on buspirone last admission.  -social work/case management is on case to assist with placement at discharge.     MIKEY  -Resolved     Left gluteal compartment muscular hematoma  -Currently off Eliquis for this and given multiple falls  -Monitor for any signs of increase or vascular compromise  -Local ice for analgesia     Transaminitis  -Chronically elevated with alcoholic hepatitis, follow daily CMP     Paroxysmal atrial fibrillation  -Currently normal sinus rhythm, continue amiodarone and metoprolol  -Eliquis currently on hold given anemia and hematoma  -Patient with chronic falls, will need evaluation for appropriateness of long-term anticoagulation with his cardiologist and/or PCP    Acute blood loss anemia  -Secondary to left hip injury, hematoma and anticoagulation with Eliquis  -Follow daily CBC and transfuse as indicated    Disposition: Stable for transfer to medical floor    50 minutes minimum spent on patient, MDM high     This time included obtaining a history; examining the patient; pulse oximetry; ordering and review of studies; arranging urgent treatment with development of a management plan; evaluation of patient's response to treatment; frequent reassessment; and, discussions with other providers.     Please see rest of the note for further information on patient assessment, MDM, and treatment.     Part of this note may be an electronic transcription/translation of spoken language to printed text using the Dragon dictation system      Electronically signed by  Maldonado Crain, APRN on 12/23/2024 at 12:27 CST    Addendum: Patient received room 387.  Report given to Dr. Carrillo with [facility] medicine.  He accepted the patient to his service under the care of of Dr. England.

## 2025-01-16 ENCOUNTER — ANESTHESIA EVENT (OUTPATIENT)
Facility: HOSPITAL | Age: 70
End: 2025-01-16
Payer: MEDICARE

## 2025-01-16 NOTE — ANESTHESIA PRE PROCEDURE
Department of Anesthesiology  Preprocedure Note       Name:  Trenton Maurice   Age:  70 y.o.  :  1955                                          MRN:  [id number]         Date:  2025      Surgeon: Surgeon(s):  Soren Amador DO    Procedure: Procedure(s):  COLONOSCOPY    Medications prior to admission:   Prior to Admission medications    Medication Sig Start Date End Date Taking? Authorizing Provider   Probiotic Product (ALIGN) 4 MG CAPS Take 1 tablet by mouth daily 12/3/24   Leighton Dillon MD   rosuvastatin (CRESTOR) 20 MG tablet Take 1 tablet by mouth daily 24   ProviderJennifer MD   Evolocumab (REPATHA SURECLICK) 140 MG/ML SOAJ Inject 140 mg into the skin every 14 days PLEASE SEE ATTACHED FOR DETAILED DIRECTIONS 10/24/24   Leighton Dillon MD   Multiple Vitamin (MULTIVITAMIN ADULT PO) Take 1 tablet by mouth daily    Jennifer Nicholson MD   tadalafil (CIALIS) 5 MG tablet Take 1 tablet by mouth daily    Jennifer Nicholson MD   aspirin 81 MG chewable tablet Take 1 tablet by mouth daily 18   Automatic Reconciliation, Ar   vitamin D 25 MCG (1000 UT) CAPS Take 1 capsule by mouth daily    Automatic Reconciliation, Ar       Current medications:    No current facility-administered medications for this encounter.     Current Outpatient Medications   Medication Sig Dispense Refill    Probiotic Product (ALIGN) 4 MG CAPS Take 1 tablet by mouth daily 30 capsule 0    rosuvastatin (CRESTOR) 20 MG tablet Take 1 tablet by mouth daily      Evolocumab (REPATHA SURECLICK) 140 MG/ML SOAJ Inject 140 mg into the skin every 14 days PLEASE SEE ATTACHED FOR DETAILED DIRECTIONS 6 Adjustable Dose Pre-filled Pen Syringe 4    Multiple Vitamin (MULTIVITAMIN ADULT PO) Take 1 tablet by mouth daily      tadalafil (CIALIS) 5 MG tablet Take 1 tablet by mouth daily      aspirin 81 MG chewable tablet Take 1 tablet by mouth daily      vitamin D 25 MCG (1000 UT) CAPS Take 1 capsule by mouth daily

## 2025-01-17 ENCOUNTER — HOSPITAL ENCOUNTER (OUTPATIENT)
Facility: HOSPITAL | Age: 70
Setting detail: OUTPATIENT SURGERY
Discharge: HOME | End: 2025-01-17
Attending: STUDENT IN AN ORGANIZED HEALTH CARE EDUCATION/TRAINING PROGRAM | Admitting: STUDENT IN AN ORGANIZED HEALTH CARE EDUCATION/TRAINING PROGRAM
Payer: MEDICARE

## 2025-01-17 ENCOUNTER — ANESTHESIA (OUTPATIENT)
Facility: HOSPITAL | Age: 70
End: 2025-01-17
Payer: MEDICARE

## 2025-01-17 VITALS
HEART RATE: 62 BPM | DIASTOLIC BLOOD PRESSURE: 77 MMHG | BODY MASS INDEX: 21.97 KG/M2 | RESPIRATION RATE: 17 BRPM | TEMPERATURE: 97.9 F | HEIGHT: 67 IN | SYSTOLIC BLOOD PRESSURE: 144 MMHG | OXYGEN SATURATION: 99 % | WEIGHT: 140 LBS

## 2025-01-17 PROCEDURE — 3600007502: Performed by: STUDENT IN AN ORGANIZED HEALTH CARE EDUCATION/TRAINING PROGRAM

## 2025-01-17 PROCEDURE — 3600007512: Performed by: STUDENT IN AN ORGANIZED HEALTH CARE EDUCATION/TRAINING PROGRAM

## 2025-01-17 PROCEDURE — 2580000003 HC RX 258

## 2025-01-17 PROCEDURE — 3700000000 HC ANESTHESIA ATTENDED CARE: Performed by: STUDENT IN AN ORGANIZED HEALTH CARE EDUCATION/TRAINING PROGRAM

## 2025-01-17 PROCEDURE — 7100000011 HC PHASE II RECOVERY - ADDTL 15 MIN: Performed by: STUDENT IN AN ORGANIZED HEALTH CARE EDUCATION/TRAINING PROGRAM

## 2025-01-17 PROCEDURE — 2720000010 HC SURG SUPPLY STERILE: Performed by: STUDENT IN AN ORGANIZED HEALTH CARE EDUCATION/TRAINING PROGRAM

## 2025-01-17 PROCEDURE — 7100000010 HC PHASE II RECOVERY - FIRST 15 MIN: Performed by: STUDENT IN AN ORGANIZED HEALTH CARE EDUCATION/TRAINING PROGRAM

## 2025-01-17 PROCEDURE — 2709999900 HC NON-CHARGEABLE SUPPLY: Performed by: STUDENT IN AN ORGANIZED HEALTH CARE EDUCATION/TRAINING PROGRAM

## 2025-01-17 PROCEDURE — 88305 TISSUE EXAM BY PATHOLOGIST: CPT

## 2025-01-17 PROCEDURE — 6360000002 HC RX W HCPCS

## 2025-01-17 PROCEDURE — 3700000001 HC ADD 15 MINUTES (ANESTHESIA): Performed by: STUDENT IN AN ORGANIZED HEALTH CARE EDUCATION/TRAINING PROGRAM

## 2025-01-17 RX ORDER — SODIUM CHLORIDE 9 MG/ML
INJECTION, SOLUTION INTRAVENOUS CONTINUOUS
Status: DISCONTINUED | OUTPATIENT
Start: 2025-01-17 | End: 2025-01-17 | Stop reason: HOSPADM

## 2025-01-17 RX ORDER — SODIUM CHLORIDE 0.9 % (FLUSH) 0.9 %
5-40 SYRINGE (ML) INJECTION PRN
Status: DISCONTINUED | OUTPATIENT
Start: 2025-01-17 | End: 2025-01-17 | Stop reason: HOSPADM

## 2025-01-17 RX ORDER — SODIUM CHLORIDE 9 MG/ML
INJECTION, SOLUTION INTRAVENOUS
Status: DISCONTINUED | OUTPATIENT
Start: 2025-01-17 | End: 2025-01-17 | Stop reason: SDUPTHER

## 2025-01-17 RX ORDER — SODIUM CHLORIDE 0.9 % (FLUSH) 0.9 %
5-40 SYRINGE (ML) INJECTION EVERY 12 HOURS SCHEDULED
Status: DISCONTINUED | OUTPATIENT
Start: 2025-01-17 | End: 2025-01-17 | Stop reason: HOSPADM

## 2025-01-17 RX ORDER — SODIUM CHLORIDE 9 MG/ML
INJECTION, SOLUTION INTRAVENOUS PRN
Status: DISCONTINUED | OUTPATIENT
Start: 2025-01-17 | End: 2025-01-17 | Stop reason: HOSPADM

## 2025-01-17 RX ORDER — LIDOCAINE HYDROCHLORIDE 20 MG/ML
INJECTION, SOLUTION EPIDURAL; INFILTRATION; INTRACAUDAL; PERINEURAL
Status: DISCONTINUED | OUTPATIENT
Start: 2025-01-17 | End: 2025-01-17 | Stop reason: SDUPTHER

## 2025-01-17 RX ADMIN — PROPOFOL 30 MG: 10 INJECTION, EMULSION INTRAVENOUS at 09:39

## 2025-01-17 RX ADMIN — SODIUM CHLORIDE: 9 INJECTION, SOLUTION INTRAVENOUS at 09:17

## 2025-01-17 RX ADMIN — PROPOFOL 50 MG: 10 INJECTION, EMULSION INTRAVENOUS at 09:33

## 2025-01-17 RX ADMIN — PROPOFOL 20 MG: 10 INJECTION, EMULSION INTRAVENOUS at 09:23

## 2025-01-17 RX ADMIN — PROPOFOL 20 MG: 10 INJECTION, EMULSION INTRAVENOUS at 09:29

## 2025-01-17 RX ADMIN — PROPOFOL 40 MG: 10 INJECTION, EMULSION INTRAVENOUS at 09:26

## 2025-01-17 RX ADMIN — LIDOCAINE HYDROCHLORIDE 50 MG: 20 INJECTION, SOLUTION EPIDURAL; INFILTRATION; INTRACAUDAL; PERINEURAL at 09:22

## 2025-01-17 RX ADMIN — PROPOFOL 50 MG: 10 INJECTION, EMULSION INTRAVENOUS at 09:22

## 2025-01-17 RX ADMIN — PROPOFOL 20 MG: 10 INJECTION, EMULSION INTRAVENOUS at 09:24

## 2025-01-17 ASSESSMENT — PAIN - FUNCTIONAL ASSESSMENT: PAIN_FUNCTIONAL_ASSESSMENT: NONE - DENIES PAIN

## 2025-01-17 NOTE — OP NOTE
NAME:  Trenton Maurice   :   1955   MRN:   [id number]     Date/Time:  2025 9:51 AM    Colonoscopy Operative Report    Procedure Type:   Colonoscopy with polypectomy (cold snare)     Indications:    change in bowel habits, abnormal CT GI tract  Pre-operative Diagnosis: see indication above  Post-operative Diagnosis:  See findings below  :  RUBINA Amador D.O.  Referring Provider: -Leighton Dillon MD    Exam:  Airway: clear, no airway problems anticipated  Heart: RRR, without gallops or rubs  Lungs: clear bilaterally without wheezes, crackles, or rhonchi  Abdomen: soft, nontender, nondistended, bowel sounds present  Mental Status: awake, alert and oriented to person, place and time    Sedation:  MAC anesthesia Propofol  Procedure Details:  After informed consent was obtained with all risks and benefits of procedure explained and preoperative exam completed, the patient was taken to the endoscopy suite and placed in the left lateral decubitus position.  Upon sequential sedation as per above, a digital rectal exam was performed which was normal.  The Olympus videocolonoscope  was inserted in the rectum and carefully advanced to the terminal ileum w/ identification of the ileocecal valve and appendiceal orifice.   The quality of preparation was good.  The colonoscope was slowly withdrawn with careful evaluation between folds. Retroflexion in the rectum was completed demonstrating internal hemorrhoids.     Findings:   Anus/Mary-anal exam:  Normal  Rectum:   Normal  Sigmoid:   Normal  Descending Colon:   Normal  Transverse Colon: 4 mm polyp removed, complete resection and retrieval en bloc via cold snare polypectomy.     Ascending Colon:   Normal  Cecum:   Normal  Terminal Ileum:   Normal    Specimen Removed:    ID Type Source Tests Collected by Time Destination   1 : Transverse colon polyp Tissue Colon-Transverse SURGICAL PATHOLOGY Soren Amador DO 2025 0939

## 2025-01-17 NOTE — DISCHARGE SUMMARY
FADUMO MACI[facility]  RUBINA Amador D.O.  [phone]            COLONOSCOPY DISCHARGE INSTRUCTIONS    Trenton Maurice  [id number]  1955    DISCOMFORT:  Redness at IV site- apply warm compress to area; if redness or soreness persist- contact your physician  There may be a slight amount of blood passed from the rectum  Gaseous discomfort- walking, belching will help relieve any discomfort  You may not operate a vehicle for 12 hours  You may not engage in an occupation involving machinery or appliances for the  rest of today  You may not drink alcoholic beverages for at least 12 hours  Avoid making any critical decisions for at least 24 hours    DIET:   You may resume your normal diet, but some patients find that heavy or large meals may lead to indigestion or vomiting. I suggest a light meal as first food intake.    ACTIVITY:  You may resume your normal daily activities.  It is recommended that you spend the remainder of the day resting - avoid any strenuous activity.    CALL M.D. IF ANY SIGN OF:   Increasing pain, nausea, vomiting  Abdominal distension (swelling)  Significant bleeding (oral or rectal)  Fever   Pain in chest area  Shortness of breath    Additional Instructions:   Call Dr. Amador if any questions or problems at [phone]   You should receive the biopsy results by phone or mail within 3 weeks, if not, call  my office for the results      Colonoscopy showed one small benign polyp but no evidence of active colitis, so I suspect this was a self-limited infectious colitis which has now resolved.    Should have a repeat colonoscopy in 7 years.    Resume regular medications    Follow-up at your convenience.      RUBINA Amador D.O.  [facility]

## 2025-01-17 NOTE — PROGRESS NOTES
ARRIVAL INFORMATION:  Verified patient name and date of birth, scheduled procedure, and informed consent.     : Kathy (spouse) contact number: [phone]  Physician and staff can share information with the .     Receive texts: yes    Belongings with patient include:  Clothing,Glasses, cell phone, wallet    GI FOCUSED ASSESSMENT:  Neuro: Awake, alert, oriented x4  Respiratory: even and unlabored   GI: soft and non-distended  EKG Rhythm: normal sinus rhythm    Education:Reviewed general discharge instructions and  information.

## 2025-01-17 NOTE — DISCHARGE INSTRUCTIONS
FADUMO MACI[facility]  RBUINA Amador D.O.  [phone]            COLONOSCOPY DISCHARGE INSTRUCTIONS    Trenton Maurice  [id number]  1955    DISCOMFORT:  Redness at IV site- apply warm compress to area; if redness or soreness persist- contact your physician  There may be a slight amount of blood passed from the rectum  Gaseous discomfort- walking, belching will help relieve any discomfort  You may not operate a vehicle for 12 hours  You may not engage in an occupation involving machinery or appliances for the  rest of today  You may not drink alcoholic beverages for at least 12 hours  Avoid making any critical decisions for at least 24 hours    DIET:   You may resume your normal diet, but some patients find that heavy or large meals may lead to indigestion or vomiting. I suggest a light meal as first food intake.    ACTIVITY:  You may resume your normal daily activities.  It is recommended that you spend the remainder of the day resting - avoid any strenuous activity.    CALL M.D. IF ANY SIGN OF:   Increasing pain, nausea, vomiting  Abdominal distension (swelling)  Significant bleeding (oral or rectal)  Fever   Pain in chest area  Shortness of breath    Additional Instructions:   Call Dr. Amador if any questions or problems at [phone]   You should receive the biopsy results by phone or mail within 3 weeks, if not, call  my office for the results      Colonoscopy showed one small benign polyp but no evidence of active colitis, so I suspect this was a self-limited infectious colitis which has now resolved.    Should have a repeat colonoscopy in 7 years.    Resume regular medications    Follow-up at your convenience.      RUBINA Amador D.O.  [facility]

## 2025-01-17 NOTE — H&P
FADUMO MACIAS [facility]  RUBINA Amador D.O.  [phone]          Pre-procedure History and Physical       NAME:  Trenton Maurice   :   1955   MRN:   [id number]     CHIEF COMPLAINT/HPI:     70 y.o. male here for colonoscopy for change in bowel habits & abnormal CT of the GI tract.      PMH:  Past Medical History:   Diagnosis Date    CAD (coronary artery disease)     VCS - Dr. Nye; 6 cardiac stents    Hyperlipidemia     Prostate cancer (HCC)     being monitored    Small bowel obstruction (HCC) 2024    hospitalized at [facility]- after hernia repair surgery       PSH:  Past Surgical History:   Procedure Laterality Date    ANGIOPLASTY      6 cardiac stents    APPENDECTOMY      in 8th grade    CARDIAC CATHETERIZATION      HERNIA REPAIR Left     HERNIA REPAIR N/A 2024    ROBOTIC ASSISTED LAPAROSCOPIC RIGHT INGUINAL HERNIA REPAIR WITH MESH AND INCISIONAL HERNIA REPAIR WITH MESH performed by Nicko Guevara MD [facility]    KNEE CARTILAGE SURGERY Right     LUMBAR LAMINECTOMY      TONSILLECTOMY      WISDOM TOOTH EXTRACTION         Allergies:  No Known Allergies    Home Medications:  Prior to Admission Medications   Prescriptions Last Dose Informant Patient Reported? Taking?   Evolocumab (REPATHA SURECLICK) 140 MG/ML SOAJ Past Month  No Yes   Sig: Inject 140 mg into the skin every 14 days PLEASE SEE ATTACHED FOR DETAILED DIRECTIONS   Multiple Vitamin (MULTIVITAMIN ADULT PO) Past Week  Yes Yes   Sig: Take 1 tablet by mouth daily   aspirin 81 MG chewable tablet 2025  Yes No   Sig: Take 1 tablet by mouth daily   rosuvastatin (CRESTOR) 20 MG tablet 2025  Yes Yes   Sig: Take 1 tablet by mouth daily   tadalafil (CIALIS) 5 MG tablet 2025  Yes Yes   Sig: Take 1 tablet by mouth daily   vitamin D 25 MCG (1000 UT) CAPS 2025  Yes Yes   Sig: Take 1 capsule by mouth daily      Facility-Administered Medications: None       Hospital Medications:  No  current facility-administered medications for this encounter.       Family History:  Family History   Problem Relation Age of Onset    Lung Disease Mother     Heart Disease Father     Colon Cancer Father     Prostate Cancer Father     Heart Disease Brother        Social History:  Social History     Tobacco Use    Smoking status: Never     Passive exposure: Never    Smokeless tobacco: Never   Substance Use Topics    Alcohol use: Yes     Comment: occasionally         PHYSICAL EXAM PRIOR TO SEDATION:  General: Alert, in no acute distress    HEAD:  NCAT  CV:   RRR, no R/M/G  ENT:  No scleral icterus,   Lungs:            Symmetric expansion, no accessory muscle use. No increased work of breathing. CTA B/L  ABD:  Soft, non-tender, non-distended  Heart:  RRR    SKIN:   Dry, intact  PSYCH: Appropriate affect, appropriate judgement/insight      Assessment:   Stable for sedation administration.    Plan:     Endoscopic procedure with sedation  Colonoscopy +/- biopsy if needed (61336). Colonoscopy +/- polypectomy if needed (56023).     Signed By: RUBINA Amador D.O.  [facility]      1/17/2025  9:14 AM

## 2025-01-17 NOTE — ANESTHESIA POSTPROCEDURE EVALUATION
Department of Anesthesiology  Postprocedure Note    Patient: Trenton Maurice  MRN: [id number]  YOB: 1955  Date of evaluation: 1/17/2025    Procedure Summary       Date: 01/17/25 Room / Location: [facility] ENDO 01 / [facility] ENDOSCOPY    Anesthesia Start: 0917 Anesthesia Stop: 0950    Procedure: COLONOSCOPY (Lower GI Region) Diagnosis:       Change in bowel habits      (Change in bowel habits [R19.4])    Surgeons: Soren Amador DO Responsible Provider: Roman Tavarez MD    Anesthesia Type: MAC ASA Status: 2            Anesthesia Type: MAC    Kalin Phase I: Kalin Score: 10    Kalin Phase II: Kalin Score: 10    Anesthesia Post Evaluation    Patient location during evaluation: bedside  Patient participation: complete - patient participated  Level of consciousness: awake  Pain score: 0  Airway patency: patent  Nausea & Vomiting: no nausea and no vomiting  Cardiovascular status: hemodynamically stable  Respiratory status: acceptable  Hydration status: euvolemic  Pain management: adequate    No notable events documented.

## 2025-02-03 ENCOUNTER — HOSPITAL ENCOUNTER (OUTPATIENT)
Facility: HOSPITAL | Age: 70
Discharge: HOME | End: 2025-02-06
Payer: MEDICARE

## 2025-02-03 ENCOUNTER — OFFICE VISIT (OUTPATIENT)
Facility: CLINIC | Age: 70
End: 2025-02-03
Payer: MEDICARE

## 2025-02-03 VITALS
HEIGHT: 67 IN | HEART RATE: 74 BPM | BODY MASS INDEX: 21.88 KG/M2 | WEIGHT: 139.4 LBS | DIASTOLIC BLOOD PRESSURE: 70 MMHG | SYSTOLIC BLOOD PRESSURE: 130 MMHG | TEMPERATURE: 97.8 F | OXYGEN SATURATION: 97 % | RESPIRATION RATE: 17 BRPM

## 2025-02-03 DIAGNOSIS — J18.9 PNEUMONIA OF RIGHT LOWER LOBE DUE TO INFECTIOUS ORGANISM: Primary | ICD-10-CM

## 2025-02-03 DIAGNOSIS — J18.9 PNEUMONIA OF RIGHT LOWER LOBE DUE TO INFECTIOUS ORGANISM: ICD-10-CM

## 2025-02-03 PROCEDURE — 1159F MED LIST DOCD IN RCRD: CPT | Performed by: INTERNAL MEDICINE

## 2025-02-03 PROCEDURE — 1036F TOBACCO NON-USER: CPT | Performed by: INTERNAL MEDICINE

## 2025-02-03 PROCEDURE — 1160F RVW MEDS BY RX/DR IN RCRD: CPT | Performed by: INTERNAL MEDICINE

## 2025-02-03 PROCEDURE — 1126F AMNT PAIN NOTED NONE PRSNT: CPT | Performed by: INTERNAL MEDICINE

## 2025-02-03 PROCEDURE — G8420 CALC BMI NORM PARAMETERS: HCPCS | Performed by: INTERNAL MEDICINE

## 2025-02-03 PROCEDURE — 3017F COLORECTAL CA SCREEN DOC REV: CPT | Performed by: INTERNAL MEDICINE

## 2025-02-03 PROCEDURE — 1123F ACP DISCUSS/DSCN MKR DOCD: CPT | Performed by: INTERNAL MEDICINE

## 2025-02-03 PROCEDURE — G8427 DOCREV CUR MEDS BY ELIG CLIN: HCPCS | Performed by: INTERNAL MEDICINE

## 2025-02-03 PROCEDURE — 71046 X-RAY EXAM CHEST 2 VIEWS: CPT

## 2025-02-03 PROCEDURE — 99213 OFFICE O/P EST LOW 20 MIN: CPT | Performed by: INTERNAL MEDICINE

## 2025-02-03 RX ORDER — PREDNISONE 5 MG/1
TABLET ORAL
Qty: 1 EACH | Refills: 0 | Status: SHIPPED | OUTPATIENT
Start: 2025-02-03

## 2025-02-03 RX ORDER — LEVOFLOXACIN 500 MG/1
500 TABLET, FILM COATED ORAL DAILY
Qty: 10 TABLET | Refills: 0 | Status: SHIPPED | OUTPATIENT
Start: 2025-02-03 | End: 2025-02-13

## 2025-02-03 ASSESSMENT — PATIENT HEALTH QUESTIONNAIRE - PHQ9
SUM OF ALL RESPONSES TO PHQ QUESTIONS 1-9: 0
SUM OF ALL RESPONSES TO PHQ QUESTIONS 1-9: 0
1. LITTLE INTEREST OR PLEASURE IN DOING THINGS: NOT AT ALL
SUM OF ALL RESPONSES TO PHQ QUESTIONS 1-9: 0
SUM OF ALL RESPONSES TO PHQ QUESTIONS 1-9: 0
SUM OF ALL RESPONSES TO PHQ9 QUESTIONS 1 & 2: 0
2. FEELING DOWN, DEPRESSED OR HOPELESS: NOT AT ALL

## 2025-02-03 NOTE — PROGRESS NOTES
Trenton Maurice is a 70 y.o. male     Chief Complaint   Patient presents with    Pneumonia     Pneumonia f/u       /70 (Site: Left Upper Arm, Position: Sitting, Cuff Size: Large Adult)   Pulse 74   Temp 97.8 °F (36.6 °C) (Oral)   Resp 17   Ht 1.702 m (5' 7\")   Wt 63.2 kg (139 lb 6.4 oz)   SpO2 97%   BMI 21.83 kg/m²     Health Maintenance Due   Topic Date Due    DTaP/Tdap/Td vaccine (1 - Tdap) Never done    COVID-19 Vaccine (2 - 2023-24 season) 09/01/2024         \"Have you been to the ER, urgent care clinic since your last visit?  Hospitalized since your last visit?\"    YES - When: approximately 10 days ago.  Where and Why: [facility].    “Have you seen or consulted any other health care providers outside of [facility] since your last visit?”    NO

## 2025-02-03 NOTE — PROGRESS NOTES
Subjective:   Trenton Maurice is a 70 y.o. male      Chief Complaint   Patient presents with    Pneumonia     Pneumonia f/u        History of present illness: He presents complaint of cough and congestion that has been persistent since he was diagnosed with pneumonia while in [place].  That diagnosis of may have a chest x-ray done on the 24th.  I did review that x-ray did show evidence of a pneumonia right lower lobe medial aspect.  He was treated with Augmentin and he does not feel like is really any better.  He has noted no fevers or chills now.  He did have a white count of 15,000 when he was initially diagnosed.    Patient Active Problem List   Diagnosis    Fatigue    Glucose intolerance    COVID-19    Vitamin D deficiency    ASCVD (arteriosclerotic cardiovascular disease)    Intractable low back pain    Prostate CA (HCC)    Benign prostatic hyperplasia with nocturia    Lumbar disc herniation    Mixed hyperlipidemia    Alcohol screening    Right inguinal hernia    Ventral hernia without obstruction or gangrene    Incisional hernia    Skin lesion of left ear    Enteritis    Small bowel obstruction (HCC)      Past Medical History:   Diagnosis Date    CAD (coronary artery disease)     VCS - Dr. Nye; 6 cardiac stents    Hyperlipidemia     Prostate cancer (HCC)     being monitored    Small bowel obstruction (HCC) 11/2024    hospitalized at [facility]- after hernia repair surgery      Not on File   Family History   Problem Relation Age of Onset    Lung Disease Mother     Heart Disease Father     Colon Cancer Father     Prostate Cancer Father     Heart Disease Brother       Social History     Socioeconomic History    Marital status:      Spouse name: Not on file    Number of children: Not on file    Years of education: Not on file    Highest education level: Not on file   Occupational History    Not on file   Tobacco Use    Smoking status: Never     Passive exposure: Never    Smokeless tobacco:

## 2025-03-18 SDOH — ECONOMIC STABILITY: FOOD INSECURITY: WITHIN THE PAST 12 MONTHS, YOU WORRIED THAT YOUR FOOD WOULD RUN OUT BEFORE YOU GOT MONEY TO BUY MORE.: NEVER TRUE

## 2025-03-18 SDOH — ECONOMIC STABILITY: FOOD INSECURITY: WITHIN THE PAST 12 MONTHS, THE FOOD YOU BOUGHT JUST DIDN'T LAST AND YOU DIDN'T HAVE MONEY TO GET MORE.: NEVER TRUE

## 2025-03-18 SDOH — ECONOMIC STABILITY: TRANSPORTATION INSECURITY
IN THE PAST 12 MONTHS, HAS THE LACK OF TRANSPORTATION KEPT YOU FROM MEDICAL APPOINTMENTS OR FROM GETTING MEDICATIONS?: NO

## 2025-03-18 SDOH — ECONOMIC STABILITY: INCOME INSECURITY: IN THE LAST 12 MONTHS, WAS THERE A TIME WHEN YOU WERE NOT ABLE TO PAY THE MORTGAGE OR RENT ON TIME?: NO

## 2025-03-21 ENCOUNTER — OFFICE VISIT (OUTPATIENT)
Facility: CLINIC | Age: 70
End: 2025-03-21

## 2025-03-21 VITALS
WEIGHT: 145.1 LBS | BODY MASS INDEX: 22.78 KG/M2 | OXYGEN SATURATION: 96 % | TEMPERATURE: 98.1 F | DIASTOLIC BLOOD PRESSURE: 63 MMHG | HEART RATE: 67 BPM | RESPIRATION RATE: 16 BRPM | SYSTOLIC BLOOD PRESSURE: 105 MMHG | HEIGHT: 67 IN

## 2025-03-21 DIAGNOSIS — R41.3 MEMORY CHANGES: ICD-10-CM

## 2025-03-21 DIAGNOSIS — C61 PROSTATE CA (HCC): ICD-10-CM

## 2025-03-21 DIAGNOSIS — E74.39 GLUCOSE INTOLERANCE: ICD-10-CM

## 2025-03-21 DIAGNOSIS — I25.10 ASCVD (ARTERIOSCLEROTIC CARDIOVASCULAR DISEASE): ICD-10-CM

## 2025-03-21 DIAGNOSIS — F32.A DEPRESSION, UNSPECIFIED DEPRESSION TYPE: ICD-10-CM

## 2025-03-21 DIAGNOSIS — E78.2 MIXED HYPERLIPIDEMIA: Primary | ICD-10-CM

## 2025-03-21 LAB
ALBUMIN SERPL-MCNC: 4 G/DL (ref 3.5–5)
ALBUMIN/GLOB SERPL: 1.4 (ref 1.1–2.2)
ALP SERPL-CCNC: 67 U/L (ref 45–117)
ALT SERPL-CCNC: 41 U/L (ref 12–78)
ANION GAP SERPL CALC-SCNC: 4 MMOL/L (ref 2–12)
AST SERPL-CCNC: 31 U/L (ref 15–37)
BILIRUB SERPL-MCNC: 1 MG/DL (ref 0.2–1)
BUN SERPL-MCNC: 23 MG/DL (ref 6–20)
BUN/CREAT SERPL: 24 (ref 12–20)
CALCIUM SERPL-MCNC: 9.6 MG/DL (ref 8.5–10.1)
CHLORIDE SERPL-SCNC: 106 MMOL/L (ref 97–108)
CHOLEST SERPL-MCNC: 146 MG/DL
CK SERPL-CCNC: 139 U/L (ref 39–308)
CO2 SERPL-SCNC: 28 MMOL/L (ref 21–32)
CREAT SERPL-MCNC: 0.97 MG/DL (ref 0.7–1.3)
EST. AVERAGE GLUCOSE BLD GHB EST-MCNC: 120 MG/DL
GLOBULIN SER CALC-MCNC: 2.9 G/DL (ref 2–4)
GLUCOSE SERPL-MCNC: 69 MG/DL (ref 65–100)
HBA1C MFR BLD: 5.8 % (ref 4–5.6)
HDLC SERPL-MCNC: 74 MG/DL
HDLC SERPL: 2 (ref 0–5)
LDLC SERPL CALC-MCNC: 55 MG/DL (ref 0–100)
POTASSIUM SERPL-SCNC: 4.8 MMOL/L (ref 3.5–5.1)
PROT SERPL-MCNC: 6.9 G/DL (ref 6.4–8.2)
PSA SERPL-MCNC: 11.8 NG/ML (ref 0.01–4)
SODIUM SERPL-SCNC: 138 MMOL/L (ref 136–145)
TRIGL SERPL-MCNC: 85 MG/DL
VLDLC SERPL CALC-MCNC: 17 MG/DL

## 2025-03-21 RX ORDER — ESCITALOPRAM OXALATE 10 MG/1
10 TABLET ORAL DAILY
Qty: 30 TABLET | Refills: 3 | Status: SHIPPED | OUTPATIENT
Start: 2025-03-21

## 2025-03-21 NOTE — PROGRESS NOTES
Chief Complaint   Patient presents with    6 Month Follow-Up    Blood Work     \"Have you been to the ER, urgent care clinic since your last visit?  Hospitalized since your last visit?\"    NO    “Have you seen or consulted any other health care providers outside of [facility] since your last visit?”    Yes, this morning with Pulmonary.          
pain, blood in stools, heartburn or nausea/vomiting  Genito-Urinary: no dysuria, trouble voiding, or hematuria  Musculoskeletal: negative for - gait disturbance, joint pain, joint stiffness or joint swelling  Neurological: no TIA or stroke symptoms  Hematologic: no bruises, no bleeding  Lymphatic: no swollen glands  Integument: no lumps, mole changes, nail changes or rash  Endocrine:no malaise/lethargy poly uria or polydipsia or unexpected weight changes        Social History     Socioeconomic History    Marital status:      Spouse name: None    Number of children: None    Years of education: None    Highest education level: None   Tobacco Use    Smoking status: Never     Passive exposure: Never    Smokeless tobacco: Never   Vaping Use    Vaping status: Never Used   Substance and Sexual Activity    Alcohol use: Yes     Comment: occasionally    Drug use: No    Sexual activity: Defer     Social Drivers of Health     Food Insecurity: No Food Insecurity (3/18/2025)    Hunger Vital Sign     Worried About Running Out of Food in the Last Year: Never true     Ran Out of Food in the Last Year: Never true   Transportation Needs: No Transportation Needs (3/18/2025)    PRAPARE - Transportation     Lack of Transportation (Medical): No     Lack of Transportation (Non-Medical): No   Physical Activity: Sufficiently Active (9/11/2024)    Exercise Vital Sign     Days of Exercise per Week: 6 days     Minutes of Exercise per Session: 60 min   Housing Stability: Low Risk  (3/18/2025)    Housing Stability Vital Sign     Unable to Pay for Housing in the Last Year: No     Number of Times Moved in the Last Year: 0     Homeless in the Last Year: No     Family History   Problem Relation Age of Onset    Lung Disease Mother     Heart Disease Father     Colon Cancer Father     Prostate Cancer Father     Heart Disease Brother        OBJECTIVE:     /63 (BP Site: Left Upper Arm, Patient Position: Sitting, BP Cuff Size: Medium Adult)

## 2025-03-30 ENCOUNTER — RESULTS FOLLOW-UP (OUTPATIENT)
Facility: CLINIC | Age: 70
End: 2025-03-30

## 2025-04-10 ENCOUNTER — TELEPHONE (OUTPATIENT)
Age: 70
End: 2025-04-10

## 2025-04-10 NOTE — TELEPHONE ENCOUNTER
Referral received from PCP to be seen for Memory Changes. Patient will call back to schedule a Memory eval and braincheck with Dr. Wolfe once he looks at his calendar.

## 2025-04-11 DIAGNOSIS — I25.10 ASCVD (ARTERIOSCLEROTIC CARDIOVASCULAR DISEASE): ICD-10-CM

## 2025-04-11 RX ORDER — EVOLOCUMAB 140 MG/ML
INJECTION, SOLUTION SUBCUTANEOUS
Qty: 6 ADJUSTABLE DOSE PRE-FILLED PEN SYRINGE | Refills: 3 | Status: SHIPPED | OUTPATIENT
Start: 2025-04-11

## 2025-04-11 NOTE — TELEPHONE ENCOUNTER
RX refill request from the patient/pharmacy. Patient last seen 03- with labs, and next appt. scheduled for 09-  Requested Prescriptions     Pending Prescriptions Disp Refills    REPATHA SURECLICK 140 MG/ML SOAJ [Pharmacy Med Name: REPATHA 140 MG/ML SURECLICK] 6 Adjustable Dose Pre-filled Pen Syringe 3     Sig: INJECT 140 MG INTO THE SKIN EVERY 14 DAYS    .

## 2025-04-14 RX ORDER — ESCITALOPRAM OXALATE 10 MG/1
10 TABLET ORAL DAILY
Qty: 90 TABLET | Refills: 1 | Status: SHIPPED | OUTPATIENT
Start: 2025-04-14

## 2025-04-14 NOTE — TELEPHONE ENCOUNTER
RX refill request from the patient/pharmacy. Patient last seen 03/21/2025 with labs, and next appt. scheduled for 09/22/2025.   Requested Prescriptions     Pending Prescriptions Disp Refills    escitalopram (LEXAPRO) 10 MG tablet [Pharmacy Med Name: ESCITALOPRAM 10 MG TABLET] 90 tablet 1     Sig: TAKE 1 TABLET BY MOUTH EVERY DAY

## 2025-06-06 ENCOUNTER — OFFICE VISIT (OUTPATIENT)
Age: 70
End: 2025-06-06
Payer: MEDICARE

## 2025-06-06 VITALS
OXYGEN SATURATION: 97 % | SYSTOLIC BLOOD PRESSURE: 123 MMHG | BODY MASS INDEX: 23.02 KG/M2 | RESPIRATION RATE: 16 BRPM | HEART RATE: 68 BPM | WEIGHT: 147 LBS | DIASTOLIC BLOOD PRESSURE: 88 MMHG

## 2025-06-06 DIAGNOSIS — R41.3 MEMORY CHANGE: Primary | ICD-10-CM

## 2025-06-06 DIAGNOSIS — R41.3 MEMORY DIFFICULTIES: ICD-10-CM

## 2025-06-06 PROCEDURE — 99204 OFFICE O/P NEW MOD 45 MIN: CPT | Performed by: PSYCHIATRY & NEUROLOGY

## 2025-06-06 PROCEDURE — 95816 EEG AWAKE AND DROWSY: CPT | Performed by: PSYCHIATRY & NEUROLOGY

## 2025-06-06 PROCEDURE — 96132 NRPSYC TST EVAL PHYS/QHP 1ST: CPT | Performed by: PSYCHIATRY & NEUROLOGY

## 2025-06-06 PROCEDURE — 96138 PSYCL/NRPSYC TECH 1ST: CPT | Performed by: PSYCHIATRY & NEUROLOGY

## 2025-06-06 NOTE — PROGRESS NOTES
[facility] at [facility] at [address] [place] [address] [place] [id number] [facility] [address] [place] [address] [phone] Office  [phone] Facsimile           Referring: Dr Luis Rai  Primary: Leighton Dillon MD  [address] [place] [address]     Chief Complaint   Patient presents with    Dementia     Patient stated that his wife said that his memory is getting worse but he doesn't think anything is wrong with him    New Patient     History of Present Illness    70-year-old gentleman presenting today for initial neurologic consultation regarding memory difficulty.    His wife has expressed concerns regarding his memory, noting instances where he fails to recall recent discussions or events. He acknowledges occasional forgetfulness but does not perceive it to be as severe as his wife suggests.  He has not undergone any brain imaging in the past. He was referred to our clinic by Dr. Markus Rai, with whom he and his wife were undergoing marital therapy, and his wife's concern regarding his memory emerged during their sessions.  He has a history of concussion from childhood. He has a history of heart disease and has had stents placed. He has very minor prostate cancer with an elevated PSA. A biopsy and MRI were performed, with the MRI results being indeterminate. He experienced pneumonia and bronchitis in 01/2025, which lingered for months.    FAMILY HISTORY  - Negative for dementia in family history.    Record review  Emergency department visit January 25, 2025 [facility] where patient presented with shortness of breath.  He was diagnosed with pneumonia and given Augmentin, Ventolin and Robitussin.    Laboratory analysis  March 21, 2025  Hemoglobin A1c 5.8  CK1 39  Lipid panel with an LDL of 55  Comprehensive metabolic panel unremarkable  PSA 11.8    September

## 2025-06-06 NOTE — PROCEDURES
Cognitive Testing Evaluation Summary    Cut and paste the following into the patient's encounter notes in your EHR.    Patient Information:    KHADRA JOHNSON  1955  Male  This 70 year old male was administered a battery of neurocognitive testing on 06/06/2025.    Reason for Testing:  Memory change    Tests Administered:  Trails A, Trails B, Stroop, Digit Symbol Substitution, Immediate Recognition, Delayed Recognition    Test Time:  The time spent administering cognitive testing was 17 minutes including setting the patient up,  creating the order, discussing cognitive testing, answering questions, administering the test / active  testing time, ensuring best practices (i.e. no distractions), and uploading the results. This cognitive  testing was administered by a technician.    Test Results:  Cognitive testing was provided via a battery of cognitive assessments. The pattern of test scores indicate that results are valid.    A Clinical Report with further description of scores and results is also available.    Overall: Patient tested in the 14th percentile (scaled standard score of 84).  Trails A: Patient tested in the 41st percentile (scaled standard score of 97).  Trails B: Patient tested in the 58th percentile (scaled standard score of 103).  Stroop: Patient tested in the 52nd percentile (scaled standard score of 101).  Digit Symbol Substitution: Patient tested in the 37th percentile (scaled standard score of 95).  Immediate Recognition: Patient tested in the 27th percentile (scaled standard score of 91).  Delayed Recognition: Patient tested in the 1st percentile (scaled standard score of 50).    Interpretation of Test Scores:  Examination of individual component tests shows:  Attention - Trails A: Unlikely Impairment  Mental Flexibility - Trails B: Unlikely Impairment  Executive Function - Stroop: Unlikely Impairment  Processing Speed - Digit Symbol Substitution: Unlikely Impairment  Memory - Immediate

## 2025-06-10 ENCOUNTER — PROCEDURE VISIT (OUTPATIENT)
Age: 70
End: 2025-06-10
Payer: MEDICARE

## 2025-06-10 DIAGNOSIS — R41.0 CONFUSION: Primary | ICD-10-CM

## 2025-06-10 PROCEDURE — 95819 EEG AWAKE AND ASLEEP: CPT | Performed by: PSYCHIATRY & NEUROLOGY

## 2025-06-16 NOTE — PROCEDURES
Procedures        [facility]   Electroencephalogram Report    Procedure ID: [id number] Procedure Date: 6/10/2025   Patient Name: Trenton Maurice YOB: 1955   Procedure Type: Routine Medical Record No: [id number]     An EEG is requested in this 70-year-old man to evaluate for epileptiform abnormality.  Medications listed as aspirin, Crestor, Cialis    This tracing is obtained during the awake, drowsy, and sleeping states.    During wakefulness, there are intermittent runs of posteriorly dominant and symmetric low to medium amplitude 10-11 cps activities which attenuate with eye opening.  Lower voltage faster frequency activities are seen symmetrically over the anterior head regions.    Hyperventilation is not performed.    Intermittent photic stimulation little alters the tracing    Stage II sleep is obtained.    Interpretation  This EEG recorded during the awake, drowsy, and sleeping states is normal.          Vanita Wolfe MD

## 2025-06-23 NOTE — PROGRESS NOTES
Intake Note      Patient Name: Trenton Maurice  YOB: 1955    Age: 70 y.o.  Date of Intake: 6/24/2025   Education: MD Ethnicity White   Gender: Male Referring Provider: Dr. Wolfe     REASON FOR REFERRAL AND EVALUATION PROCEDURES:  Tretnon Maurice  was referred for evaluation by his Neurologist to assist in differential diagnosis and individualized treatment planning. he understood the rationale and procedures for evaluation, as well as the limits to confidentiality, and agreed to participate. he consented to have this report made available to his  treating providers through his  electronic medical records.   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  He presented independently for clinical interview and appeared to be an adequate historian.  Per the patient's report, he has always experienced a relative weaknesses in recalling people's names.  He has not personally noticed other clinically significant changes in his cognitive functioning but he acknowledged his wife has expressed concerns.  He stated his wife claims he does not recall details from both recent events/conversations as well as events/conversations that took place a month or more ago.  He recalled completing the brain check testing with Dr. Wolfe and remembered there were aspects he struggled with.  Reviewing that note, it looks like delayed recognition was an area of weakness for him.    Pertaining to the patient's emotional functioning, he described his recent mood to be \"flat.\"  He stated he has felt more emotionally blunted over the past couple years.  He and his wife have been intermittently experiencing relationship difficulties and he has participated in a combination of individual and couples

## 2025-06-24 ENCOUNTER — OFFICE VISIT (OUTPATIENT)
Age: 70
End: 2025-06-24
Payer: MEDICARE

## 2025-06-24 DIAGNOSIS — R41.3 MEMORY DIFFICULTIES: Primary | ICD-10-CM

## 2025-06-24 DIAGNOSIS — F33.0 MILD EPISODE OF RECURRENT MAJOR DEPRESSIVE DISORDER: ICD-10-CM

## 2025-06-24 PROCEDURE — 90791 PSYCH DIAGNOSTIC EVALUATION: CPT | Performed by: CLINICAL NEUROPSYCHOLOGIST

## 2025-06-25 ENCOUNTER — PROCEDURE VISIT (OUTPATIENT)
Age: 70
End: 2025-06-25
Payer: MEDICARE

## 2025-06-25 DIAGNOSIS — G31.84 MILD COGNITIVE IMPAIRMENT: Primary | ICD-10-CM

## 2025-06-25 PROCEDURE — 96132 NRPSYC TST EVAL PHYS/QHP 1ST: CPT | Performed by: CLINICAL NEUROPSYCHOLOGIST

## 2025-06-25 PROCEDURE — 96133 NRPSYC TST EVAL PHYS/QHP EA: CPT | Performed by: CLINICAL NEUROPSYCHOLOGIST

## 2025-06-25 PROCEDURE — 96139 PSYCL/NRPSYC TST TECH EA: CPT | Performed by: CLINICAL NEUROPSYCHOLOGIST

## 2025-06-25 PROCEDURE — 96138 PSYCL/NRPSYC TECH 1ST: CPT | Performed by: CLINICAL NEUROPSYCHOLOGIST

## 2025-06-26 ENCOUNTER — HOSPITAL ENCOUNTER (OUTPATIENT)
Facility: HOSPITAL | Age: 70
Discharge: HOME | End: 2025-06-29
Attending: PSYCHIATRY & NEUROLOGY
Payer: MEDICARE

## 2025-06-26 DIAGNOSIS — R41.3 MEMORY DIFFICULTIES: ICD-10-CM

## 2025-06-26 PROCEDURE — 70551 MRI BRAIN STEM W/O DYE: CPT

## 2025-06-27 ENCOUNTER — TELEPHONE (OUTPATIENT)
Age: 70
End: 2025-06-27

## 2025-06-27 DIAGNOSIS — R41.3 MEMORY CHANGE: ICD-10-CM

## 2025-06-27 DIAGNOSIS — G31.84 MILD COGNITIVE IMPAIRMENT: Primary | ICD-10-CM

## 2025-06-27 NOTE — PROGRESS NOTES
Neuropsychological Evaluation Report      Patient Name: Trenton Maurice  YOB: 1955    Age: 70 y.o.  Date of Intake: 2025   Education: MD Date of Testin2025   Gender: Male Ethnicity: White     Referring Provider: Dr. Wolfe     REASON FOR REFERRAL AND EVALUATION PROCEDURES:  Trenton Maurice  was referred for neuropsychological evaluation by his neurologist to obtain a quantitative assessment of his current level of neurocognitive functioning, assist in differential diagnosis, and aid in individualized treatment planning. The patient understood the rationale and procedures for evaluation, as well as the limits to confidentiality, and they agreed to participate. The patient consented to have this report made available to his  treating providers through his  electronic medical records. This evaluation was completed with the patient by Wilbert Ferrera PsyD with the exception of testing by technician, which was completed by BRITTA Pacheco under the supervision of Dr. Ferrera.  History Sources: Patient, Medical Record, and Test Data    SUMMARY AND IMPRESSION:  The following section is a summary of the patient's pertinent test results and impressions. A more thorough review of the patient's background and test scores can be found below.    Relative to the patient's estimated high average to above average baseline, testing revealed a significant weakness on list recall (three standard deviations below baseline).  Other learning and recall measures were within normal limits and recognition/discrimination for all memory tests were within normal limits.  Relative weakness was also demonstrated on language-based tests, particularly semantic verbal fluency (two standard deviations below baseline).  Mental processing speed was also an area of relative weakness (two standard deviations below baseline).  Attention/working memory, executive functioning, and visuospatial functioning were within

## 2025-06-30 ENCOUNTER — PATIENT MESSAGE (OUTPATIENT)
Age: 70
End: 2025-06-30

## 2025-08-06 ENCOUNTER — HOSPITAL ENCOUNTER (OUTPATIENT)
Facility: HOSPITAL | Age: 70
Discharge: HOME | End: 2025-08-09
Attending: PSYCHIATRY & NEUROLOGY
Payer: MEDICARE

## 2025-08-06 DIAGNOSIS — R41.3 MEMORY CHANGE: ICD-10-CM

## 2025-08-06 DIAGNOSIS — G31.84 MILD COGNITIVE IMPAIRMENT: ICD-10-CM

## 2025-08-06 PROCEDURE — 6360000002 HC RX W HCPCS: Performed by: PSYCHIATRY & NEUROLOGY

## 2025-08-06 PROCEDURE — 78814 PET IMAGE W/CT LMTD: CPT

## 2025-08-06 PROCEDURE — A9586 FLORBETAPIR F18: HCPCS | Performed by: PSYCHIATRY & NEUROLOGY

## 2025-08-06 RX ADMIN — FLORBETAPIR F 18 10 MILLICURIE: 51 INJECTION, SOLUTION INTRAVENOUS at 13:40

## 2025-08-07 LAB
APOE GENE MUT ANL BLD/T: NORMAL
CITATION REF LAB TEST: NORMAL
LABORATORY COMMENT REPORT: NORMAL
PROVIDER SIGNING NAME: NORMAL
REF LAB TEST METHOD: NORMAL
TEST PERFORMANCE INFO SPEC: NORMAL
TEST PERFORMANCE INFO SPEC: NORMAL

## 2025-08-08 ENCOUNTER — TELEPHONE (OUTPATIENT)
Age: 70
End: 2025-08-08

## 2025-08-08 ENCOUNTER — PATIENT MESSAGE (OUTPATIENT)
Age: 70
End: 2025-08-08

## 2025-08-20 ENCOUNTER — OFFICE VISIT (OUTPATIENT)
Age: 70
End: 2025-08-20
Payer: MEDICARE

## 2025-08-20 VITALS
RESPIRATION RATE: 16 BRPM | OXYGEN SATURATION: 97 % | HEART RATE: 63 BPM | SYSTOLIC BLOOD PRESSURE: 131 MMHG | DIASTOLIC BLOOD PRESSURE: 75 MMHG

## 2025-08-20 DIAGNOSIS — G30.9 MILD COGNITIVE IMPAIRMENT (MCI) DUE TO ALZHEIMER'S DISEASE (HCC): Primary | ICD-10-CM

## 2025-08-20 DIAGNOSIS — G31.84 MILD COGNITIVE IMPAIRMENT (MCI) DUE TO ALZHEIMER'S DISEASE (HCC): Primary | ICD-10-CM

## 2025-08-20 PROCEDURE — 1123F ACP DISCUSS/DSCN MKR DOCD: CPT | Performed by: PSYCHIATRY & NEUROLOGY

## 2025-08-20 PROCEDURE — 1159F MED LIST DOCD IN RCRD: CPT | Performed by: PSYCHIATRY & NEUROLOGY

## 2025-08-20 PROCEDURE — 99214 OFFICE O/P EST MOD 30 MIN: CPT | Performed by: PSYCHIATRY & NEUROLOGY

## 2025-08-20 PROCEDURE — 3017F COLORECTAL CA SCREEN DOC REV: CPT | Performed by: PSYCHIATRY & NEUROLOGY

## 2025-08-20 PROCEDURE — 1036F TOBACCO NON-USER: CPT | Performed by: PSYCHIATRY & NEUROLOGY

## 2025-08-20 PROCEDURE — G8420 CALC BMI NORM PARAMETERS: HCPCS | Performed by: PSYCHIATRY & NEUROLOGY

## 2025-08-20 PROCEDURE — G8427 DOCREV CUR MEDS BY ELIG CLIN: HCPCS | Performed by: PSYCHIATRY & NEUROLOGY

## (undated) DEVICE — WATERPROOF, BACTERIA PROOF DRESSING WITH ABSORBENT SEE THROUGH PAD: Brand: OPSITE POST-OP VISIBLE 15X10CM CTN 20

## (undated) DEVICE — SNARE ENDOSCP POLYP MED STD AD 2.4X27X240 CM 2.8 MM OVL SENS

## (undated) DEVICE — 3000CC GUARDIAN II: Brand: GUARDIAN

## (undated) DEVICE — ARM DRAPE

## (undated) DEVICE — LAMINECTOMY RICHMOND-LF: Brand: MEDLINE INDUSTRIES, INC.

## (undated) DEVICE — GLOVE ORANGE PI 7 1/2   MSG9075

## (undated) DEVICE — NEEDLE HYPO 18GA L1.5IN PNK S STL HUB POLYPR SHLD REG BVL

## (undated) DEVICE — CATHETER IV 20GA L1.25IN FEP STR HUB TEF INTROCAN SFTY

## (undated) DEVICE — DRAPE XR C ARM 41X74IN LF --

## (undated) DEVICE — TR BAND RADIAL ARTERY COMPRESSION DEVICE: Brand: TR BAND

## (undated) DEVICE — SUTURE PDS II SZ 2-0 L27IN ABSRB VLT SH L26MM 1/2 CIR Z317H

## (undated) DEVICE — INFECTION CONTROL KIT SYS

## (undated) DEVICE — TUBING PRSS MON L6IN PVC M FEM CONN

## (undated) DEVICE — GLIDESHEATH SLENDER ACCESS KIT: Brand: GLIDESHEATH SLENDER

## (undated) DEVICE — FLOSEAL MATRIX IS INDICATED IN SURGICAL PROCEDURES (OTHER THAN IN OPHTHALMIC) AS AN ADJUNCT TO HEMOSTASIS WHEN CONTROL OF BLEEDING BY LIGATURE OR CONVENTIONALPROCEDURES IS INEFFECTIVE OR IMPRACTICAL.: Brand: FLOSEAL HEMOSTATIC MATRIX

## (undated) DEVICE — SUT PROL 6-0 18IN BV1 DA BLU --

## (undated) DEVICE — SUTURE VCRL SZ 3-0 L18IN ABSRB UD CP-2 L26MM 1/2 CIR REV J761D

## (undated) DEVICE — 3M™ TEGADERM™ TRANSPARENT FILM DRESSING FRAME STYLE, 1626W, 4 IN X 4-3/4 IN (10 CM X 12 CM), 50/CT 4CT/CASE: Brand: 3M™ TEGADERM™

## (undated) DEVICE — CATH GUID COR AL75 6FR 100CM -- LAUNCHER

## (undated) DEVICE — COVER,MAYO STAND,STERILE: Brand: MEDLINE

## (undated) DEVICE — INJECTION THERAPY NEEDLE CATHETER: Brand: INTERJECT

## (undated) DEVICE — CATH ANGI BLLN DIL 2.25X15MM -- NC EUPHORA

## (undated) DEVICE — SUTURE MCRYL SZ 4-0 L27IN ABSRB UD L19MM PS-2 1/2 CIR PRIM Y426H

## (undated) DEVICE — STERILE POLYISOPRENE POWDER-FREE SURGICAL GLOVES: Brand: PROTEXIS

## (undated) DEVICE — SUTURE VCRL SZ 0 L45CM ABSRB VLT OS-6 L36.4MM 1/2 CIR REV J711T

## (undated) DEVICE — SKIN MARKER,REGULAR TIP WITH RULER AND LABELS: Brand: DEVON

## (undated) DEVICE — TISSUE RETRIEVAL SYSTEM: Brand: INZII RETRIEVAL SYSTEM

## (undated) DEVICE — SEAL

## (undated) DEVICE — SOLUTION ANTIFOG VIS SYS CLEARIFY LAPSCP

## (undated) DEVICE — SET GRAV CK VLV NEEDLESS ST 3 GANGED 4WAY STPCOCK HI FLO 10

## (undated) DEVICE — PILLOW POS AD L7IN R FOAM HD REST INTUB SLOT DISP

## (undated) DEVICE — DRAPE PRB US TRNSDCR 6X96IN --

## (undated) DEVICE — PACK PROCEDURE SURG HRT CATH

## (undated) DEVICE — CATHETER PTCA EUPHORA L142CM BLLN L15MM DIA2MM 0.022IN 14ATM

## (undated) DEVICE — FIAPC® PROBE W/ FILTER 2200 A OD 2.3MM/6.9FR; L 2.2M/7.2FT: Brand: ERBE

## (undated) DEVICE — SYRINGE ANGIO 10 CC BRL STD PRNT POLYCARB LT BLU MEDALLION

## (undated) DEVICE — KIT ANGIOGRAPHY CUST MRMC

## (undated) DEVICE — SOLUTION IRRIG 1000ML H2O STRL BLT

## (undated) DEVICE — BLADELESS OBTURATOR: Brand: WECK VISTA

## (undated) DEVICE — HANDLE LT SNAP ON ULT DURABLE LENS FOR TRUMPF ALC DISPOSABLE

## (undated) DEVICE — MEDI-VAC NON-CONDUCTIVE SUCTION TUBING: Brand: CARDINAL HEALTH

## (undated) DEVICE — HYPODERMIC SAFETY NEEDLE: Brand: MONOJECT

## (undated) DEVICE — IV START KIT: Brand: MEDLINE

## (undated) DEVICE — CUFF BLD PRSS AD CLTH SGL TB W/ BAYNT CONN ROUNDED CORNER

## (undated) DEVICE — ELECTRODE PT RET AD L9FT HI MOIST COND ADH HYDRGEL CORDED

## (undated) DEVICE — COLUMN DRAPE

## (undated) DEVICE — SOLUTION IV 1000ML 0.9% SOD CHL

## (undated) DEVICE — TOOL 14BA50 LEGEND 14CM 5MM BA: Brand: MIDAS REX ™

## (undated) DEVICE — TRAY CATH 16F DRN BG LTX -- CONVERT TO ITEM 363158

## (undated) DEVICE — CORD ELECSURG BPLR 12 FT DISP [810T818750] [ADLER INSTRUMENT CO]

## (undated) DEVICE — FIAPC® PROBE W/ FILTER 2200 C OD 2.3MM/6.9FR; L 2.2M/7.2FT: Brand: ERBE

## (undated) DEVICE — KENDALL SCD EXPRESS SLEEVES, KNEE LENGTH, MEDIUM: Brand: KENDALL SCD

## (undated) DEVICE — KIT ACCS INTRO 4FR L10CM NDL 21GA L7CM GWIRE L40CM

## (undated) DEVICE — SINGLE-USE POLYPECTOMY SNARE: Brand: CAPTIVATOR

## (undated) DEVICE — SUTURE PROL SZ 2-0 L30IN NONABSORBABLE BLU L26MM CT-1 1/2 8423H

## (undated) DEVICE — BONE WAX WHITE: Brand: BONE WAX WHITE

## (undated) DEVICE — TRAY PREP DRY W/ PREM GLV 2 APPL 6 SPNG 2 UNDPD 1 OVERWRAP

## (undated) DEVICE — Device: Brand: PROWATER

## (undated) DEVICE — DRAPE FLD WRM W44XL66IN C6L FOR INTRATEMP SYS THERMABASIN

## (undated) DEVICE — CATHETER ANGIO JL3.5 AD 5 FRX100 CM PERFORMA

## (undated) DEVICE — SUTURE VICRYL + SZ 4-0 L27IN ABSRB UD PS-2 3/8 CIR REV CUT VCP426H

## (undated) DEVICE — GUIDEWIRE VASC L260CM 0.035IN J TIP L3MM PTFE FIX COR NAMIC

## (undated) DEVICE — SYR POWER 150ML 8IN FILL TUBE --

## (undated) DEVICE — TIP SUCT TRNSPAR RIB SURF STD BLB RIG NVENT W/ 5IN1 CONN DYND50138] MEDLINE INDUSTRIES INC]

## (undated) DEVICE — CATHETER ANGIO JR4 AD 5 FRX100 CM 25 CM PERFORMA

## (undated) DEVICE — NEEDLE HYPO 25GA L1.5IN BVL ORIENTED ECLIPSE

## (undated) DEVICE — CATHETER ETER ANGIO L110CM OD5FR ID046IN L75CM 038IN 145DEG CARD

## (undated) DEVICE — DEVICE TRNSF SPIK STL 2008S] MICROTEK MEDICAL INC]

## (undated) DEVICE — INTENDED FOR TISSUE SEPARATION, AND OTHER PROCEDURES THAT REQUIRE A SHARP SURGICAL BLADE TO PUNCTURE OR CUT.: Brand: BARD-PARKER ® CARBON RIB-BACK BLADES

## (undated) DEVICE — LIQUIBAND RAPID ADHESIVE 36/CS 0.8ML: Brand: MEDLINE

## (undated) DEVICE — SPLINT WR POS F/ARTERIAL ACC -- BX/10

## (undated) DEVICE — NEEDLE SPNL 20GA L3.5IN YEL HUB S STL REG WALL FIT STYL W/

## (undated) DEVICE — TIP COVER ACCESSORY

## (undated) DEVICE — ENDOSCOPIC KIT COMPLIANCE ENDOKIT

## (undated) DEVICE — GENERAL LAPAROSCOPY-MRMC: Brand: MEDLINE INDUSTRIES, INC.

## (undated) DEVICE — SUTURE V-LOC 180 SZ 2-0 L9IN ABSRB VLT GS-21 L37MM 1/2 CIR VLOCM0345

## (undated) DEVICE — SYRINGE MED 10ML LUERLOCK TIP W/O SFTY DISP

## (undated) DEVICE — SUTURE DEV SZ 0 L6IN N ABSORBABLE

## (undated) DEVICE — HI-TORQUE VERSACORE FLOPPY GUIDE WIRE SYSTEM 145 CM: Brand: HI-TORQUE VERSACORE

## (undated) DEVICE — TOOL 14MH30 LEGEND 14CM 3MM: Brand: MIDAS REX ™

## (undated) DEVICE — SYRINGE MED 20ML STD CLR PLAS LUERLOCK TIP N CTRL DISP

## (undated) DEVICE — CUSTOM KT PTCA INFL DEV K05 00053H